# Patient Record
Sex: FEMALE | Race: WHITE | NOT HISPANIC OR LATINO | Employment: FULL TIME | URBAN - METROPOLITAN AREA
[De-identification: names, ages, dates, MRNs, and addresses within clinical notes are randomized per-mention and may not be internally consistent; named-entity substitution may affect disease eponyms.]

---

## 2017-03-15 ENCOUNTER — OFFICE VISIT - HEALTHEAST (OUTPATIENT)
Dept: FAMILY MEDICINE | Facility: CLINIC | Age: 32
End: 2017-03-15

## 2017-03-15 DIAGNOSIS — N39.0 UTI (URINARY TRACT INFECTION): ICD-10-CM

## 2017-10-30 ENCOUNTER — AMBULATORY - HEALTHEAST (OUTPATIENT)
Dept: NURSING | Facility: CLINIC | Age: 32
End: 2017-10-30

## 2017-10-30 DIAGNOSIS — Z23 NEEDS FLU SHOT: ICD-10-CM

## 2018-02-22 ENCOUNTER — OFFICE VISIT - HEALTHEAST (OUTPATIENT)
Dept: FAMILY MEDICINE | Facility: CLINIC | Age: 33
End: 2018-02-22

## 2018-02-22 DIAGNOSIS — H66.91 RIGHT OTITIS MEDIA: ICD-10-CM

## 2018-04-06 ENCOUNTER — RECORDS - HEALTHEAST (OUTPATIENT)
Dept: ADMINISTRATIVE | Facility: OTHER | Age: 33
End: 2018-04-06

## 2018-04-10 ENCOUNTER — RECORDS - HEALTHEAST (OUTPATIENT)
Dept: ADMINISTRATIVE | Facility: OTHER | Age: 33
End: 2018-04-10

## 2018-04-11 ENCOUNTER — COMMUNICATION - HEALTHEAST (OUTPATIENT)
Dept: ADMINISTRATIVE | Facility: CLINIC | Age: 33
End: 2018-04-11

## 2018-04-18 ENCOUNTER — OFFICE VISIT - HEALTHEAST (OUTPATIENT)
Dept: EDUCATION SERVICES | Facility: CLINIC | Age: 33
End: 2018-04-18

## 2018-04-18 DIAGNOSIS — O24.410 DIET CONTROLLED GESTATIONAL DIABETES MELLITUS (GDM) IN SECOND TRIMESTER: ICD-10-CM

## 2018-04-24 ENCOUNTER — COMMUNICATION - HEALTHEAST (OUTPATIENT)
Dept: ADMINISTRATIVE | Facility: CLINIC | Age: 33
End: 2018-04-24

## 2018-04-25 ENCOUNTER — AMBULATORY - HEALTHEAST (OUTPATIENT)
Dept: EDUCATION SERVICES | Facility: CLINIC | Age: 33
End: 2018-04-25

## 2018-04-25 DIAGNOSIS — O24.414 INSULIN CONTROLLED GESTATIONAL DIABETES MELLITUS (GDM) IN SECOND TRIMESTER: ICD-10-CM

## 2018-04-26 ENCOUNTER — COMMUNICATION - HEALTHEAST (OUTPATIENT)
Dept: ENDOCRINOLOGY | Facility: CLINIC | Age: 33
End: 2018-04-26

## 2018-04-26 DIAGNOSIS — O24.414 INSULIN CONTROLLED GESTATIONAL DIABETES MELLITUS (GDM) IN THIRD TRIMESTER: ICD-10-CM

## 2018-05-03 ENCOUNTER — OFFICE VISIT - HEALTHEAST (OUTPATIENT)
Dept: EDUCATION SERVICES | Facility: CLINIC | Age: 33
End: 2018-05-03

## 2018-05-03 DIAGNOSIS — O24.414 INSULIN CONTROLLED GESTATIONAL DIABETES MELLITUS (GDM) IN SECOND TRIMESTER: ICD-10-CM

## 2018-05-03 ASSESSMENT — MIFFLIN-ST. JEOR: SCORE: 1905.6

## 2018-05-31 ENCOUNTER — OFFICE VISIT - HEALTHEAST (OUTPATIENT)
Dept: EDUCATION SERVICES | Facility: CLINIC | Age: 33
End: 2018-05-31

## 2018-05-31 ENCOUNTER — OFFICE VISIT - HEALTHEAST (OUTPATIENT)
Dept: ENDOCRINOLOGY | Facility: CLINIC | Age: 33
End: 2018-05-31

## 2018-05-31 DIAGNOSIS — O24.414 INSULIN CONTROLLED GESTATIONAL DIABETES MELLITUS (GDM) IN THIRD TRIMESTER: ICD-10-CM

## 2018-05-31 DIAGNOSIS — O24.414 INSULIN CONTROLLED GESTATIONAL DIABETES MELLITUS (GDM) IN SECOND TRIMESTER: ICD-10-CM

## 2018-05-31 ASSESSMENT — MIFFLIN-ST. JEOR: SCORE: 1909.23

## 2018-06-06 ENCOUNTER — COMMUNICATION - HEALTHEAST (OUTPATIENT)
Dept: ADMINISTRATIVE | Facility: CLINIC | Age: 33
End: 2018-06-06

## 2018-06-06 ENCOUNTER — COMMUNICATION - HEALTHEAST (OUTPATIENT)
Dept: EDUCATION SERVICES | Facility: CLINIC | Age: 33
End: 2018-06-06

## 2018-06-27 ENCOUNTER — OFFICE VISIT - HEALTHEAST (OUTPATIENT)
Dept: EDUCATION SERVICES | Facility: CLINIC | Age: 33
End: 2018-06-27

## 2018-06-27 ENCOUNTER — OFFICE VISIT - HEALTHEAST (OUTPATIENT)
Dept: ENDOCRINOLOGY | Facility: CLINIC | Age: 33
End: 2018-06-27

## 2018-06-27 DIAGNOSIS — O24.414 INSULIN CONTROLLED GESTATIONAL DIABETES MELLITUS (GDM) IN SECOND TRIMESTER: ICD-10-CM

## 2018-06-27 ASSESSMENT — MIFFLIN-ST. JEOR: SCORE: 1906.51

## 2018-07-18 ENCOUNTER — OFFICE VISIT - HEALTHEAST (OUTPATIENT)
Dept: EDUCATION SERVICES | Facility: CLINIC | Age: 33
End: 2018-07-18

## 2018-07-18 ENCOUNTER — OFFICE VISIT - HEALTHEAST (OUTPATIENT)
Dept: ENDOCRINOLOGY | Facility: CLINIC | Age: 33
End: 2018-07-18

## 2018-07-18 DIAGNOSIS — O24.414 INSULIN CONTROLLED GESTATIONAL DIABETES MELLITUS (GDM) IN THIRD TRIMESTER: ICD-10-CM

## 2018-07-18 DIAGNOSIS — O24.414 INSULIN CONTROLLED GESTATIONAL DIABETES MELLITUS (GDM) IN SECOND TRIMESTER: ICD-10-CM

## 2018-07-18 ASSESSMENT — MIFFLIN-ST. JEOR: SCORE: 1908.33

## 2018-08-01 ENCOUNTER — OFFICE VISIT - HEALTHEAST (OUTPATIENT)
Dept: ENDOCRINOLOGY | Facility: CLINIC | Age: 33
End: 2018-08-01

## 2018-08-01 ENCOUNTER — OFFICE VISIT - HEALTHEAST (OUTPATIENT)
Dept: EDUCATION SERVICES | Facility: CLINIC | Age: 33
End: 2018-08-01

## 2018-08-01 DIAGNOSIS — O24.414 INSULIN CONTROLLED GESTATIONAL DIABETES MELLITUS (GDM) IN THIRD TRIMESTER: ICD-10-CM

## 2018-08-01 DIAGNOSIS — O24.414 INSULIN CONTROLLED GESTATIONAL DIABETES MELLITUS (GDM) IN SECOND TRIMESTER: ICD-10-CM

## 2018-08-01 ASSESSMENT — MIFFLIN-ST. JEOR: SCORE: 1927.38

## 2018-08-07 ENCOUNTER — RECORDS - HEALTHEAST (OUTPATIENT)
Dept: ADMINISTRATIVE | Facility: OTHER | Age: 33
End: 2018-08-07

## 2018-08-10 ENCOUNTER — HOSPITAL ENCOUNTER (OUTPATIENT)
Dept: ULTRASOUND IMAGING | Facility: HOSPITAL | Age: 33
Discharge: HOME | End: 2018-08-10
Attending: STUDENT IN AN ORGANIZED HEALTH CARE EDUCATION/TRAINING PROGRAM

## 2018-08-10 DIAGNOSIS — O24.419 GESTATIONAL DIABETES: ICD-10-CM

## 2018-08-15 ENCOUNTER — OFFICE VISIT - HEALTHEAST (OUTPATIENT)
Dept: EDUCATION SERVICES | Facility: CLINIC | Age: 33
End: 2018-08-15

## 2018-08-15 DIAGNOSIS — O24.414 INSULIN CONTROLLED GESTATIONAL DIABETES MELLITUS (GDM) IN THIRD TRIMESTER: ICD-10-CM

## 2018-09-03 ENCOUNTER — RECORDS - HEALTHEAST (OUTPATIENT)
Dept: ADMINISTRATIVE | Facility: OTHER | Age: 33
End: 2018-09-03

## 2018-09-07 ENCOUNTER — COMMUNICATION - HEALTHEAST (OUTPATIENT)
Dept: ADMINISTRATIVE | Facility: CLINIC | Age: 33
End: 2018-09-07

## 2018-09-10 ENCOUNTER — SURGERY - HEALTHEAST (OUTPATIENT)
Dept: OBGYN | Facility: HOSPITAL | Age: 33
End: 2018-09-10

## 2018-09-10 ENCOUNTER — ANESTHESIA - HEALTHEAST (OUTPATIENT)
Dept: OBGYN | Facility: HOSPITAL | Age: 33
End: 2018-09-10

## 2018-09-10 ASSESSMENT — MIFFLIN-ST. JEOR: SCORE: 1937.81

## 2018-09-13 ENCOUNTER — HOME CARE/HOSPICE - HEALTHEAST (OUTPATIENT)
Dept: HOME HEALTH SERVICES | Facility: HOME HEALTH | Age: 33
End: 2018-09-13

## 2018-09-15 ENCOUNTER — HOME CARE/HOSPICE - HEALTHEAST (OUTPATIENT)
Dept: HOME HEALTH SERVICES | Facility: HOME HEALTH | Age: 33
End: 2018-09-15

## 2018-11-01 ENCOUNTER — AMBULATORY - HEALTHEAST (OUTPATIENT)
Dept: NURSING | Facility: CLINIC | Age: 33
End: 2018-11-01

## 2019-02-19 ENCOUNTER — OFFICE VISIT - HEALTHEAST (OUTPATIENT)
Dept: FAMILY MEDICINE | Facility: CLINIC | Age: 34
End: 2019-02-19

## 2019-02-19 DIAGNOSIS — H66.91 ACUTE RIGHT OTITIS MEDIA: ICD-10-CM

## 2019-10-25 ENCOUNTER — AMBULATORY - HEALTHEAST (OUTPATIENT)
Dept: NURSING | Facility: CLINIC | Age: 34
End: 2019-10-25

## 2019-10-25 DIAGNOSIS — Z23 NEEDS FLU SHOT: ICD-10-CM

## 2020-04-28 ENCOUNTER — COMMUNICATION - HEALTHEAST (OUTPATIENT)
Dept: HEALTH INFORMATION MANAGEMENT | Facility: CLINIC | Age: 35
End: 2020-04-28

## 2021-05-30 VITALS — WEIGHT: 268.1 LBS

## 2021-06-01 VITALS — WEIGHT: 275.2 LBS

## 2021-06-01 VITALS — HEIGHT: 63 IN | BODY MASS INDEX: 48.99 KG/M2 | WEIGHT: 276.5 LBS

## 2021-06-01 VITALS — WEIGHT: 276.7 LBS | HEIGHT: 63 IN | BODY MASS INDEX: 49.03 KG/M2

## 2021-06-01 VITALS — WEIGHT: 280.7 LBS | HEIGHT: 63 IN | BODY MASS INDEX: 49.73 KG/M2

## 2021-06-01 VITALS — WEIGHT: 277.8 LBS

## 2021-06-01 VITALS — HEIGHT: 63 IN | BODY MASS INDEX: 48.89 KG/M2 | WEIGHT: 275.9 LBS

## 2021-06-01 VITALS — BODY MASS INDEX: 48.92 KG/M2 | HEIGHT: 63 IN | WEIGHT: 276.1 LBS

## 2021-06-01 VITALS — WEIGHT: 281 LBS

## 2021-06-02 VITALS — BODY MASS INDEX: 50.14 KG/M2 | WEIGHT: 283 LBS | HEIGHT: 63 IN

## 2021-06-02 VITALS — BODY MASS INDEX: 49.33 KG/M2 | WEIGHT: 278.5 LBS

## 2021-06-05 ENCOUNTER — RECORDS - HEALTHEAST (OUTPATIENT)
Dept: SCHEDULING | Facility: CLINIC | Age: 36
End: 2021-06-05

## 2021-06-05 DIAGNOSIS — O99.810 ABNORMAL GLUCOSE TOLERANCE OF MOTHER AFFECTING PREGNANCY, CHILDBIRTH, OR PUERPERIUM: ICD-10-CM

## 2021-06-09 NOTE — PROGRESS NOTES
SUBJECTIVE:  Yokasta Gary is a 31 y.o. female  who complains of urinary frequency, urgency and dysuria x 2 days, without flank pain, fever, chills, or abnormal vaginal discharge or bleeding.     OBJECTIVE: Appears in mild distress.  Fever is absent. The abdomen is soft without suprapubic tenderness. No CVA tenderness noted.     Results for orders placed or performed in visit on 03/15/17   Urinalysis-UC if Indicated   Result Value Ref Range    Color, UA Yellow Colorless, Yellow, Straw, Light Yellow    Clarity, UA Cloudy (!) Clear    Glucose, UA Negative Negative    Bilirubin, UA Negative Negative    Ketones, UA Negative Negative    Specific Gravity, UA 1.010 1.005 - 1.030    Blood, UA Large (!) Negative    pH, UA 7.0 5.0 - 8.0    Protein, UA 30 mg/dL (!) Negative mg/dL    Urobilinogen, UA 0.2 E.U./dL 0.2 E.U./dL, 1.0 E.U./dL    Nitrite, UA Negative Negative    Leukocytes, UA Large (!) Negative    Bacteria, UA Moderate (!) None Seen hpf    RBC, UA 10-25 (!) None Seen, 0-2 hpf    WBC, UA 25-50 (!) None Seen, 0-5 hpf    Squam Epithel, UA 5-10 (!) None Seen, 0-5 lpf    WBC Clumps Present (!) None Seen         ASSESSMENT: UTI uncomplicated without evidence of pyelonephritis    PLAN:     Cephalexin    Encourage fluids,     Urine culture was added.      Call or return to clinic prn if these symptoms worsen or fail to improve as anticipated.    Medications Ordered   Medications     cephalexin (KEFLEX) 500 MG capsule     Sig: Take 1 capsule (500 mg total) by mouth 3 (three) times a day for 3 days.     Dispense:  9 capsule     Refill:  0

## 2021-06-16 PROBLEM — O24.414 INSULIN CONTROLLED GESTATIONAL DIABETES MELLITUS (GDM) IN SECOND TRIMESTER: Status: ACTIVE | Noted: 2018-06-27

## 2021-06-16 NOTE — PROGRESS NOTES
Assessment:     1. Right otitis media  amoxicillin-clavulanate (AUGMENTIN) 875-125 mg per tablet          Plan:     Discussed with the patient about the plan of care.  We will treat patient with twice a day ×7 days.  Advised patient to monitor for worsening symptoms.  May take Tylenol as needed for pain.  May follow-up with PCP if symptoms does not resolve after treatment.  Patient verbalized understanding the plan of care.    Subjective:       32 y.o. female presents for evaluation of right ear pain.  She reports that she noticed that her ear felt plugged for about a week and then 2 days ago she started experiencing severe aching stabbing pain.  She has been taking Tylenol and it has not been effective.  She reports that when she was young she had ear infections where she ended up with ear tubes.  She reports that the pain wakes her up at night.  She has a cough on and off for about 2 weeks.  She traveled in the air about 6 weeks ago.  She denies a fever.  Patient is 11 weeks pregnant.     The following portions of the patient's history were reviewed and updated as appropriate: allergies, current medications, past family history, past medical history, past social history, past surgical history and problem list.    Review of Systems  A 12 point comprehensive review of systems was negative except as noted.     Objective:      /68  Pulse 75  Temp 98.5  F (36.9  C) (Oral)   Resp 12  Wt (!) 281 lb (127.5 kg)  SpO2 98%  Breastfeeding? No  BMI 49.78 kg/m2  General appearance: alert, appears stated age, cooperative and moderate distress  Head: Normocephalic, without obvious abnormality, atraumatic, sinuses nontender to percussion  Eyes: conjunctivae/corneas clear. PERRL, EOM's intact. Fundi benign.  Ears: abnormal TM right ear - erythematous, bulging and serous middle ear fluid  Nose: Nares normal. Septum midline. Mucosa normal. No drainage or sinus tenderness.  Throat: lips, mucosa, and tongue normal; teeth  and gums normal  Neck: no adenopathy  Lungs: clear to auscultation bilaterally  Heart: regular rate and rhythm, S1, S2 normal, no murmur, click, rub or gallop  Extremities: extremities normal, atraumatic, no cyanosis or edema  Skin: Skin color, texture, turgor normal. No rashes or lesions  Lymph nodes: Cervical, supraclavicular, and axillary nodes normal.  Neurologic: Grossly normal     This note has been dictated using voice recognition software. Any grammatical or context distortions are unintentional and inherent to the software

## 2021-06-17 NOTE — PROGRESS NOTES
[facility] [place] GDM Care Plan    Assessment: pt seen today for f/u. She brings in BG and food log. All FBG are elevated at 104-116. She has been sick the past 2 days but is feeling ok today. Per protocol started on 10 units NPH at HS. Reviewed signs and symptoms of hypoglycemia and treatment.   Pt was able to demo pen w/o difficulty. Rx sent per Romy.   PP readings are well controlled. Ketones are normal. Pt will f/u in pregnancy clinic 5/3/18. She will call sooner with any other concerns.       Visit Type: GDM Individual Follow-up  Time: 30  Provider: Megan   Provider's Diagnosis (per referral form): Gestational (648.83)  Weight: (!) 275 lb 3.2 oz (124.8 kg)  OGTT: early 1 hour 150   Estimated Date of Delivery: 9/17/18  Pregnancy #: 2  Previous GDM: Yes   Medications:   Current Outpatient Prescriptions:      acetone, urine, test (ACETONE, URINE, TEST) Strp, Use 1 each As Directed every morning., Disp: 50 strip, Rfl: 4     blood glucose test (CONTOUR NEXT TEST STRIPS) strips, Use 1 each As Directed 4 (four) times a day., Disp: 150 strip, Rfl: 5     generic lancets, Use 1 each As Directed 4 (four) times a day., Disp: 100 each, Rfl: 4     prenatal vitamin iron-folic acid 27mg-0.8mg (PRENATAL S) 27 mg iron- 800 mcg Tab tablet, Take 1 tablet by mouth daily., Disp: , Rfl:     BG monitoring goals: Fasting <95; 1 hour post start of meal <140. Test 4 x per day.  Check fasting a.m. ketones: Yes  GDM meal pattern/carb counting taught per guidelines: Yes    Past Goals:  Nutrition: GDM meal plan MET  Activity: Walking after meals when able/staying active MET  Monitoring: BG 4x/day as directed, ketones every morning MET      New Goals:  Nutrition: GDM meal plan   Activity: Walking after meals when able/staying active   Monitoring: BG 4x/day as directed, ketones every morning    DIABETES CARE PLAN AND EDUCATION RECORD    Gestational Diabetes Disease Process/Preconception Care/Management During Pregnancy/Postpartum:Discussed    Meter (per  above goals): Discussed    Nutrition Management    Weight: Assessed and Discussed  Portions/Balance: Assessed and Discussed  Carb ID/Count: Assessed and Discussed  Label Reading: Assessed and Discussed  Menu Planning: Assessed and Discussed  Dining Out: Assessed and Discussed  Physical Activity: Assessed and Discussed    Acute Complications: Prevent, Detect, Treat:    Goal Setting and Problem Solving: Assessed and Discussed  Barriers: Assessed and Discussed  Psychosocial Adjustments: Assessed and Discussed    I agree with the aforementioned diabetes plan.  Cher VASQUEZ [facility]  4/26/2018  10:26 AM

## 2021-06-17 NOTE — PROGRESS NOTES
"[facility] GDM Care Plan      Assessment/Plan: Pt seen today for f/u. She is up to 12 units at HS. Pt forgot her BG readings and meter today. She reports she had 2 FBG >95 so she increased as instructed. She has not had any low BG. Reports pp readings are all WNL and ketones are negative. Pt is feeling well. She will call prior to f/u with any concerns.       Time: 30  Visit Type: pregnancy clinic   Provider: Megan   Provider's Diagnosis (per referral form): Gestational (648.83)   OGTT: early 1 hour 150  Estimated Date of Delivery: 9/17/18  Pregnancy #: 2  Previous GDM: Yes   Medications:   Current Outpatient Prescriptions:      acetone, urine, test (ACETONE, URINE, TEST) Strp, Use 1 each As Directed every morning., Disp: 50 strip, Rfl: 4     blood glucose test (CONTOUR NEXT TEST STRIPS) strips, Use 1 each As Directed 4 (four) times a day., Disp: 150 strip, Rfl: 5     generic lancets, Use 1 each As Directed 4 (four) times a day., Disp: 100 each, Rfl: 4     HUMULIN N NPH INSULIN KWIKPEN 100 unit/mL (3 mL) pen, Inject 10 Units under the skin at bedtime., Disp: 15 mL, Rfl: 1     insulin detemir U-100 (LEVEMIR FLEXTOUCH U-100 INSULN) 100 unit/mL (3 mL) pen, Inject 10 Units under the skin once for 1 dose., Disp: 15 mL, Rfl: 0     pen needle, diabetic (BD ULTRA-FINE RAJ PEN NEEDLE) 32 gauge x 5/32\" Ndle, Use 1 each As Directed once daily., Disp: 100 each, Rfl: 4     prenatal vitamin iron-folic acid 27mg-0.8mg (PRENATAL S) 27 mg iron- 800 mcg Tab tablet, Take 1 tablet by mouth daily., Disp: , Rfl:       BG monitoring goals: Fasting <95; 1 hour post start of meal <140. Test 4 x per day.  Check fasting a.m. ketones: Yes  GDM meal pattern/carb counting taught per guidelines: Yes    Past Goals:  Nutrition: GDM meal plan MET  Activity: Walking after meals when able/staying active MET  Monitoring: BG 4x/day as directed, ketones every morning MET      New Goals:  Nutrition: GDM meal plan   Activity: Walking after meals when " able/staying active   Monitoring: BG 4x/day as directed, ketones every morning    DIABETES CARE PLAN AND EDUCATION RECORD    Gestational Diabetes Disease Process/Preconception Care/Management During Pregnancy/Postpartum:Discussed    Meter (per above goals): Discussed    Nutrition Management    Weight: Assessed and Discussed  Portions/Balance: Assessed and Discussed  Carb ID/Count: Assessed and Discussed  Label Reading: Assessed and Discussed  Menu Planning: Assessed and Discussed  Dining Out: Assessed and Discussed  Physical Activity: Assessed and Discussed    Acute Complications: Prevent, Detect, Treat:    Goal Setting and Problem Solving: Assessed and Discussed  Barriers: Assessed and Discussed  Psychosocial Adjustments: Assessed and Discussed      I agree with the aforementioned diabetes plan.  Cher VASQUEZ [facility]  5/3/2018  9:32 AM

## 2021-06-17 NOTE — PROGRESS NOTES
[facility] GDM Care Plan    Assessment: Pt seen today for initial visit. She had GDM in 2104 and used insulin. BG today was 138 mg/dl about 1 hour after lunch.   Pt will f/u next week as scheduled for review of BG readings. Testing supplies sent to pharmacy, ok per Romy Tyler NP.     Plan: f/u next week.     Provider: Megan   Provider's Diagnosis (per referral form): Gestational (648.83)  Weight: (!) 277 lb 12.8 oz (126 kg)  OGTT: early 1 hour at 150  EDC: 9/17/18  Pregnancy #: 2  Previous GDM: Yes  Medications:   Current Outpatient Prescriptions:      acetone, urine, test (ACETONE, URINE, TEST) Strp, Use 1 each As Directed every morning., Disp: 50 strip, Rfl: 4     blood glucose test (CONTOUR NEXT TEST STRIPS) strips, Use 1 each As Directed 4 (four) times a day., Disp: 150 strip, Rfl: 5     generic lancets, Use 1 each As Directed 4 (four) times a day., Disp: 100 each, Rfl: 4     prenatal vitamin iron-folic acid 27mg-0.8mg (PRENATAL S) 27 mg iron- 800 mcg Tab tablet, Take 1 tablet by mouth daily., Disp: , Rfl:     BG monitoring goals: Fasting <95; 1 hour post start of meal <140. Test 4 x per day.  Check fasting a.m. ketones: Yes  GDM meal pattern/carb counting taught per guidelines: Yes  Goals: Nutrition: GDM meal plan  Activity:  Walking after meals when able/staying active  Monitoring:  BG 4x/day as directed, ketones every morning    F/u in 1 week to assess BG and food log     DIABETES CARE PLAN AND EDUCATION RECORD    Gestational Diabetes Disease Process/Preconception Care/Management During Pregnancy/Postpartum:    Meter (per above goals): Discussed, Literature provided and Patient returned demonstration    Nutrition Management    Weight: Assessed, Discussed and Literature provided  Portions/Balance: Assessed, Discussed and Literature provided  Carb ID/Count: Assessed, Discussed and Literature provided  Label Reading: Assessed, Discussed and Literature provided  Menu Planning: Assessed, Discussed and Literature  provided  Dining Out: Assessed, Discussed and Literature provided  Physical Activity: Discussed and Literature provided    Acute Complications: Prevent, Detect, Treat:    Goal Setting and Problem Solving: Assessed and Discussed  Barriers: Assessed and Discussed  Psychosocial Adjustments: Assessed and Discussed      Time: 30  Visit Type: GDM Individual   Visit #: 1    I agree with the aforementioned diabetes plan.  Cher VASQUEZ [facility]  4/19/2018  10:07 AM

## 2021-06-18 NOTE — PROGRESS NOTES
[facility]  ENDOCRINOLOGY    Gestational Diabetes 2018    Yokasta Gary, 1985, [id number]          Reason for visit      1. Insulin controlled gestational diabetes mellitus (GDM) in third trimester        HPI     Yokasta Gary is a very pleasant 33 y.o. old female who presents for GESTATIONAL Diabetes Mellitus.  She is currently 24w3d  pregnant . Due date is 18  Diagnosed with GDM based on an OGTT. She hashad  GDM in prior pregnancies.   Current carbohydrate intake:consistent with recommendations of 30g-60g-60g.  I have reviewed her blood glucose logs and note that the:  Fasting readings  are:above range on current regimen  Postprandial readings are:in range on current regimen  Current Levemir dose: 20  Current Prandial insulin: 0  Blood glucose logs/meter brought in and data reviewed and incorporated into decision-making.  Planned delivery at: [facility]  OBGYN: Kulwinder    Therapy/Interventions in the past:  She has been seen by the Diabetes Educator- and has received instruction on carbohydrate counting and  consistency.  Records from referring provider and other sources have also been reviewed and incorporated into decision-making.      TODAY:  Yokasta is here today for the first time after starting insulin. She has brought her log book with her.  Her PP readings are good, but her FBS are too high. We will increase her insulin this evening.  She mentions that they have been watching her BP at her OBs office, although her BP is always good when she is here.  Baby is moving appropriately and she is having some swelling.     Past Medical History       Patient Active Problem List   Diagnosis     Pregnancy        Past Surgical History     Past Surgical History:   Procedure Laterality Date      SECTION N/A 10/17/2014    Procedure:  SECTION;  Surgeon: Maureen Chanel MD;  Location: [facility] L[facility] OR;  Service:        Family History     History reviewed. No pertinent  "family history.    Social History     Social History   Substance Use Topics     Smoking status: Former Smoker     Smokeless tobacco: Never Used     Alcohol use No       Review of Systems     Patient has no polyuria or polydipsia, no chest pain, dyspnea or TIA's, no numbness, tingling or pain in extremities  Remainder negative except as noted in HPI.      Vital Signs     /72 (Patient Site: Right Arm, Patient Position: Sitting, Cuff Size: Adult Regular)  Pulse 84  Ht 5' 3\" (1.6 m)  Wt (!) 276 lb 11.2 oz (125.5 kg)  BMI 49.02 kg/m2  Wt Readings from Last 3 Encounters:   05/31/18 (!) 276 lb 11.2 oz (125.5 kg)   05/03/18 (!) 275 lb 14.4 oz (125.1 kg)   04/25/18 (!) 275 lb 3.2 oz (124.8 kg)       Physical Exam     GENERAL: Pleasant, alert, appropriate appearance for age. No acute distress,   HEENT: Normocephalic, atraumatic  NECK: Supple, no masses or  lymph nodes.  THYROID: No nodules or enlargement. Non-tender, no bruit  CHEST/RESPIRATORY: Normal chest wall and respirations. Clear to auscultation.  CARDIOVASCULAR: Regular rate and rhythm. S1, S2, no murmur, click, gallop, or rubs.  ABDOMEN: Gravid   LYMPHATIC: No palpable nodes in neck, supraclavicular,  SKIN: No melanosis,  ecchymosis,  vitiligo. No acanthosis nigricans  NEURO:  Non-focal, normal DTRs; no tremor.  PSYCH: Alert and oriented -appropriate affect. Orientation, judgement and memory appear intact.  MSK: No joint abnormalities, FROM in all four extremities. No kyphosis    Assessment     1. Insulin controlled gestational diabetes mellitus (GDM) in third trimester        Plan     1. GESTATIONAL DIABETES-  Adjust dose as follows:    -Levemir insulin 24   units. Increase by 2 units every 2 days to keep fasting blood glucose below 95mg/dL  -Novolog 0  units with breakfast  -Novolog 0 units with lunch   -Novolog 0 units with dinner  -Increase by 2 units every 2 days to keep 1 hour after meal blood glucose less than 140mg/dL    We reviewed glucose goals of " "fasting blood glucose <95 mg/dL and 1 hour post prandial blood glucose of <140 mg/dL.    Monitor blood sugar 4 times daily: Fasting  and 1 hour after each meal.  Contact  this clinic [phone] if blood glucose is not within the above-mentioned goals.     We discussed the importance of excellent glycemic control during pregnancy to limit complications such as fetal macrosomia, shoulder dystocia,  hypoglycemia and hyperbilirubinemia.  I have discussed the patient's increased risk of recurrent GDM and/or development of type 2 diabetes later in life.        Yokasta will return in 2 weeks.  Time spent with pt today: 25 min with >50% spent in counseling and coordination of care.        Cher Tyler  2018      Lab Results     No results found for: HGBA1C, CREATININE, MICROALBUR    No results found for: CHOL, HDL, LDLCALC, TRIG    No results found for: ALT, AST, GGT, ALKPHOS, BILITOT      Current Medications     Outpatient Medications Prior to Visit   Medication Sig Dispense Refill     acetone, urine, test (ACETONE, URINE, TEST) Strp Use 1 each As Directed every morning. 50 strip 4     blood glucose test (CONTOUR NEXT TEST STRIPS) strips Use 1 each As Directed 4 (four) times a day. 150 strip 5     generic lancets Use 1 each As Directed 4 (four) times a day. 100 each 4     pen needle, diabetic (BD ULTRA-FINE RAJ PEN NEEDLE) 32 gauge x 5/32\" Ndle Use 1 each As Directed once daily. 100 each 4     prenatal vitamin iron-folic acid 27mg-0.8mg (PRENATAL S) 27 mg iron- 800 mcg Tab tablet Take 1 tablet by mouth daily.       insulin detemir U-100 (LEVEMIR FLEXTOUCH U-100 INSULN) 100 unit/mL (3 mL) pen Inject 10 Units under the skin once for 1 dose. (Patient taking differently: Inject 22 Units under the skin once. ) 15 mL 0     No facility-administered medications prior to visit.        "

## 2021-06-18 NOTE — PROGRESS NOTES
"[facility] GDM Care Plan      Assessment/Plan: Pt seen today for f/u. She brings in BG log book. Pp readings are all well controlled, just 1 small elevation/week after dinner. She has been increasing her HS insulin appropriately. Up to 20 units at HS. FBG are still in the  range. Will increase to 24 units tonight per Romy.   No other concerns, pt is doing well. Will call with any concerns prior to f/u.         Time: 30  Visit Type: pregnancy clinic   Provider: Megan   Provider's Diagnosis (per referral form): Gestational (648.83)  OGTT:  Early 1 hour 150  Estimated Date of Delivery: 9/17/18  Pregnancy #: 2  Previous GDM: Yes   Medications:   Current Outpatient Prescriptions:      acetone, urine, test (ACETONE, URINE, TEST) Strp, Use 1 each As Directed every morning., Disp: 50 strip, Rfl: 4     blood glucose test (CONTOUR NEXT TEST STRIPS) strips, Use 1 each As Directed 4 (four) times a day., Disp: 150 strip, Rfl: 5     generic lancets, Use 1 each As Directed 4 (four) times a day., Disp: 100 each, Rfl: 4     insulin detemir U-100 (LEVEMIR FLEXTOUCH U-100 INSULN) 100 unit/mL (3 mL) pen, Inject 10 Units under the skin once for 1 dose. (Patient taking differently: Inject 22 Units under the skin once. ), Disp: 15 mL, Rfl: 0     pen needle, diabetic (BD ULTRA-FINE RAJ PEN NEEDLE) 32 gauge x 5/32\" Ndle, Use 1 each As Directed once daily., Disp: 100 each, Rfl: 4     prenatal vitamin iron-folic acid 27mg-0.8mg (PRENATAL S) 27 mg iron- 800 mcg Tab tablet, Take 1 tablet by mouth daily., Disp: , Rfl:       BG monitoring goals: Fasting <95; 1 hour post start of meal <140. Test 4 x per day.  Check fasting a.m. ketones: Yes  GDM meal pattern/carb counting taught per guidelines: Yes    Past Goals:  Nutrition: GDM meal plan MET  Activity: Walking after meals when able/staying active MET  Monitoring: BG 4x/day as directed, ketones every morning MET      New Goals:  Nutrition: GDM meal plan   Activity: Walking after meals when " able/staying active   Monitoring: BG 4x/day as directed, ketones every morning    DIABETES CARE PLAN AND EDUCATION RECORD    Gestational Diabetes Disease Process/Preconception Care/Management During Pregnancy/Postpartum:Discussed    Meter (per above goals): Discussed    Nutrition Management    Weight: Assessed and Discussed  Portions/Balance: Assessed and Discussed  Carb ID/Count: Assessed and Discussed  Label Reading: Assessed and Discussed  Menu Planning: Assessed and Discussed  Dining Out: Assessed and Discussed  Physical Activity: Assessed and Discussed    Acute Complications: Prevent, Detect, Treat:    Goal Setting and Problem Solving: Assessed and Discussed  Barriers: Assessed and Discussed  Psychosocial Adjustments: Assessed and Discussed    I agree with the aforementioned diabetes plan.  Cher VASQUEZ [facility]  5/31/2018  9:01 AM

## 2021-06-18 NOTE — PROGRESS NOTES
"[facility] GDM Care Plan      Assessment/Plan: pt seen today for f/u. She has been increasing her insulin appropriately and is up to 42 units at HS. The last couple of days have been slightly elevated, will increase to 46 units tonight and continue to titrate for every 2 readings >95 (FBG). She has not had any low BG. After breakfast and lunch are all WNL. After dinner is starting to elevate just at 140 or slightly over, about 3-4x/week. Will start 2 units of novolog with dinner per Romy.   She is following GDM meal plan and ketones are normal.   Pt will call with any questions/concerns prior to f/u.         Time: 30  Visit Type: pregnancy clinic   Provider: Megan   Provider's Diagnosis (per referral form): Gestational (648.83)  OGTT:   Results for orders placed or performed in visit on 07/29/14   GTT Gestational 2 Hour   Result Value Ref Range    Glucose, 2 Hour 236 (H) 70 - 154 mg/dL   GTT Gestational 1 Hour   Result Value Ref Range    Glucose, 1 Hour 257 (H) 70 - 179 mg/dL   GTT Gestational Fasting   Result Value Ref Range    Glucose, Fasting 122 (H) 70 - 94 mg/dL   GTT Gestational 3 Hour   Result Value Ref Range    Glucose, 3 hour 158 (H) 70 - 139 mg/dL      Estimated Date of Delivery: 9/17/18  Pregnancy #: 2  Previous GDM: Yes   Medications:   Current Outpatient Prescriptions:      acetone, urine, test (ACETONE, URINE, TEST) Strp, Use 1 each As Directed every morning., Disp: 50 strip, Rfl: 4     blood glucose test (CONTOUR NEXT TEST STRIPS) strips, Use 1 each As Directed 4 (four) times a day., Disp: 150 strip, Rfl: 5     generic lancets, Use 1 each As Directed 4 (four) times a day., Disp: 100 each, Rfl: 4     insulin detemir U-100 (LEVEMIR FLEXTOUCH U-100 INSULN) 100 unit/mL (3 mL) pen, Take daily subcutaneously, up to 60 units daily, Disp: 15 mL, Rfl: 5     pen needle, diabetic (BD ULTRA-FINE RAJ PEN NEEDLE) 32 gauge x 5/32\" Ndle, Use 1 each As Directed once daily., Disp: 100 each, Rfl: 4     prenatal vitamin " iron-folic acid 27mg-0.8mg (PRENATAL S) 27 mg iron- 800 mcg Tab tablet, Take 1 tablet by mouth daily., Disp: , Rfl:       BG monitoring goals: Fasting <95; 1 hour post start of meal <140. Test 4 x per day.  Check fasting a.m. ketones: Yes  GDM meal pattern/carb counting taught per guidelines: Yes    Past Goals:  Nutrition: GDM meal plan MET  Activity: Walking after meals when able/staying active MET  Monitoring: BG 4x/day as directed, ketones every morning MET      New Goals:  Nutrition: GDM meal plan   Activity: Walking after meals when able/staying active   Monitoring: BG 4x/day as directed, ketones every morning    DIABETES CARE PLAN AND EDUCATION RECORD    Gestational Diabetes Disease Process/Preconception Care/Management During Pregnancy/Postpartum:Discussed    Meter (per above goals): Discussed    Nutrition Management    Weight: Assessed and Discussed  Portions/Balance: Assessed and Discussed  Carb ID/Count: Assessed and Discussed  Label Reading: Assessed and Discussed  Menu Planning: Assessed and Discussed  Dining Out: Assessed and Discussed  Physical Activity: Assessed and Discussed    Acute Complications: Prevent, Detect, Treat:    Goal Setting and Problem Solving: Assessed and Discussed  Barriers: Assessed and Discussed  Psychosocial Adjustments: Assessed and Discussed      I agree with the aforementioned diabetes plan.  Cher VASQUEZ [facility] Endocrinology  6/27/2018  8:53 AM

## 2021-06-19 NOTE — PROGRESS NOTES
[facility]  ENDOCRINOLOGY    Gestational Diabetes 2018    Yokasta Gary, 1985, [id number]          Reason for visit      1. Insulin controlled gestational diabetes mellitus (GDM) in second trimester        HPI     Yokasta Gary is a very pleasant 33 y.o. old female who presents for GESTATIONAL Diabetes Mellitus.  She is currently 31w2d  pregnant . Due date is 18  Diagnosed with GDM based on an OGTT. She hashad  GDM in prior pregnancies.   Current carbohydrate intake:consistent with recommendations of 30g-60g-60g.  I have reviewed her blood glucose logs and note that the:  Fasting readings  are:above range on current regimen  Postprandial readings are:in range on current regimen  Current Levemir dose: 58  Current Prandial insulin: 0/0/2  Blood glucose logs/meter brought in and data reviewed and incorporated into decision-making.  Planned delivery at: [facility]  OBGYN: Kulwinder  Therapy/Interventions in the past:  She has been seen by the Diabetes Educator- and has received instruction on carbohydrate counting and  consistency.  Records from referring provider and other sources have also been reviewed and incorporated into decision-making.      TODAY:  Yokasta returns today in f/u for GDM.  She is feeling well. She has been titrating appropriately, but continues to have elevations for both FBS and her post dinner readings.  We will increase both today.  She will start the NSTs and BPPs next week.   has no current concerns.  Baby is moving appropriately and she is having no swelling.      Past Medical History       Patient Active Problem List   Diagnosis     Pregnancy     Insulin controlled gestational diabetes mellitus (GDM) in second trimester        Past Surgical History     Past Surgical History:   Procedure Laterality Date      SECTION N/A 10/17/2014    Procedure:  SECTION;  Surgeon: Maureen Chanel MD;  Location: [facility] L+D OR;  Service:        Family  "History     History reviewed. No pertinent family history.    Social History     Social History   Substance Use Topics     Smoking status: Former Smoker     Smokeless tobacco: Never Used     Alcohol use No       Review of Systems     Patient has no polyuria or polydipsia, no chest pain, dyspnea or TIA's, no numbness, tingling or pain in extremities  Remainder negative except as noted in HPI.      Vital Signs     /68 (Patient Site: Right Arm, Patient Position: Sitting, Cuff Size: Adult Regular)  Pulse 84  Ht 5' 3\" (1.6 m)  Wt (!) 276 lb 8 oz (125.4 kg)  BMI 48.98 kg/m2  Wt Readings from Last 3 Encounters:   07/18/18 (!) 276 lb 8 oz (125.4 kg)   06/27/18 (!) 276 lb 1.6 oz (125.2 kg)   05/31/18 (!) 276 lb 11.2 oz (125.5 kg)       Physical Exam     GENERAL: Pleasant, alert, appropriate appearance for age. No acute distress,   HEENT: Normocephalic, atraumatic  NECK: Supple, no masses or  lymph nodes.  THYROID: No nodules or enlargement. Non-tender, no bruit  CHEST/RESPIRATORY: Normal chest wall and respirations. Clear to auscultation.  CARDIOVASCULAR: Regular rate and rhythm. S1, S2, no murmur, click, gallop, or rubs.  ABDOMEN: Gravid   LYMPHATIC: No palpable nodes in neck, supraclavicular,  SKIN: No melanosis,  ecchymosis,  vitiligo. No acanthosis nigricans  NEURO:  Non-focal, normal DTRs; no tremor.  PSYCH: Alert and oriented -appropriate affect. Orientation, judgement and memory appear intact.  MSK: No joint abnormalities, FROM in all four extremities. No kyphosis    Assessment     1. Insulin controlled gestational diabetes mellitus (GDM) in second trimester        Plan     1. GESTATIONAL DIABETES-  Adjust dose as follows:    -Levemir [id number]   units. Increase by 2 units every 2 days to keep fasting blood glucose below 95mg/dL  -Novolog 0  units with breakfast  -Novolog 0 units with lunch   -Novolog 4 units with dinner  -Increase by 2 units every 2 days to keep 1 hour after meal blood glucose less than " "140mg/dL    We reviewed glucose goals of fasting blood glucose <95 mg/dL and 1 hour post prandial blood glucose of <140 mg/dL.    Monitor blood sugar 4 times daily: Fasting  and 1 hour after each meal.  Contact  this clinic [phone] if blood glucose is not within the above-mentioned goals.     We discussed the importance of excellent glycemic control during pregnancy to limit complications such as fetal macrosomia, shoulder dystocia,  hypoglycemia and hyperbilirubinemia.  I have discussed the patient's increased risk of recurrent GDM and/or development of type 2 diabetes later in life.        Pt will f/u in 2 weeks. Time spent with pt today: 25 min with >50% spent in counseling and coordination of care.        Cher Tyler  2018          Lab Results     No results found for: HGBA1C, CREATININE, MICROALBUR    No results found for: CHOL, HDL, LDLCALC, TRIG    No results found for: ALT, AST, GGT, ALKPHOS, BILITOT      Current Medications     Outpatient Medications Prior to Visit   Medication Sig Dispense Refill     acetone, urine, test (ACETONE, URINE, TEST) Strp Use 1 each As Directed every morning. 50 strip 4     blood glucose test (CONTOUR NEXT TEST STRIPS) strips Use 1 each As Directed 4 (four) times a day. 150 strip 5     generic lancets Use 1 each As Directed 4 (four) times a day. 100 each 4     insulin aspart U-100 (NOVOLOG FLEXPEN U-100 INSULIN) 100 unit/mL injection pen Take 2 units with meal to keep 2 hour post prandial reading at or below 140. 15 mL 0     insulin detemir U-100 (LEVEMIR FLEXTOUCH U-100 INSULN) 100 unit/mL (3 mL) pen Take daily subcutaneously, up to 60 units daily 15 mL 5     pen needle, diabetic (BD ULTRA-FINE RAJ PEN NEEDLE) 32 gauge x 5/32\" Ndle Use 1 each As Directed once daily. 100 each 4     prenatal vitamin iron-folic acid 27mg-0.8mg (PRENATAL S) 27 mg iron- 800 mcg Tab tablet Take 1 tablet by mouth daily.       No facility-administered medications prior to visit.  "

## 2021-06-19 NOTE — PROGRESS NOTES
"[facility] GDM Care Plan      Assessment/Plan: pt seen today for f/u. She is doing very well. titrating her insulin appropriately. She is up to 58 units at HS. Will increase to 64 units at HS as the past 2 mornings have been >95.   She is taking 0/0/2 with meals. Breakfast and lunch are all WNL. Dinner is mostly 130's to 140's, 2 per week >140. Will increase to 4 units with dinner.   Ketones are normal, pt feels well. She will call with any concerns prior to f/u.       Time: 30  Visit Type: pregnancy clinic   Provider: Megan   Provider's Diagnosis (per referral form): Gestational (648.83)  /Estimated Date of Delivery: 9/  Pregnancy #: 2  Previous GDM: Yes   Medications:   Current Outpatient Prescriptions:      acetone, urine, test (ACETONE, URINE, TEST) Strp, Use 1 each As Directed every morning., Disp: 50 strip, Rfl: 4     blood glucose test (CONTOUR NEXT TEST STRIPS) strips, Use 1 each As Directed 4 (four) times a day., Disp: 150 strip, Rfl: 5     generic lancets, Use 1 each As Directed 4 (four) times a day., Disp: 100 each, Rfl: 4     insulin aspart U-100 (NOVOLOG FLEXPEN U-100 INSULIN) 100 unit/mL injection pen, Take 2 units with meal to keep 2 hour post prandial reading at or below 140., Disp: 15 mL, Rfl: 0     insulin detemir U-100 (LEVEMIR FLEXTOUCH U-100 INSULN) 100 unit/mL (3 mL) pen, Take daily subcutaneously, up to 60 units daily, Disp: 15 mL, Rfl: 5     pen needle, diabetic (BD ULTRA-FINE RAJ PEN NEEDLE) 32 gauge x 5/32\" Ndle, Use 1 each As Directed once daily., Disp: 100 each, Rfl: 4     prenatal vitamin iron-folic acid 27mg-0.8mg (PRENATAL S) 27 mg iron- 800 mcg Tab tablet, Take 1 tablet by mouth daily., Disp: , Rfl:       BG monitoring goals: Fasting <95; 1 hour post start of meal <140. Test 4 x per day.  Check fasting a.m. ketones: Yes  GDM meal pattern/carb counting taught per guidelines: Yes    Past Goals:  Nutrition: GDM meal plan MET  Activity: Walking after meals when able/staying active " MET  Monitoring: BG 4x/day as directed, ketones every morning MET      New Goals:  Nutrition: GDM meal plan   Activity: Walking after meals when able/staying active   Monitoring: BG 4x/day as directed, ketones every morning    DIABETES CARE PLAN AND EDUCATION RECORD    Gestational Diabetes Disease Process/Preconception Care/Management During Pregnancy/Postpartum:Discussed    Meter (per above goals): Discussed    Nutrition Management    Weight: Assessed and Discussed  Portions/Balance: Assessed and Discussed  Carb ID/Count: Assessed and Discussed  Label Reading: Assessed and Discussed  Menu Planning: Assessed and Discussed  Dining Out: Assessed and Discussed  Physical Activity: Assessed and Discussed    Acute Complications: Prevent, Detect, Treat:    Goal Setting and Problem Solving: Assessed and Discussed  Barriers: Assessed and Discussed  Psychosocial Adjustments: Assessed and Discussed    I agree with the aforementioned diabetes plan.  Cher Yinlara  [facility] Endocrinology  7/18/2018  8:27 AM

## 2021-06-19 NOTE — PROGRESS NOTES
[facility]  ENDOCRINOLOGY    Gestational Diabetes 2018    Yokasta Gary, 1985, [id number]          Reason for visit      1. Insulin controlled gestational diabetes mellitus (GDM) in second trimester        HPI     Yokasta Gary is a very pleasant 33 y.o. old female who presents for GESTATIONAL Diabetes Mellitus.  She is currently 33w  pregnant . Due date is 18  Diagnosed with GDM based on an OGTT. She hashad  GDM in prior pregnancies.   Current carbohydrate intake:consistent with recommendations of 30g-60g-60g.  I have reviewed her blood glucose logs and note that the:  Fasting readings  are:above range on current regimen  Postprandial readings are:in range on current regimen  Current Levemir dose: 70  Current Prandial insulin: 0/0/6  Blood glucose logs/meter brought in and data reviewed and incorporated into decision-making.  Planned delivery at: [facility]  OBGYN: Kulwinder  Therapy/Interventions in the past:  She has been seen by the Diabetes Educator- and has received instruction on carbohydrate counting and  consistency.  Records from referring provider and other sources have also been reviewed and incorporated into decision-making.      TODAY:  Yokasta returns today in f/u for GDM.  She has brought her log book with her and her numbers look pretty good.  Baby is passing the NSTs and BPPs.  Baby is measuring about 2 weeks ahead and weighs about 5 lbs.  Baby is moving appropriately and she is having no swelling.   has no current concerns.  She will have a  at about week 39.    Past Medical History       Patient Active Problem List   Diagnosis     Pregnancy     Insulin controlled gestational diabetes mellitus (GDM) in second trimester        Past Surgical History     Past Surgical History:   Procedure Laterality Date      SECTION N/A 10/17/2014    Procedure:  SECTION;  Surgeon: Maureen Chanel MD;  Location: [facility] L+D OR;  Service:        Family  "History     History reviewed. No pertinent family history.    Social History     Social History   Substance Use Topics     Smoking status: Former Smoker     Smokeless tobacco: Never Used     Alcohol use No       Review of Systems     Patient has no polyuria or polydipsia, no chest pain, dyspnea or TIA's, no numbness, tingling or pain in extremities  Remainder negative except as noted in HPI.      Vital Signs     /68  Ht 5' 3\" (1.6 m)  Wt (!) 280 lb 11.2 oz (127.3 kg)  BMI 49.72 kg/m2  Wt Readings from Last 3 Encounters:   08/01/18 (!) 280 lb 11.2 oz (127.3 kg)   07/18/18 (!) 276 lb 8 oz (125.4 kg)   06/27/18 (!) 276 lb 1.6 oz (125.2 kg)       Physical Exam     GENERAL: Pleasant, alert, appropriate appearance for age. No acute distress,   HEENT: Normocephalic, atraumatic  NECK: Supple, no masses or  lymph nodes.  THYROID: No nodules or enlargement. Non-tender, no bruit  CHEST/RESPIRATORY: Normal chest wall and respirations. Clear to auscultation.  CARDIOVASCULAR: Regular rate and rhythm. S1, S2, no murmur, click, gallop, or rubs.  ABDOMEN: Gravid   LYMPHATIC: No palpable nodes in neck, supraclavicular,  SKIN: No melanosis,  ecchymosis,  vitiligo. No acanthosis nigricans  NEURO:  Non-focal, normal DTRs; no tremor.  PSYCH: Alert and oriented -appropriate affect. Orientation, judgement and memory appear intact.  MSK: No joint abnormalities, FROM in all four extremities. No kyphosis    Assessment     1. Insulin controlled gestational diabetes mellitus (GDM) in second trimester        Plan     1. GESTATIONAL DIABETES-  Adjust dose as follows:    -Levemir insulin 70   units. Increase by 2 units every 2 days to keep fasting blood glucose below 95mg/dL  -Novolog 0  units with breakfast  -Novolog 0 units with lunch   -Novolog 6 units with dinner  -Increase by 2 units every 2 days to keep 1 hour after meal blood glucose less than 140mg/dL    We reviewed glucose goals of fasting blood glucose <95 mg/dL and 1 hour post " "prandial blood glucose of <140 mg/dL.    Monitor blood sugar 4 times daily: Fasting  and 1 hour after each meal.  Contact  this clinic [phone] if blood glucose is not within the above-mentioned goals.     We discussed the importance of excellent glycemic control during pregnancy to limit complications such as fetal macrosomia, shoulder dystocia,  hypoglycemia and hyperbilirubinemia.  I have discussed the patient's increased risk of recurrent GDM and/or development of type 2 diabetes later in life.        Bt will f/u in 2 weeks. Time spent with pt today: 25 min with >50% spent in counseling and coordination of care.        Cher Tyler  2018      Lab Results     No results found for: HGBA1C, CREATININE, MICROALBUR    No results found for: CHOL, HDL, LDLCALC, TRIG    No results found for: ALT, AST, GGT, ALKPHOS, BILITOT      Current Medications     Outpatient Medications Prior to Visit   Medication Sig Dispense Refill     acetone, urine, test (ACETONE, URINE, TEST) Strp Use 1 each As Directed every morning. 50 strip 4     blood glucose test (CONTOUR NEXT TEST STRIPS) strips Use 1 each As Directed 4 (four) times a day. 150 strip 5     generic lancets Use 1 each As Directed 4 (four) times a day. 100 each 4     insulin aspart U-100 (NOVOLOG FLEXPEN U-100 INSULIN) 100 unit/mL injection pen Take 2 units with meal to keep 2 hour post prandial reading at or below 140. (Patient taking differently: Take 6 units with meal to keep 2 hour post prandial reading at or below 140.) 15 mL 0     pen needle, diabetic (BD ULTRA-FINE RAJ PEN NEEDLE) 32 gauge x 5/32\" Ndle Use 1 each As Directed once daily. 100 each 4     prenatal vitamin iron-folic acid 27mg-0.8mg (PRENATAL S) 27 mg iron- 800 mcg Tab tablet Take 1 tablet by mouth daily.       insulin detemir U-100 (LEVEMIR FLEXTOUCH U-100 INSULN) 100 unit/mL (3 mL) pen Take daily subcutaneously, up to 60 units daily (Patient taking differently: Take daily " subcutaneously, up to 70 units daily) 15 mL 5     No facility-administered medications prior to visit.

## 2021-06-19 NOTE — PROGRESS NOTES
[facility] GDM Care Plan      Assessment/Plan: pt seen today for f/u. She brings in BG log book. She is increasing her insulin appropriately. Up to 70 units at HS and 0/0/6 with meals. BG are well controlled. Ketones are normal. Pt is doing very well. She will continue to titrate as needed. Pt will call with any concerns prior to f/u.         Time: 30  Visit Type: pregnancy clinic   Provider: Megan   Provider's Diagnosis (per referral form): Gestational (648.83)  OGTT:   Results for orders placed or performed in visit on 07/29/14   GTT Gestational 2 Hour   Result Value Ref Range    Glucose, 2 Hour 236 (H) 70 - 154 mg/dL   GTT Gestational 1 Hour   Result Value Ref Range    Glucose, 1 Hour 257 (H) 70 - 179 mg/dL   GTT Gestational Fasting   Result Value Ref Range    Glucose, Fasting 122 (H) 70 - 94 mg/dL   GTT Gestational 3 Hour   Result Value Ref Range    Glucose, 3 hour 158 (H) 70 - 139 mg/dL      Estimated Date of Delivery: 9/17/18  Pregnancy #: 2  Previous GDM: Yes   Medications:   Current Outpatient Prescriptions:      acetone, urine, test (ACETONE, URINE, TEST) Strp, Use 1 each As Directed every morning., Disp: 50 strip, Rfl: 4     blood glucose test (CONTOUR NEXT TEST STRIPS) strips, Use 1 each As Directed 4 (four) times a day., Disp: 150 strip, Rfl: 5     generic lancets, Use 1 each As Directed 4 (four) times a day., Disp: 100 each, Rfl: 4     insulin aspart U-100 (NOVOLOG FLEXPEN U-100 INSULIN) 100 unit/mL injection pen, Take 2 units with meal to keep 2 hour post prandial reading at or below 140. (Patient taking differently: Take 6 units with meal to keep 2 hour post prandial reading at or below 140.), Disp: 15 mL, Rfl: 0     insulin detemir U-100 (LEVEMIR FLEXTOUCH U-100 INSULN) 100 unit/mL (3 mL) pen, Take daily subcutaneously, up to 60 units daily (Patient taking differently: Take daily subcutaneously, up to 70 units daily), Disp: 15 mL, Rfl: 5     pen needle, diabetic (BD ULTRA-FINE RAJ PEN NEEDLE) 32 gauge x  "5/32\" Ndle, Use 1 each As Directed once daily., Disp: 100 each, Rfl: 4     prenatal vitamin iron-folic acid 27mg-0.8mg (PRENATAL S) 27 mg iron- 800 mcg Tab tablet, Take 1 tablet by mouth daily., Disp: , Rfl:       BG monitoring goals: Fasting <95; 1 hour post start of meal <140. Test 4 x per day.  Check fasting a.m. ketones: Yes  GDM meal pattern/carb counting taught per guidelines: Yes    Past Goals:  Nutrition: GDM meal plan MET  Activity: Walking after meals when able/staying active MET  Monitoring: BG 4x/day as directed, ketones every morning MET      New Goals:  Nutrition: GDM meal plan   Activity: Walking after meals when able/staying active   Monitoring: BG 4x/day as directed, ketones every morning    DIABETES CARE PLAN AND EDUCATION RECORD    Gestational Diabetes Disease Process/Preconception Care/Management During Pregnancy/Postpartum:Discussed    Meter (per above goals): Discussed    Nutrition Management    Weight: Assessed and Discussed  Portions/Balance: Assessed and Discussed  Carb ID/Count: Assessed and Discussed  Label Reading: Assessed and Discussed  Menu Planning: Assessed and Discussed  Dining Out: Assessed and Discussed  Physical Activity: Assessed and Discussed    Acute Complications: Prevent, Detect, Treat:    Goal Setting and Problem Solving: Assessed and Discussed  Barriers: Assessed and Discussed  Psychosocial Adjustments: Assessed and Discussed    I agree with the aforementioned diabetes plan.  C[facility] VASQUEZ [facility]  [facility] Endocrinology  8/1/2018  8:40 AM    "

## 2021-06-19 NOTE — PROGRESS NOTES
[facility]  ENDOCRINOLOGY    Gestational Diabetes 2018    Yokasta Gary, 1985, [id number]          Reason for visit      1. Insulin controlled gestational diabetes mellitus (GDM) in second trimester        HPI     Yokasta Gary is a very pleasant 33 y.o. old female who presents for GESTATIONAL Diabetes Mellitus.  She is currently 28w3d  pregnant . Due date is 18  Diagnosed with GDM based on an OGTT. She hashad  GDM in prior pregnancies.   Current carbohydrate intake:consistent with recommendations of 30g-60g-60g.  I have reviewed her blood glucose logs and note that the:  Fasting readings  are:above range on current regimen  Postprandial readings are:in range on current regimen  Current Levemir dose: 42  Current Prandial insulin: 0  Blood glucose logs/meter brought in and data reviewed and incorporated into decision-making.  [facility]  Therapy/Interventions in the past:  She has been seen by the Diabetes Educator- and has received instruction on carbohydrate counting and  consistency.  Records from referring provider and other sources have also been reviewed and incorporated into decision-making.      TODAY:  Yokasta returns today in f/u for GDM.  She has been titrating appropriately, and is now up to 42 units at HS. She has had a couple of elevations in the last several days, so we will increase her insulin.  Her dinners are also with some elevations and I feel it appropriate to get her started on some Novolog at dinner time.  We will start with 2 units.   has no current concerns, in spite of the fact that baby is measuring 2 weeks ahead.  NSTs and BPPs start in two weeks.  Baby is moving appropriately and she is having no swelling. BP is good.    Past Medical History       Patient Active Problem List   Diagnosis     Pregnancy     Insulin controlled gestational diabetes mellitus (GDM) in second trimester        Past Surgical History     Past Surgical  "History:   Procedure Laterality Date      SECTION N/A 10/17/2014    Procedure:  SECTION;  Surgeon: Maureen Chanel MD;  Location: [facility];  Service:        Family History     History reviewed. No pertinent family history.    Social History     Social History   Substance Use Topics     Smoking status: Former Smoker     Smokeless tobacco: Never Used     Alcohol use No       Review of Systems     Patient has no polyuria or polydipsia, no chest pain, dyspnea or TIA's, no numbness, tingling or pain in extremities  Remainder negative except as noted in HPI.      Vital Signs     /68 (Patient Site: Right Arm, Patient Position: Sitting, Cuff Size: Adult Regular)  Pulse 80  Ht 5' 3\" (1.6 m)  Wt (!) 276 lb 1.6 oz (125.2 kg)  BMI 48.91 kg/m2  Wt Readings from Last 3 Encounters:   18 (!) 276 lb 1.6 oz (125.2 kg)   18 (!) 276 lb 11.2 oz (125.5 kg)   18 (!) 275 lb 14.4 oz (125.1 kg)       Physical Exam     GENERAL: Pleasant, alert, appropriate appearance for age. No acute distress,   HEENT: Normocephalic, atraumatic  NECK: Supple, no masses or  lymph nodes.  THYROID: No nodules or enlargement. Non-tender, no bruit  CHEST/RESPIRATORY: Normal chest wall and respirations. Clear to auscultation.  CARDIOVASCULAR: Regular rate and rhythm. S1, S2, no murmur, click, gallop, or rubs.  ABDOMEN: Gravid   LYMPHATIC: No palpable nodes in neck, supraclavicular,  SKIN: No melanosis,  ecchymosis,  vitiligo. No acanthosis nigricans  NEURO:  Non-focal, normal DTRs; no tremor.  PSYCH: Alert and oriented -appropriate affect. Orientation, judgement and memory appear intact.  MSK: No joint abnormalities, FROM in all four extremities. No kyphosis    Assessment     1. Insulin controlled gestational diabetes mellitus (GDM) in second trimester        Plan     1. GESTATIONAL DIABETES-  Adjust dose as follows:    -Levemir [id number]   units. Increase by 2 units every 2 days to keep fasting blood " "glucose below 95mg/dL  -Novolog 0  units with breakfast  -Novolog 0 units with lunch   -Novolog 2 units with dinner  -Increase by 2 units every 2 days to keep 1 hour after meal blood glucose less than 140mg/dL    We reviewed glucose goals of fasting blood glucose <95 mg/dL and 1 hour post prandial blood glucose of <140 mg/dL.    Monitor blood sugar 4 times daily: Fasting  and 1 hour after each meal.  Contact  this clinic [phone] if blood glucose is not within the above-mentioned goals.     We discussed the importance of excellent glycemic control during pregnancy to limit complications such as fetal macrosomia, shoulder dystocia,  hypoglycemia and hyperbilirubinemia.  I have discussed the patient's increased risk of recurrent GDM and/or development of type 2 diabetes later in life.      She will f/u in 2 weeks.  Time spent with pt today: 25 min with >50% spent in counseling and coordination of care.          Cher Tyler  2018        Lab Results     No results found for: HGBA1C, CREATININE, MICROALBUR    No results found for: CHOL, HDL, LDLCALC, TRIG    No results found for: ALT, AST, GGT, ALKPHOS, BILITOT      Current Medications     Outpatient Medications Prior to Visit   Medication Sig Dispense Refill     acetone, urine, test (ACETONE, URINE, TEST) Strp Use 1 each As Directed every morning. 50 strip 4     blood glucose test (CONTOUR NEXT TEST STRIPS) strips Use 1 each As Directed 4 (four) times a day. 150 strip 5     generic lancets Use 1 each As Directed 4 (four) times a day. 100 each 4     insulin detemir U-100 (LEVEMIR FLEXTOUCH U-100 INSULN) 100 unit/mL (3 mL) pen Take daily subcutaneously, up to 60 units daily 15 mL 5     pen needle, diabetic (BD ULTRA-FINE RAJ PEN NEEDLE) 32 gauge x 5/32\" Ndle Use 1 each As Directed once daily. 100 each 4     prenatal vitamin iron-folic acid 27mg-0.8mg (PRENATAL S) 27 mg iron- 800 mcg Tab tablet Take 1 tablet by mouth daily.       No " facility-administered medications prior to visit.

## 2021-06-19 NOTE — PROGRESS NOTES
[facility] GDM Care Plan      Assessment/Plan: pt seen today for f/u. She was late to appt so she missed her appt with Romy. Pt is up to 80 units at HS and 0/0/8 with meals. She forgot her BG log today as she was running late. Pt denies having any low BG.   Reports after breakfast and lunch are all <140. Reports after dinner is elevated 1-2x/week in the 140's. No changes today, pt seems to be doing well.   Pt is planning to have c/s on 9/10. She will continue with everything until delivery and call with any concerns prior. Discussed pp care.       Time: 30  Visit Type: pregnancy clinic   Provider: Megan   Provider's Diagnosis (per referral form): Gestational (648.83)  OGTT:   Results for orders placed or performed in visit on 07/29/14   GTT Gestational 2 Hour   Result Value Ref Range    Glucose, 2 Hour 236 (H) 70 - 154 mg/dL   GTT Gestational 1 Hour   Result Value Ref Range    Glucose, 1 Hour 257 (H) 70 - 179 mg/dL   GTT Gestational Fasting   Result Value Ref Range    Glucose, Fasting 122 (H) 70 - 94 mg/dL   GTT Gestational 3 Hour   Result Value Ref Range    Glucose, 3 hour 158 (H) 70 - 139 mg/dL      Estimated Date of Delivery: None noted. 9/17/18  Pregnancy #: 2  Previous GDM: Yes   Medications:   Current Outpatient Prescriptions:      acetone, urine, test (ACETONE, URINE, TEST) Strp, Use 1 each As Directed every morning., Disp: 50 strip, Rfl: 4     blood glucose test (CONTOUR NEXT TEST STRIPS) strips, Use 1 each As Directed 4 (four) times a day., Disp: 150 strip, Rfl: 5     generic lancets, Use 1 each As Directed 4 (four) times a day., Disp: 100 each, Rfl: 4     insulin aspart U-100 (NOVOLOG FLEXPEN U-100 INSULIN) 100 unit/mL injection pen, Take 2 units with meal to keep 2 hour post prandial reading at or below 140. (Patient taking differently: Take 6 units with meal to keep 2 hour post prandial reading at or below 140.), Disp: 15 mL, Rfl: 0     insulin detemir U-100 (LEVEMIR FLEXTOUCH U-100 INSULN) 100 unit/mL (3 mL)  "pen, 70 units daily at bedtime, increase as instructed, up to 90 units daily., Disp: 30 mL, Rfl: 1     pen needle, diabetic (BD ULTRA-FINE RAJ PEN NEEDLE) 32 gauge x 5/32\" Ndle, Use 1 each As Directed once daily., Disp: 100 each, Rfl: 4     prenatal vitamin iron-folic acid 27mg-0.8mg (PRENATAL S) 27 mg iron- 800 mcg Tab tablet, Take 1 tablet by mouth daily., Disp: , Rfl:       BG monitoring goals: Fasting <95; 1 hour post start of meal <140. Test 4 x per day.  Check fasting a.m. ketones: Yes  GDM meal pattern/carb counting taught per guidelines: Yes    Past Goals:  Nutrition: GDM meal plan MET  Activity: Walking after meals when able/staying active MET  Monitoring: BG 4x/day as directed, ketones every morning MET      New Goals:  Nutrition: GDM meal plan   Activity: Walking after meals when able/staying active   Monitoring: BG 4x/day as directed, ketones every morning    DIABETES CARE PLAN AND EDUCATION RECORD    Gestational Diabetes Disease Process/Preconception Care/Management During Pregnancy/Postpartum:Discussed    Meter (per above goals): Discussed    Nutrition Management    Weight: Assessed and Discussed  Portions/Balance: Assessed and Discussed  Carb ID/Count: Assessed and Discussed  Label Reading: Assessed and Discussed  Menu Planning: Assessed and Discussed  Dining Out: Assessed and Discussed  Physical Activity: Assessed and Discussed    Acute Complications: Prevent, Detect, Treat:    Goal Setting and Problem Solving: Assessed and Discussed  Barriers: Assessed and Discussed  Psychosocial Adjustments: Assessed and Discussed    I agree with the aforementioned diabetes plan.  Cher VASQUEZ [facility] Endocrinology  8/15/2018  9:45 AM    "

## 2021-06-20 NOTE — LETTER
Letter by Leslie Lee at      Author: Leslie Lee Service: -- Author Type: --    Filed:  Encounter Date: 4/28/2020 Status: (Other)         April 28, 2020       Yokasta Gary  [address]  [place] [address]    Dear Yokasta Gary:    We are pleased to provide you with secure, online access to medical information for you and your family within [facility]uvvu. Per your request, we have expanded your account to allow access to the records of the following family members:              Beverley Gary (privilege ends on 10/16/2026.)     How Do I Log In?  1. In your Internet browser, go to [email or web address]  2. Log into Zuvvu using your Zuvvu Username and Password.  3. Click Sign In.        How Do I Access a Family Member's Account?  4. Select the account you want to access by clicking the [place] with the appropriate patient's name at the top of your screen.   5. You will see a disclaimer page letting you know that you will be viewing a family member's record. Review the disclaimer and then click Accept Proxy Access Disclaimer to proceed.  6. Once you switch to viewing a family member's record, you can navigate to Zuvvu pages the same way you would for yourself. You can return to your own account by clicking the [place] at the top of the screen with your name on it.    7. To customize the colors and names of the linked accounts, you can select Personalize from the Profile dropdown menu at the top of the screen, then click the Edit button to make changes.     Additional Information  If you have questions, visit [email or web address], e-mail [email or web address] or call [phone] to talk to our Zuvvu staff. Remember, Zuvvu is NOT to be used for urgent needs. For medical emergencies, dial 911.

## 2021-06-20 NOTE — ANESTHESIA POSTPROCEDURE EVALUATION
Patient: Yokasta Gary   SECTION, REPEAT  Anesthesia type: spinal    Patient location: OB 12  Last vitals:   Vitals:    18 0447   BP: 133/68   Pulse: 66   Resp: 16   Temp: 36.8  C (98.3  F)   SpO2: 98%   Pt satisfied with epidural for labor, also used for C-S plus had TAP blocks.  Post vital signs: stable  Level of consciousness: alert and conversant  Post-anesthesia pain: pain controlled  Post-anesthesia nausea and vomiting: no  Pulmonary: unassisted, return to baseline  Cardiovascular: stable and blood pressure at baseline  Hydration: adequate  Anesthetic events: no    QCDR Measures:  ASA# 11 - Andreea-op Cardiac Arrest: ASA11B - Patient did NOT experience unanticipated cardiac arrest  ASA# 12 - Andreea-op Mortality Rate: ASA12B - Patient did NOT die  ASA# 13 - PACU Re-Intubation Rate: ASA13B - Patient did NOT require a new airway mgmt  ASA# 10 - Composite Anes Safety: ASA10A - No serious adverse event    Additional Notes:

## 2021-06-20 NOTE — ANESTHESIA PROCEDURE NOTES
CSE Block      Start time: 9/10/2018 1:47 PM  End time: 9/10/2018 1:53 PM  Reason for block: primary anesthetic    Preanesthetic Checklist  Completed: patient identified, risks, benefits, and alternatives discussed, timeout performed, consent obtained, airway assessed, oxygen available, suction available, emergency drugs available and hand hygiene performed    CSE  Patient position: sitting  Prep: ChloraPrep  Patient monitoring: heart rate, continuous pulse ox and blood pressure  Approach: midline  Location: L3-L4  Injection technique: JONATAN saline  Number of Attempts:1  Spinal Needle  Needle type: Waldo.   Needle gauge: 25 G  Epidural Needle and Catheter:   Needle type: Other (Vazquez)     : JONATAN at 7 cm. Catheter  at 14 cm at skin.

## 2021-06-20 NOTE — ANESTHESIA CARE TRANSFER NOTE
Last vitals:   Vitals:    09/10/18 1534   BP: 118/52   Pulse: 92   Resp: 14   Temp: 36.6  C (97.9  F)   SpO2: 99%     Patient's level of consciousness is drowsy  Spontaneous respirations: yes  Maintains airway independently: yes  Dentition unchanged: yes  Oropharynx: oropharynx clear of all foreign objects    QCDR Measures:  ASA# 20 - Surgical Safety Checklist: WHO surgical safety checklist completed prior to induction  PQRS# 430 - Adult PONV Prevention: 4558F - Pt received => 2 anti-emetic agents (different classes) preop & intraop  ASA# 8 - Peds PONV Prevention: NA - Not pediatric patient, not GA or 2 or more risk factors NOT present  PQRS# 424 - Andreea-op Temp Management: 4559F - At least one body temp DOCUMENTED => 35.5C or 95.9F within required timeframe  PQRS# 426 - PACU Transfer Protocol: - Transfer of care checklist used  ASA# 14 - Acute Post-op Pain: ASA14B - Patient did NOT experience pain >= 7 out of 10

## 2021-06-20 NOTE — ANESTHESIA PREPROCEDURE EVALUATION
Anesthesia Evaluation      Patient summary reviewed   History of anesthetic complications (Motion sickness, PONV)     Airway   Mallampati: III  Neck ROM: full   Pulmonary - normal exam   (+) a smoker (former)                         Cardiovascular - negative ROS  Exercise tolerance: > or = 4 METS  Rhythm: regular        Neuro/Psych - negative ROS     Endo/Other    (+) diabetes mellitus (GDM) using insulin, obesity (BMI 50), pregnant     GI/Hepatic/Renal    (+) GERD well controlled,        Other findings:      at 38w presents for repeat c/section. One previous c/s. Presented 9/3/18 with vag bleeding but was not admitted. No further bleeding.    NPO 11 PM       Results for CUAUHTEMOC RENDON (MRN [id number]) as of 9/10/2018    9/10/2018 12:19  Hemoglobin: 12.5          Dental - normal exam                        Anesthesia Plan  Planned anesthetic: peripheral nerve block, spinal and epidural      CSE: SAB with marcaine and fentanyl 25 mcg  TAP blocks for post-op pain as requested by surgeon  [place] gtt in line          ASA 3     Anesthetic plan and risks discussed with: patient and spouse  Anesthesia plan special considerations: antiemetics,   Post-op plan: routine recovery        Fadumo Sin MD  Staff Anesthesiologist  [facility]  9/10/18

## 2021-06-20 NOTE — LETTER
Letter by Leslie Lee at      Author: Leslie Lee Service: -- Author Type: --    Filed:  Encounter Date: 4/28/2020 Status: (Other)         April 28, 2020       Yokasta Gary  [address] [place] [address]    Dear Yokasta Gary:    We are pleased to provide you with secure, online access to medical information for you and your family within [facility] [employer]. Per your request, we have expanded your account to allow access to the records of the following family members:              Beverley Gary (privilege ends on 10/16/2026.)            Tristian Gary (privilege ends on 9/9/2030.)     How Do I Log In?  1. In your Internet browser, go to [email or web address]  2. Log into Stukent using your Stukent Username and Password.  3. Click Sign In.        How Do I Access a Family Member's Account?  4. Select the account you want to access by clicking the [place] with the appropriate patient's name at the top of your screen.   5. You will see a disclaimer page letting you know that you will be viewing a family member's record. Review the disclaimer and then click Accept Proxy Access Disclaimer to proceed.  6. Once you switch to viewing a family member's record, you can navigate to Stukent pages the same way you would for yourself. You can return to your own account by clicking the [place] at the top of the screen with your name on it.    7. To customize the colors and names of the linked accounts, you can select Personalize from the Profile dropdown menu at the top of the screen, then click the Edit button to make changes.     Additional Information  If you have questions, visit [email or web address], e-mail [email or web address] or call [phone] to talk to our Stukent staff. Remember, Stukent is NOT to be used for urgent needs. For medical emergencies, dial 911.

## 2021-06-20 NOTE — LETTER
Letter by Leslie Lee at      Author: Leslie Lee Service: -- Author Type: --    Filed:  Encounter Date: 4/28/2020 Status: (Other)         April 28, 2020       Yokasta Gary  [address]  [place] [address]    Dear Yokasta Gary:    We are pleased to provide you with secure, online access to medical information for you and your family within [facility]. Per your request, we have expanded your account to allow access to the records of the following family members:              Beverley Gary (privilege ends on 10/16/2026.)     How Do I Log In?  1. In your Internet browser, go to [email or web address]  2. Log into Framebench using your Framebench Username and Password.  3. Click Sign In.        How Do I Access a Family Member's Account?  4. Select the account you want to access by clicking the [place] with the appropriate patient's name at the top of your screen.   5. You will see a disclaimer page letting you know that you will be viewing a family member's record. Review the disclaimer and then click Accept Proxy Access Disclaimer to proceed.  6. Once you switch to viewing a family member's record, you can navigate to Framebench pages the same way you would for yourself. You can return to your own account by clicking the [place] at the top of the screen with your name on it.    7. To customize the colors and names of the linked accounts, you can select Personalize from the Profile dropdown menu at the top of the screen, then click the Edit button to make changes.     Additional Information  If you have questions, visit [email or web address], e-mail [email or web address] or call [phone] to talk to our Framebench staff. Remember, Framebench is NOT to be used for urgent needs. For medical emergencies, dial 911.

## 2021-06-20 NOTE — ANESTHESIA PROCEDURE NOTES
Peripheral Block    Patient location during procedure: post-op  Start time: 9/10/2018 3:27 PM  End time: 9/10/2018 3:32 PM  post-op analgesia per surgeon order as noted in medical record  Staffing:  Performing  Anesthesiologist: FADUMO SIN  Preanesthetic Checklist  Completed: patient identified, site marked, risks, benefits, and alternatives discussed, timeout performed, consent obtained, airway assessed, oxygen available, suction available, emergency drugs available and hand hygiene performed  Peripheral Block  Block type: other, TAP  Prep: ChloraPrep  Patient position: supine  Patient monitoring: cardiac monitor, continuous pulse oximetry, heart rate and blood pressure  Anesthesia laterality: B/L.  Injection technique: ultrasound guided    Ultrasound used to visualize needle placement in proximity to nerve being blocked: yes   Permanent ultrasound image captured for medical record  Sterile gel and probe cover used for ultrasound.    Needle  Needle type: echogenic   Needle gauge: 20G  Needle length: 6 in  no peripheral nerve catheter placed  Assessment  Injection assessment: no difficulty with injection, negative aspiration for heme, no paresthesia on injection and incremental injection  Additional Notes      Confirmed no local anesthetic given by surgery team. Confirmed request for TAP block by surgery team. Bilateral transversus abdominis plane (TAP) blocks with bupivacaine 0.25% 20 cc per side. Patient tolerated procedure well.    Fadumo Sin MD  Staff Anesthesiologist  [facility]

## 2021-06-24 NOTE — PROGRESS NOTES
Chief Complaint   Patient presents with     Ear Pain     Right ear pain x 2 days not sure about fevers         HPI      Patient is here for 2 days of moderate right ear pain, preceded by over a week of a cough. Cough is much better now. No fever, nasal congestion, sore throat.    ROS: Pertinent ROS noted in HPI.     No Known Allergies    Patient Active Problem List   Diagnosis     Pregnancy     Insulin controlled gestational diabetes mellitus (GDM) in second trimester       No family history on file.    Social History     Socioeconomic History     Marital status:      Spouse name: Not on file     Number of children: Not on file     Years of education: Not on file     Highest education level: Not on file   Social Needs     Financial resource strain: Not on file     Food insecurity - worry: Not on file     Food insecurity - inability: Not on file     Transportation needs - medical: Not on file     Transportation needs - non-medical: Not on file   Occupational History     Not on file   Tobacco Use     Smoking status: Former Smoker     Last attempt to quit: 2009     Years since quittin.4     Smokeless tobacco: Never Used   Substance and Sexual Activity     Alcohol use: No     Drug use: No     Sexual activity: Yes     Partners: Male   Other Topics Concern     Not on file   Social History Narrative     Not on file         Objective:    Vitals:    19 1647   BP: 126/78   Pulse: 82   Resp: 20   Temp: 98.4  F (36.9  C)   SpO2: 97%       Gen:NAD  Throat: oropharynx clear, tonsils normal  Ears: R TM erythematous and bulging, L TM clear without effusion, ear canals normal with small cerumen  Nose: no discharge  Neck: No adenopathy  CV: RRR, normal S1S2, no M, R, G  Pulm: CTAB, normal effort      Acute right otitis media  -     amoxicillin (AMOXIL) 875 MG tablet; Take 1 tablet (875 mg total) by mouth 2 (two) times a day for 10 days.    OTC analgesics prn.

## 2021-06-26 ENCOUNTER — HEALTH MAINTENANCE LETTER (OUTPATIENT)
Age: 36
End: 2021-06-26

## 2021-10-16 ENCOUNTER — HEALTH MAINTENANCE LETTER (OUTPATIENT)
Age: 36
End: 2021-10-16

## 2021-11-09 ENCOUNTER — E-VISIT (OUTPATIENT)
Dept: URGENT CARE | Facility: URGENT CARE | Age: 36
End: 2021-11-09
Payer: COMMERCIAL

## 2021-11-09 DIAGNOSIS — R05.3 PERSISTENT COUGH: Primary | ICD-10-CM

## 2021-11-09 DIAGNOSIS — J34.89 SINUS PRESSURE: ICD-10-CM

## 2021-11-09 PROCEDURE — 99421 OL DIG E/M SVC 5-10 MIN: CPT | Performed by: PHYSICIAN ASSISTANT

## 2021-11-09 NOTE — PATIENT INSTRUCTIONS
Dear Yokasta Gary,    We are sorry you are not feeling well. Based on the responses you provided, it is recommended that you be seen in-person in urgent care so we can better evaluate your symptoms. Please click here to find the nearest urgent care location to you.   Thank you for trusting us with your care.    Shawn Santos PA-C

## 2021-11-12 ENCOUNTER — OFFICE VISIT (OUTPATIENT)
Dept: FAMILY MEDICINE | Facility: CLINIC | Age: 36
End: 2021-11-12
Payer: COMMERCIAL

## 2021-11-12 VITALS
HEART RATE: 99 BPM | DIASTOLIC BLOOD PRESSURE: 99 MMHG | TEMPERATURE: 97.8 F | OXYGEN SATURATION: 99 % | SYSTOLIC BLOOD PRESSURE: 157 MMHG | RESPIRATION RATE: 16 BRPM

## 2021-11-12 DIAGNOSIS — R05.9 COUGH: Primary | ICD-10-CM

## 2021-11-12 DIAGNOSIS — J01.00 ACUTE NON-RECURRENT MAXILLARY SINUSITIS: ICD-10-CM

## 2021-11-12 PROCEDURE — 99213 OFFICE O/P EST LOW 20 MIN: CPT | Performed by: EMERGENCY MEDICINE

## 2021-11-12 RX ORDER — BENZONATATE 100 MG/1
100 CAPSULE ORAL 3 TIMES DAILY PRN
Qty: 21 CAPSULE | Refills: 0 | Status: SHIPPED | OUTPATIENT
Start: 2021-11-12 | End: 2022-01-26

## 2021-11-12 RX ORDER — AMOXICILLIN 500 MG/1
500 CAPSULE ORAL 2 TIMES DAILY
Qty: 14 CAPSULE | Refills: 0 | Status: SHIPPED | OUTPATIENT
Start: 2021-11-12 | End: 2021-11-19

## 2021-11-12 NOTE — PROGRESS NOTES
Impression:  1.  Upper respiratory infection 2.  Sinusitis with symptoms going on greater than 10 days    Plan:  Amoxicillin for acute sinusitis, Tessalon as needed for cough, fluids, rest, Tylenol, follow-up with primary care if not better in 1 week      Chief Complaint:  Patient presents with:  Nose Problem: x1wk, cough, sinus pressure/drainage, can't sleep a night coughing so bad         HPI:   Yokasta Gary is a 36 year old female who presents to this clinic for the evaluation of cough and sinus pressure.  Patient complains of 12-day history of an illness characterized by barky cough, nasal congestion, sinus pressure.  No fever or chills.  She has been vaccinated for Covid.  She is checked several home Covid test which are negative.  No nausea or vomiting.  No shortness of breath.  The coughing keeps her awake at night      PMH:   No past medical history on file.  Past Surgical History:   Procedure Laterality Date     ANTERIOR CRUCIATE LIGAMENT REPAIR Left       SECTION N/A 10/17/2014    Procedure:  SECTION;  Surgeon: Maureen Chanel MD;  Location: [facility];  Service:       SECTION N/A 9/10/2018    Procedure:  SECTION, REPEAT;  Surgeon: Dorota Guzman MD;  Location: [facility];  Service:          ROS:  All other systems negative    Meds:    Current Outpatient Medications:      amoxicillin (AMOXIL) 500 MG capsule, Take 1 capsule (500 mg) by mouth 2 times daily for 7 days, Disp: 14 capsule, Rfl: 0     benzonatate (TESSALON) 100 MG capsule, Take 1 capsule (100 mg) by mouth 3 times daily as needed for cough, Disp: 21 capsule, Rfl: 0     levonorgestrel (MIRENA, 52 MG,) 20 MCG/24HR IUD, 1 each by Intrauterine route once, Disp: , Rfl:      Phenylephrine-DM-GG-APAP (VICKS DAYQUIL SEVERE COLD/FLU PO), , Disp: , Rfl:      generic lancets, [GENERIC LANCETS] Use 1 each As Directed 4 (four) times a day., Disp: 100 each, Rfl: 4     oxyCODONE (ROXICODONE) 5 MG  "immediate release tablet, [OXYCODONE (ROXICODONE) 5 MG IMMEDIATE RELEASE TABLET] Take 1-2 tablets (5-10 mg total) by mouth every 6 (six) hours as needed., Disp: 13 tablet, Rfl: 0     pen needle, diabetic (BD ULTRA-FINE RAJ PEN NEEDLE) 32 gauge x 5\" Ndle, [PEN NEEDLE, DIABETIC (BD ULTRA-FINE RAJ PEN NEEDLE) 32 GAUGE X 532\" NDLE] Use 1 each As Directed once daily., Disp: 100 each, Rfl: 4     prenatal vitamin iron-folic acid 27mg-0.8mg (PRENATAL S) 27 mg iron- 800 mcg Tab tablet, [PRENATAL VITAMIN IRON-FOLIC ACID 27MG-0.8MG (PRENATAL S) 27 MG IRON- 800 MCG TAB TABLET] Take 1 tablet by mouth daily., Disp: , Rfl:         Social:  Social History     Socioeconomic History     Marital status:      Spouse name: Not on file     Number of children: Not on file     Years of education: Not on file     Highest education level: Not on file   Occupational History     Not on file   Tobacco Use     Smoking status: Former Smoker     Quit date: 2009     Years since quittin.2     Smokeless tobacco: Never Used   Substance and Sexual Activity     Alcohol use: No     Drug use: No     Sexual activity: Yes     Partners: Male   Other Topics Concern     Not on file   Social History Narrative     Not on file     Social Determinants of Health     Financial Resource Strain: Not on file   Food Insecurity: Not on file   Transportation Needs: Not on file   Physical Activity: Not on file   Stress: Not on file   Social Connections: Not on file   Intimate Partner Violence: Not on file   Housing Stability: Not on file         Physical Exam:  Vitals:    21 1500   BP: (!) 157/99   Pulse: 99   Resp: 16   Temp: 97.8  F (36.6  C)   TempSrc: Oral   SpO2: 99%      Patient is awake, alert, no distress  Eyes: PERRL, EOMI  Head: Atraumatic and normocephalic, bilateral maxillary sinus tenderness  Lungs: Clear without distress  CV: Regular without murmur  Skin: No lesions or rash  Neuro: Normal motor and sensory function in all " extremities  Psych: Awake, alert, normally responsive      Results:    No results found for this or any previous visit (from the past 24 hour(s)).           Mario Kaur MD

## 2021-11-12 NOTE — PATIENT INSTRUCTIONS
Patient Education     Sinusitis (Antibiotic Treatment)    The sinuses are air-filled spaces within the bones of the face. They connect to the inside of the nose. Sinusitis is an inflammation of the tissue that lines the sinuses. Sinusitis can occur during a cold. It can also happen due to allergies to pollens and other particles in the air. Sinusitis can cause symptoms of sinus congestion and a feeling of fullness. A sinus infection causes fever, headache, and facial pain. There is often green or yellow fluid draining from the nose or into the back of the throat (post-nasal drip). You have been given antibiotics to treat this condition.   Home care    Take the full course of antibiotics as instructed. Don't stop taking them, even when you feel better.    Drink plenty of water, hot tea, and other liquids as directed by the healthcare provider. This may help thin nasal mucus. It also may help your sinuses drain fluids.    Heat may help soothe painful areas of your face. Use a towel soaked in hot water. Or,  the shower and direct the warm spray onto your face. Using a vaporizer along with a menthol rub at night may also help soothe symptoms.     An expectorant with guaifenesin may help thin nasal mucus and help your sinuses drain fluids. Talk with your provider or pharmacists before taking an over-the-counter (OTC) medicine if you have any questions about it or its side effects..    You can use an OTC decongestant, unless a similar medicine was prescribed to you. Nasal sprays work the fastest. Use one that contains phenylephrine or oxymetazoline. First blow your nose gently. Then use the spray. Don't use these medicines more often than directed on the label. If you do, your symptoms may get worse. You may also take pills that contain pseudoephedrine. Don t use products that combine multiple medicines. This is because side effects may be increased. Read labels. You can also ask the pharmacist for help. (People  Melody Dykes from [facility] lab was calling with a rejected test for pt. with high blood pressure should not use decongestants. They can raise blood pressure.) Talk with your provider or pharmacist if you have any questions about the medicine..    OTC antihistamines may help if allergies contributed to your sinusitis. Talk with your provider or pharmacist if you have any questions about the medicine..    Don't use nasal rinses or irrigation during an acute sinus infection, unless your healthcare provider tells you to. Rinsing may spread the infection to other areas in your sinuses.    Use acetaminophen or ibuprofen to control pain, unless another pain medicine was prescribed to you. If you have chronic liver or kidney disease or ever had a stomach ulcer, talk with your healthcare provider before using these medicines. Never give aspirin to anyone under age 18 who is ill with a fever. It may cause severe liver damage.    Don't smoke. This can make symptoms worse.    Follow-up care  Follow up with your healthcare provider, or as advised.   When to seek medical advice  Call your healthcare provider if any of these occur:     Facial pain or headache that gets worse    Stiff neck    Unusual drowsiness or confusion    Swelling of your forehead or eyelids    Symptoms don't go away in 10 days    Vision problems, such as blurred or double vision    Fever of 100.4 F (38 C) or higher, or as directed by your healthcare provider  Call 911  Call 911 if any of these occur:     Seizure    Trouble breathing    Feeling dizzy or faint    Fingernails, skin or lips look blue, purple , or gray  Prevention  Here are steps you can take to help prevent an infection:     Keep good hand washing habits.    Don t have close contact with people who have sore throats, colds, or other upper respiratory infections.    Don t smoke, and stay away from secondhand smoke.    Stay up to date with of your vaccines.  [employer] last reviewed this educational content on 12/1/2019 2000-2021 [employer]. All rights  reserved. This information is not intended as a substitute for professional medical care. Always follow your healthcare professional's instructions.

## 2022-01-26 ENCOUNTER — MYC MEDICAL ADVICE (OUTPATIENT)
Dept: SURGERY | Facility: CLINIC | Age: 37
End: 2022-01-26

## 2022-01-26 ENCOUNTER — VIRTUAL VISIT (OUTPATIENT)
Dept: SURGERY | Facility: CLINIC | Age: 37
End: 2022-01-26
Payer: COMMERCIAL

## 2022-01-26 VITALS — BODY MASS INDEX: 49.61 KG/M2 | WEIGHT: 280 LBS | HEIGHT: 63 IN

## 2022-01-26 DIAGNOSIS — R53.83 FATIGUE, UNSPECIFIED TYPE: ICD-10-CM

## 2022-01-26 DIAGNOSIS — E66.01 MORBID OBESITY (H): ICD-10-CM

## 2022-01-26 DIAGNOSIS — E66.01 OBESITY, CLASS III, BMI 40-49.9 (MORBID OBESITY) (H): Primary | ICD-10-CM

## 2022-01-26 PROCEDURE — 99215 OFFICE O/P EST HI 40 MIN: CPT | Mod: 95 | Performed by: EMERGENCY MEDICINE

## 2022-01-26 ASSESSMENT — MIFFLIN-ST. JEOR: SCORE: 1924.2

## 2022-01-26 NOTE — LETTER
"    2022         RE: Yokasta Gary  [address]  [place] [address]        Dear Colleague,    Thank you for referring your patient, Yokasta Gary, to the [facility]RICS [facility] [place]. Please see a copy of my visit note below.    60 minutes spent on the date of the encounter doing chart review, history and exam, documentation and further activities per the note    New Bariatric Surgery Consultation Note    2022    RE: Yokasta Gary  MR#: [id number]  : 1985      Referring provider: No flowsheet data found.    Chief Complaint/Reason for visit: evaluation for possible weight loss surgery    Dear Dorota Guzman MD (General),    I had the pleasure of seeing your patient, Yokasta Gary, to evaluate her obesity and consider her for possible weight loss surgery. As you know, Yokasta Gary is 36 year old.  She has a height of 5' 3\", a weight of 280 lbs 0 oz, and calculated Body mass index is 49.6 kg/m ..  Today we discussed our Bariatric Surgery program to address their weight related health. She has viewed our online seminar prior to this visit and on discussion today, have decided to pursue the surgical program. Given her gestational diabetes hx and strong family hx of type II diabetes in multiple family members she's favoring gastric bypass. Hx of mild dyspepsia sx would also likely benefit.    On exam during video she has a plethoric to slightly moonlike facies and we'll check cortisol/prolactin levels given her difficulties w/ weight loss in the past. No ovulation issues in the past and fertility was never a problem for her so likely less risk for pituitary dysfunction.    Factors that could affect the success/risk of their bariatric surgery include:  2 young kids.   had gastric bypass .   Has struggled for any meaningful weight reduction previously. .    Assessment & Plan   Problem List Items Addressed This " Visit        Digestive    Morbid obesity (H)      Other Visit Diagnoses     Obesity, Class III, BMI 40-49.9 (morbid obesity) (H)    -  Primary    Relevant Orders    CBC with platelets    Comprehensive metabolic panel    Cortisol    Ferritin    Hemoglobin A1c    Folate    Lipid Profile    Prolactin    Parathyroid Hormone Intact    TSH    T3 Free    Vitamin A    Vitamin B1 whole blood    Vitamin B12    25- OH-Vitamin D    Zinc    T4 free    Fatigue, unspecified type        Relevant Orders    CBC with platelets    Ferritin    TSH    T3 Free    T4 free         Plan:  1. Welcome to the bariatric program. Read through your owner's manual regularly.  2. Intake labs should be fasting first thing in the AM, ideally about 7-8am if possible.   3. Follow up with me in 1-2 months for examination visit  3. Dietician and psychology visits/education and clearances will start this winter.  4. Anticipate 4-6 months course prior to surgery if all gets back on track with backlog of patients.  5. Starting walking regimen for at least 10minutes daily either alone, with  or family. Aiming to build up to 150minutes/week by Spring.  6. Attend support group at least once.  7. Wean off caffeine the month prior to surgery.  8. Cut back on alcohol to no more than once weekly or less.           HISTORY OF PRESENT ILLNESS:  Weight Loss History Reviewed with Patient 1/26/2022   How long have you been overweight? Since early childhood   What is the most that you have ever weighed? 280   What is the most weight you have lost? 45   I have tried the following methods to lose weight Watching portions or calories, Exercise, Weight Watchers, Atkins type diet (low carb/high protein)   I have tried the following weight loss medications? (Check all that apply) None   Have you ever had weight loss surgery? No       CO-MORBIDITIES OF OBESITY INCLUDE:     1/26/2022   I have the following health issues associated with obesity: Pre-Diabetes   gestational  diabetes w/ her last pregnancy, using insulin, in 2018.    PAST MEDICAL HISTORY:  Past Medical History:   Diagnosis Date     COVID-19     [place] 2021.     Dyspepsia     rare gaviscon use       PAST SURGICAL HISTORY:  Past Surgical History:   Procedure Laterality Date     ANTERIOR CRUCIATE LIGAMENT REPAIR Left       SECTION N/A 10/17/2014    Procedure:  SECTION;  Surgeon: Maureen Chanel MD;  Location: [facility];  Service:       SECTION N/A 9/10/2018    Procedure:  SECTION, REPEAT;  Surgeon: Dorota Guzman MD;  Location: [facility];  Service:        FAMILY HISTORY:   Family History   Problem Relation Age of Onset     Impaired Fasting Glucose Mother      Diabetes Father        SOCIAL HISTORY:   Social History Questions Reviewed With Patient 2022   Which best describes your employment status (select all that apply) I work full-time   If you work, 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 Yes   Who do you have in your support network that can be available to help you for the first 2 weeks after surgery? My  and parents   Who can you count on for support throughout your weight loss surgery journey? My , family and friends   Can you afford 3 meals a day?  Yes   Can you afford 50-60 dollars a month for vitamins? Yes       HABITS:     2022   How often do you drink alcohol? Monthly or less   If you do drink alcohol, how many drinks might you have in a day? (one drink = 5 oz. wine, 1 can/bottle of beer, 1 shot liquor) 3 or 4   Have you ever used any of the following nicotine products? Cigarettes   If you previously used any of these products, what year did you quit? 2009   Have you or are you currently using street drugs or prescription strength medication for which you do not have a prescription for? No   Do you have a history of chemical dependency (alcohol or drug abuse)? No        PSYCHOLOGICAL HISTORY:   Psychological History Reviewed With Patient 1/26/2022   Have you ever attempted suicide? Never.   Have you had thoughts of suicide in the past year? No   Have you ever been hospitalized for mental illness or a suicide attempt? Never.   Do you have a history of chronic pain? No   Have you ever been diagnosed with fibromyalgia? No   Are you currently seeing a therapist or counselor?  No   Are you currently seeing a psychiatrist? No       ROS:     1/26/2022   Skin:  None of the above   HEENT: None of these   Musculoskeletal: None of the above   Cardiovascular: None of the above   Pulmonary: None of the above   Gastrointestinal: Heartburn: sitauationally, twice monthly if eating too spicy or tomato based meals.  No regular meds. Will have occ Gaviscon.   Genitourinary: None of the above   Hematological: None of the above   Neurological: None of the above   Female only: None of the above   heartburn treated w/ gaviscon.   had RNY gastric bypass last Fall. Doing well and she's ready to   Grandmother, Dad had type II diabetes, Mom prediabetic.   Has [employer] in house.   Has 7 and 3 yo children, overall active lifestyle. Feels tired all the time.   EATING BEHAVIORS:     1/26/2022   Have you or anyone else thought that you had an eating disorder? No   Do you currently binge eat (eat a large amount of food in a short time)? No   Are you an emotional eater? No   Do you get up to eat after falling asleep? No       EXERCISE:     1/26/2022   How often do you exercise? 1 to 2 times per week   What is the duration of your exercise (in minutes)? 30 Minutes   What types of exercise do you do? walking, other   What keeps you from being more active?  Lack of Time, Too tired       MEDICATIONS:  Current Outpatient Medications   Medication Sig Dispense Refill     levonorgestrel (MIRENA, 52 MG,) 20 MCG/24HR IUD 1 each by Intrauterine route once         ALLERGIES:  No Known Allergies    No recent lab  "work.    PHYSICAL EXAM:  Objective    Ht 1.6 m (5' 3\")   Wt 127 kg (280 lb)   BMI 49.60 kg/m    Vitals - Patient Reported  Weight (Patient Reported): 127 kg (280 lb)  Height (Patient Reported): 160 cm (5' 3\")  BMI (Based on Pt Reported Ht/Wt): 49.6      Vitals:  No vitals were obtained today due to virtual visit.    Physical Exam   GENERAL: Healthy, alert and no distress. Mild moonlike facies.  EYES: Eyes grossly normal to inspection.  No discharge or erythema, or obvious scleral/conjunctival abnormalities.  RESP: No audible wheeze, cough, or visible cyanosis.  No visible retractions or increased work of breathing.    SKIN: Visible skin clear. No significant rash, abnormal pigmentation or lesions.  NEURO: Cranial nerves grossly intact.  Mentation and speech appropriate for age.  PSYCH: Mentation appears normal, affect normal/bright, judgement and insight intact, normal speech and appearance well-groomed.  mallampati II airway on video.      In summary, Yokasta Gary has Class III obesity with a body mass index of Body mass index is 49.6 kg/m . kg/m2 and the comorbidities stated above. She completed an informational seminar and is a possible candidate for the laparoscopic gastric bypass.  She will have to complete the following pre-requisites:    Received weight loss goal of 0 lb prior to surgery.  Achieve clearance from dietitian to see surgeon.  Have preoperative laboratory tests drawn.  Psychological Evaluation with MMPI and clearance for weight loss surgery.      Today in the office we discussed gastric bypass surgery.  Preoperative, perioperative, and postoperative processes, management, and follow up were addressed.  Risks and benefits were outlined including the risk of death, staple line leak (1-2%), PE, DVT, ulcer, N/V, stricture, hernia, wound infection, weight regain, and vitamin deficiencies. I emphasized exercise and activity along with appropriate food choice as the main foundation for weight " loss with surgery providing surgical reinforcement of this. All questions were answered.  A goal sheet and support group handout were given to the patient.          Once the patient has completed the requirements in their task list and there are no further recommendations, the pt will be allowed to see the surgeon of her choice for consultation on the laparoscopic gastric bypass surgery. Patient verbalizes understanding of the process to surgery and expectations for the postoperative period including the need for lifelong lifestyle changes, vitamin supplementation, and laboratory monitoring.    Sincerely,     North Bray MD          Yokasta Gary is 36 year old  female who presents for a billable video visit today.    How would you like to obtain your AVS? MyChart  If dropped from the video visit, the video invitation should be resent by: Send to e-mail at: [email or web address]  Will anyone else be joining your video visit? No          Video-Visit Details    Type of service:  Video Visit    Video End Time (time video stopped): 1:00 PM  Originating Location (pt. Location): Home    Distant Location (provider location):  [facility] [place]     Platform used for Video Visit: Well      Again, thank you for allowing me to participate in the care of your patient.        Sincerely,        North Bray MD

## 2022-01-26 NOTE — PROGRESS NOTES
"60 minutes spent on the date of the encounter doing chart review, history and exam, documentation and further activities per the note    New Bariatric Surgery Consultation Note    2022    RE: Yokasta Gary  MR#: [id number]  : 1985      Referring provider: No flowsheet data found.    Chief Complaint/Reason for visit: evaluation for possible weight loss surgery    Dear Megan, Dorota Rob MD (General),    I had the pleasure of seeing your patient, Yokasta Gary, to evaluate her obesity and consider her for possible weight loss surgery. As you know, Yokasta Gary is 36 year old.  She has a height of 5' 3\", a weight of 280 lbs 0 oz, and calculated Body mass index is 49.6 kg/m ..  Today we discussed our Bariatric Surgery program to address their weight related health. She has viewed our online seminar prior to this visit and on discussion today, have decided to pursue the surgical program. Given her gestational diabetes hx and strong family hx of type II diabetes in multiple family members she's favoring gastric bypass. Hx of mild dyspepsia sx would also likely benefit.    On exam during video she has a plethoric to slightly moonlike facies and we'll check cortisol/prolactin levels given her difficulties w/ weight loss in the past. No ovulation issues in the past and fertility was never a problem for her so likely less risk for pituitary dysfunction.    Factors that could affect the success/risk of their bariatric surgery include:  2 young kids.   had gastric bypass .   Has struggled for any meaningful weight reduction previously. .    Assessment & Plan   Problem List Items Addressed This Visit        Digestive    Morbid obesity (H)      Other Visit Diagnoses     Obesity, Class III, BMI 40-49.9 (morbid obesity) (H)    -  Primary    Relevant Orders    CBC with platelets    Comprehensive metabolic panel    Cortisol    Ferritin    Hemoglobin A1c    Folate    Lipid " Profile    Prolactin    Parathyroid Hormone Intact    TSH    T3 Free    Vitamin A    Vitamin B1 whole blood    Vitamin B12    25- OH-Vitamin D    Zinc    T4 free    Fatigue, unspecified type        Relevant Orders    CBC with platelets    Ferritin    TSH    T3 Free    T4 free         Plan:  1. Welcome to the bariatric program. Read through your owner's manual regularly.  2. Intake labs should be fasting first thing in the AM, ideally about 7-8am if possible.   3. Follow up with me in 1-2 months for examination visit  3. Dietician and psychology visits/education and clearances will start this winter.  4. Anticipate 4-6 months course prior to surgery if all gets back on track with backlog of patients.  5. Starting walking regimen for at least 10minutes daily either alone, with  or family. Aiming to build up to 150minutes/week by Spring.  6. Attend support group at least once.  7. Wean off caffeine the month prior to surgery.  8. Cut back on alcohol to no more than once weekly or less.           HISTORY OF PRESENT ILLNESS:  Weight Loss History Reviewed with Patient 1/26/2022   How long have you been overweight? Since early childhood   What is the most that you have ever weighed? 280   What is the most weight you have lost? 45   I have tried the following methods to lose weight Watching portions or calories, Exercise, Weight Watchers, Atkins type diet (low carb/high protein)   I have tried the following weight loss medications? (Check all that apply) None   Have you ever had weight loss surgery? No       CO-MORBIDITIES OF OBESITY INCLUDE:     1/26/2022   I have the following health issues associated with obesity: Pre-Diabetes   gestational diabetes w/ her last pregnancy, using insulin, in 2018.    PAST MEDICAL HISTORY:  Past Medical History:   Diagnosis Date     COVID-19     Marita 2021.     Dyspepsia     rare gaviscon use       PAST SURGICAL HISTORY:  Past Surgical History:   Procedure Laterality Date      ANTERIOR CRUCIATE LIGAMENT REPAIR Left       SECTION N/A 10/17/2014    Procedure:  SECTION;  Surgeon: Maureen Chanel MD;  Location: [facility] OR;  Service:       SECTION N/A 9/10/2018    Procedure:  SECTION, REPEAT;  Surgeon: Dorota Guzman MD;  Location: [facility] OR;  Service:        FAMILY HISTORY:   Family History   Problem Relation Age of Onset     Impaired Fasting Glucose Mother      Diabetes Father        SOCIAL HISTORY:   Social History Questions Reviewed With Patient 2022   Which best describes your employment status (select all that apply) I work full-time   If you work, 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 Yes   Who do you have in your support network that can be available to help you for the first 2 weeks after surgery? My  and parents   Who can you count on for support throughout your weight loss surgery journey? My , family and friends   Can you afford 3 meals a day?  Yes   Can you afford 50-60 dollars a month for vitamins? Yes       HABITS:     2022   How often do you drink alcohol? Monthly or less   If you do drink alcohol, how many drinks might you have in a day? (one drink = 5 oz. wine, 1 can/bottle of beer, 1 shot liquor) 3 or 4   Have you ever used any of the following nicotine products? Cigarettes   If you previously used any of these products, what year did you quit? 2009   Have you or are you currently using street drugs or prescription strength medication for which you do not have a prescription for? No   Do you have a history of chemical dependency (alcohol or drug abuse)? No       PSYCHOLOGICAL HISTORY:   Psychological History Reviewed With Patient 2022   Have you ever attempted suicide? Never.   Have you had thoughts of suicide in the past year? No   Have you ever been hospitalized for mental illness or a suicide attempt? Never.   Do you  "have a history of chronic pain? No   Have you ever been diagnosed with fibromyalgia? No   Are you currently seeing a therapist or counselor?  No   Are you currently seeing a psychiatrist? No       ROS:     1/26/2022   Skin:  None of the above   HEENT: None of these   Musculoskeletal: None of the above   Cardiovascular: None of the above   Pulmonary: None of the above   Gastrointestinal: Heartburn: sitauationally, twice monthly if eating too spicy or tomato based meals.  No regular meds. Will have occ Gaviscon.   Genitourinary: None of the above   Hematological: None of the above   Neurological: None of the above   Female only: None of the above   heartburn treated w/ gaviscon.   had RNY gastric bypass last Fall. Doing well and she's ready to   Grandmother, Dad had type II diabetes, Mom prediabetic.   Has [employer] in house.   Has 7 and 3 yo children, overall active lifestyle. Feels tired all the time.   EATING BEHAVIORS:     1/26/2022   Have you or anyone else thought that you had an eating disorder? No   Do you currently binge eat (eat a large amount of food in a short time)? No   Are you an emotional eater? No   Do you get up to eat after falling asleep? No       EXERCISE:     1/26/2022   How often do you exercise? 1 to 2 times per week   What is the duration of your exercise (in minutes)? 30 Minutes   What types of exercise do you do? walking, other   What keeps you from being more active?  Lack of Time, Too tired       MEDICATIONS:  Current Outpatient Medications   Medication Sig Dispense Refill     levonorgestrel (MIRENA, 52 MG,) 20 MCG/24HR IUD 1 each by Intrauterine route once         ALLERGIES:  No Known Allergies    No recent lab work.    PHYSICAL EXAM:  Objective    Ht 1.6 m (5' 3\")   Wt 127 kg (280 lb)   BMI 49.60 kg/m    Vitals - Patient Reported  Weight (Patient Reported): 127 kg (280 lb)  Height (Patient Reported): 160 cm (5' 3\")  BMI (Based on Pt Reported Ht/Wt): 49.6      Vitals:  No vitals " were obtained today due to virtual visit.    Physical Exam   GENERAL: Healthy, alert and no distress. Mild moonlike facies.  EYES: Eyes grossly normal to inspection.  No discharge or erythema, or obvious scleral/conjunctival abnormalities.  RESP: No audible wheeze, cough, or visible cyanosis.  No visible retractions or increased work of breathing.    SKIN: Visible skin clear. No significant rash, abnormal pigmentation or lesions.  NEURO: Cranial nerves grossly intact.  Mentation and speech appropriate for age.  PSYCH: Mentation appears normal, affect normal/bright, judgement and insight intact, normal speech and appearance well-groomed.  mallampati II airway on video.      In summary, Yokasta Gary has Class III obesity with a body mass index of Body mass index is 49.6 kg/m . kg/m2 and the comorbidities stated above. She completed an informational seminar and is a possible candidate for the laparoscopic gastric bypass.  She will have to complete the following pre-requisites:    Received weight loss goal of 0 lb prior to surgery.  Achieve clearance from dietitian to see surgeon.  Have preoperative laboratory tests drawn.  Psychological Evaluation with MMPI and clearance for weight loss surgery.      Today in the office we discussed gastric bypass surgery.  Preoperative, perioperative, and postoperative processes, management, and follow up were addressed.  Risks and benefits were outlined including the risk of death, staple line leak (1-2%), PE, DVT, ulcer, N/V, stricture, hernia, wound infection, weight regain, and vitamin deficiencies. I emphasized exercise and activity along with appropriate food choice as the main foundation for weight loss with surgery providing surgical reinforcement of this. All questions were answered.  A goal sheet and support group handout were given to the patient.          Once the patient has completed the requirements in their task list and there are no further recommendations, the  pt will be allowed to see the surgeon of her choice for consultation on the laparoscopic gastric bypass surgery. Patient verbalizes understanding of the process to surgery and expectations for the postoperative period including the need for lifelong lifestyle changes, vitamin supplementation, and laboratory monitoring.    Sincerely,     North Bray MD

## 2022-01-26 NOTE — PROGRESS NOTES
Yokasta PRITCHETT Abdirahman is 36 year old  female who presents for a billable video visit today.    How would you like to obtain your AVS? MyChart  If dropped from the video visit, the video invitation should be resent by: Send to e-mail at: [email or web address]  Will anyone else be joining your video visit? No          Video-Visit Details    Type of service:  Video Visit    Video End Time (time video stopped): 1:00 PM  Originating Location (pt. Location): Home    Distant Location (provider location):  [facility] [place]     Platform used for Video Visit: [employer]

## 2022-01-26 NOTE — PATIENT INSTRUCTIONS
Plan:  1. Welcome to the bariatric program. Read through your owner's manual regularly.  2. Intake labs should be fasting first thing in the AM, ideally about 7-8am if possible.   3. Follow up with me in 1-2 months for examination visit  3. Dietician and psychology visits/education and clearances will start this winter.  4. Anticipate 4-6 months course prior to surgery if all gets back on track with backlog of patients.  5. Starting walking regimen for at least 10minutes daily either alone, with  or family. Aiming to build up to 150minutes/week by Spring.  6. Attend support group at least once.  7. Wean off caffeine the month prior to surgery.  8. Cut back on alcohol to no more than once weekly or less.     Welcome to [facility]!      We are grateful that you have chosen us to partner with you on your journey to better health!  We have included some very important information for you to read as you begin your journey towards weight loss surgery. We will discuss parts of this as you move closer to surgery but please reach out if you have any questions or concerns.      Please click on the following links and they will lead you to a printable version of each handout or copy into your browser to view/print at home:    Making Your Decision, Understanding Weight Loss Surgery  [email or web address]    A Roadmap to Your Weight Loss Surgery  [email or web address]    Honoring Choices - Your Rights  [email or web address]    Honoring Choices - [place] Health Care Directive (form that can be filled out)  [email or web address]      Insurance Coverage and Approval for Surgery  Every insurance plan is different.  Many companies approve benefits for surgery, but many have specific requirements that must be completed prior to being approved.    Verification of benefits is the patient's responsibility.  You will be responsible for any charges not  covered by your insurance plan - including, but not limited to copays, deductible, out of pocket, any amount over your cap limit, etc.  Using the guideline below, please contact your insurance plan (we recommend you call the Customer Service number on the back of your card).      Once all of the requirements for surgery are complete, you will see the surgeon.  Following this visit, we will submit your information to your insurance plan for Prior Authorization.  We strongly encourage you to submit any documentation that supports your weight loss attempts to us.    Questions that are important to ask your insurance company when you call them:    1. Are [facility] in my network?  2. Is bariatric surgery a covered benefit under my policy?  If so, what is my estimated out of pocket expense?  3. Are there any exclusions or cap limits on my plan for bariatric surgery?  If so, what are they?  4. What are the criteria necessary to be approved for bariatric surgery?  5. Do you require medically supervised weight loss visits for approval of surgery?  If yes, for how many months?  Within the last # of years?  6. Are the following procedures a covered benefit under my plan?  a. Laparoscopic Gastric Bypass (CPT Code 72941)  b. Laparoscopic Sleeve Gastrectomy (CPT Code 45766)  c. Nutrition/Dietitian Consult (CPT Code 41214  d. Nutrition/Dietitian Reassessment (CPT Code 05578  e. Psychological Assment (CPT Codes 62842 & 03882          Keys to Success for Bariatric Surgery  Eat 3 nutrient-rich meals each day     Don't skip meals--it will cause you to overeat later in the day! Space meals 4-6 hours apart    Eat around the same times each day to develop a routine eating schedule    Avoid snacking unless physically hungry.   Planned snacks: 1-2 times per day and no more than 150 calories      Eating protein and fiber (vegetables/fruits/whole grains) with meals helps you stay full     Choose foods with less  than 10 grams of sugar and 5-10 grams of fat per serving to prevent excess calories and weight gain  Eat protein first    Protein helps with healing, maintaining muscle mass and good nutrition, and reducing hunger     For RNY Gastric Bypass, Sleeve Gastrectomy, and Duodenal Switch: aim for 60-80 grams of protein per day    Eat protein first, then veggies and the fruits or grains  Stop drinking 15-30 minutes before meals and wait 30-45 minutes after eating to drink    Drinking too soon before a meal may cause you to be too full to eat    Drinking too soon after you eat will cause the food to  wash  through your new stomach too quickly--leaving you hungry and likely to eat more    Eat [id number]    Take 20-30 minutes to eat each meal by taking small bites, chewing foods to applesauce consistency or 20-30 times before you swallow    Not chewing food properly can block the opening of your pouch--causing pain, nausea, vomiting    Eating foods too fast can delay those full signals and increase overeating   ? Slow down your eating by smaller plates/bowls, toddler utensils, putting your fork/spoon down between bites and not watching TV/computer during meals!  Make a list of activities to prevent boredom, stress and emotional eating    Go for a walk or lift weights while watching your favorite TV show    Talk to a co-worker or email/call a friend     Keep your hands and mind busy with woodworking, puzzles, knitting or painting your nails    Practice deep breathing or meditation  Drink 64 ounces of 0-Calorie drinks between meals     Water    Zero calorie Propel , Vitamin Water Zero  or So[employer] Lifewater , Crystal Light , Sugar-Free Anshul-Aid , and other sugar-free lemonade or flavored garcia    Decaffeinated, unsweetened coffee/ tea (1 cup or less per day)   Avoid Caffeine and Carbonation- NO STRAWS    Caffeine can irritate your new pouch, increase risk for ulcers, and can increase your appetite      Carbonation can lead to  increased bloating/gas and discomfort   Work up to a total of 30-60 minutes of physical activity most days of the week    Helps with losing weight and preventing weight regain after surgery     Do a combination of cardio (walking/water exercises/biking/swimming) and strength training  Take daily vitamin/mineral supplements after surgery    Daily use of vitamins/minerals is necessary for the rest of your life      Psychological Evaluation for Bariatric Surgery      Why do I need a psychological evaluation before bariatric surgery?       The psychologist's main purpose is to provide the support and education to ensure you have the most successful outcome after surgery.     Preparing for bariatric surgery often involves changing behavior patterns. Working on these changes before surgery allows you to achieve the best results after surgery as some of these behaviors have been entrenched for many years. In addition, your relationship with food may need to change. Psychologists are experts in behavior change and are there to guide and support you as you make these important changes.     A significant life change, like losing a lot of weight, may affect many areas of your life--self-concept, physical activity and relationships with others. Your psychologist will help you prepare to adjust to these changes in a healthy way.     If you have mental health symptoms, relationship struggles, or other challenges, your psychologist will suggest how to best address these concerns so that they do not interfere with your progress toward a healthier lifestyle.       Is the psychologist looking for reasons to say I can't have surgery?     The goal is to not find specific psychological problems. Instead, the psychologist will be working with your strengths to ensure you have the most optimal outcome after surgery.    There is no expectation that you will have everything figured out already or that you must have  perfect  behaviors  before moving forward with surgery. Your psychologist is expecting that you will have some behavior changes that will need to occur concurrent with the surgery.     You can feel comfortable that the psychologist is not there to    you or your habits. The psychologist is there to provide support and encourage your progress.      What should I expect during the evaluation?     During the interview, which could take place over 2 or more sessions, the psychologist will ask about:   -weight history, current eating pattern and fluid intake, and previous attempts at weight loss   -activity level   -psychiatric history  -drug, alcohol and nicotine use   -social and developmental history  -support system   -current stressors and coping mechanisms   -understanding of the risks of surgery   -expectations for outcomes after surgery     You will complete various questionnaires that will focus on coping strategies, eating behaviors, quality of life and adverse childhood experiences in order to provide additional information to inform the assessment.     Your psychologist will likely give you some  homework assignments  to practice as you prepare for surgery. This will be individually tailored to your specific needs.     Your psychologist will talk with you about some of the typical challenges that patients might encounter after surgery and how to prepare for these.     A final report will be generated and any treatment recommendations will be discussed with you and the bariatric team to determine next steps.     If you would like, you may have any support people (e.g., spouse) join a session of the evaluation to provide additional information.       Bariatric surgery offers a physical  tool  for weight management. Similarly, your work with the psychologist will provide you with some additional emotional and behavioral  tools  as you work toward your healthier lifestyle goals.      Support Group    Support and accountability  is an important part of your weight loss journey and maintenance once you reach your health goals.  Because of this, we require that you attend at least one of our support groups before you meet with the surgeon so you get a feel for what they are like and hopefully establish a connection to others who are going through a similar process or have had surgery before so you can ask your questions.    We currently have two options for support group but they are in the virtual format only at this time.  Both groups are using Microsoft Teams for their platform and you can access it through the web or an ling that can be downloaded to a smart phone if you have one.      The Pre- and Post-op Connections Group is on the second Tuesday of the month from 6:30-8pm and is hosted by Brody Tobias, PhD.  If you are interested in this group, you will need to email him at [email or web address] each month and then he will in turn send you the invitation to join.      We also have a Post-op focused Connections Group the 4th Wednesday of the month from 11am-12pm that is mostly geared toward post-operative patients who are past three months post-op.  This group is hosted by Margarita Blackwell, JMN, CBN, CIC and you can email her to join the group at [email or web address] each month and she will send you an invite similar to Brody's group.  If you decide you would like to be a regular attender at this group, we can add you to an automatic invitation list of people.    You can see more information about available support groups that the [employer] offers to our patients as well by following the link:    [email or web address]      Please let us know if you have any questions about all the information above and we will be talking more about this during future visits.     Thank you,   [facility]      LEAN  PROTEIN SOURCES  Getting 20-30 grams of protein, 3 meals daily, is appropriate for most people, some need more but more than about 40 grams per meal is not useful.  General rule is drinking one ounce of water per gram of protein eaten over the course of the day:  70 grams of protein each day, drink 70 oz of water.  Protein Source Portion Calories Grams of Protein                           Nonfat, plain Greek yogurt    (10 grams sugar or less) 3/4 cup (6 oz)  12-17   Light Yogurt (10 grams sugar or less) 3/4 cup (6 oz)  6-8   Protein Shake 1 shake 110-180 15-30   Skim/1% Milk or lactose-free milk 1 cup ( 8 oz)  8   Plain or light, flavored soymilk 1 cup  7-8   Plain or light, hemp milk 1 cup 110 6   Fat Free or 1% Cottage Cheese 1/2 cup 90 15   Part skim ricotta cheese 1/2 cup 100 14   Part skim or reduced fat cheese slices 1 ounce 65-80 8     Mozzarella String Cheese 1 80 8   Canned tuna, chicken, crab or salmon  (canned in water)  1/2 cup 100 15-20   White fish (broiled, grilled, baked) 3 ounces 100 21   [place]/Tuna (broiled, grilled, baked) 3 ounces 150-180 21   Shrimp, Scallops, Lobster, Crab 3 ounces 100 21   Pork loin, Pork Tenderloin 3 ounces 150 21   Boneless, skinless chicken /turkey breast                          (broiled, grilled, baked) 3 ounces 120 21   [place], [place], [place], and Venison 3 ounces 120 21   Lean cuts of red meat and pork (sirloin,   round, tenderloin, flank, ground 93%-96%) 3 ounces 170 21   Lean or Extra Lean Ground Turkey 1/2 cup 150 20   90-95% Lean Crowell Burger 1 veronica 140-180 21   Low-fat casserole with lean meat 3/4 cup 200 17   Luncheon Meats                                                        (turkey, lean ham, roast beef, chicken) 3 ounces 100 21   Egg (boiled, poached, scrambled) 1 Egg 60 7   Egg Substitute 1/2 cup 70 10   Nuts (limit to 1 serving per day)  3 Tbsp. 150 7   Nut Cubero (peanut, almond)  Limit to 1 serving or less daily 1 Tbsp. 90 4   Soy  Devonte (varies) 1  15   Garbanzo, Black, Huntley Beans 1/2 cup 110 7   Refried Beans 1/2 cup 100 7   Kidney and Lima beans 1/2 cup 110 7   Tempeh 3 oz 175 18   Vegan crumbles 1/2 cup 100 14   Tofu 1/2 cup 110 14   Chili (beans and extra lean beef or turkey) 1 cup 200 23   Lentil Stew/Soup 1 cup 150 12   Black Bean Soup 1 cup 175 12       Yokasta, below is an example that could be helpful this winter for some initial weight reduction. Aim for 60-80g of lean protein daily, about 20-25g per meal right now.    To help lose weight in a safe way and sustainable way, I'd like to start you on a 1300 calorie diet each day.  Understanding that every 3500 calorie deficit adds up to a pound of weight reduction, with the combination of light aerobic exercise, some modest weight training and diet, we should be able to lose around 1 to 2.5lbs weekly depending on your starting height and weight and exercise routine with this goal intake. Dropping abut 1% total body weight weekly is exceptional weight loss and very sustainable once in a good routine.    Hunger and fatigue are the enemies of weight loss and behavior change in general.  We become much more reactive in our eating when we're hungry and tired and decrease our levels of self control.  It's not a personal failing, it's just body chemistry.   We can combat this by trying to avoid being tired and hungry at the same time.  To help this,  try to front load your calories in the first 10 hours of you day so you get into the fatigued evening hours reasonably full and you can control impulses/mindless eating a lot better and avoid those bedtime snacks/evening treats that the tired brain craves.  If you can getting your exercise in the beginning of your day has also been shown to have superior results (but anytime is better than none).  We want to build up to 150 minutes or more as you progress through the first 2-3 months of weight loss season (300 minutes weekly is ideal for  maintaining weight loss for most after the end of weight loss season).     Weight loss goes through ups and downs and plateaus but if you stay on the program, you will enjoy success.   Commit to the process, try to be on track at least 19 days out of 20 and continue to think about why you're doing this and what you're working towards. If you haven't thought of your reward for hitting your weight loss goals, think about it now. Using these little victory bribes along the way helps a lot.    Finding a diet that is satisfying and repeatable-- day in and day out, improves success.  The following uses the concept of Daily Caloric Restriction, eating less every day.  An outline of how to break up the day's food is given below.  It is a starting point and example of what a 1300 calorie diet looks like.  You can modify the listed foods to suite your particular tastes, but pay attention to portions and protein content.   Do not skip breakfast on this plan as it will leave you hungry and lead to overeating at some point during the rest of the day.  If you can make supper the last food for the day more days of the week then not, it will help a lot.  That means that the intake during those first 10-12 hours of the day and hitting your protein/intake targets for all three meals is vital to your success and evening hunger control.     Start reading labels so you know that you're getting what you think you are and start measuring foods so you can eventually look at a portion and understand how it will provide the fuel you want.    Prepping raw veggies after you buy them (washing and putting into bags/tupperware for easy access), cooking several chicken breasts/proteins for the next 2-3 lunches and generally being able to grab and go what will keep you on target when time is short will greatly aid your success.  Prepare and plan ahead and success will follow. It really only requires a couple days weekly to optimize the access to  the right food at the right time of day.  Food delivery and stopping at fast food is a sign of reactive eating and usually will signal a stalling of your weight loss.    Read labels:  for protein portions/yogurt, protein bars etc looking for items with more than 10 grams of protein and less than 10 grams of sugar is very helpful.  Frozen meals should have at least 18 grams of protein, under 10 grams of sugar as well (typically around 300-380 calories).    Please note: if you've had previous bariatric surgery: wait 20-30 minutes before/after eating to drink your beverages to avoid early fullness/dumping syndrome/worsening malabsorption, early loss of fullness and hunger.  Start with eating your protein first, slow down your meals and chew thoroughly.          1300 calorie diet:  Think Big Breakfast, Medium Lunch, smaller dinner.  Note: it's OK to consolidate the calories/protein of the mid morning snack with breakfast and the midafternoon snack with lunch if time doesn't allow or if your don't wish to have those snacks. No snacks recommended after supper. If you're prone to late afternoon nibbling, that is a great time to get your fitness in so you can get to supper without the extra.    Breakfast goal of 300 calories-350 calories (egg, 1/3 to 1/2 cup cooked old fashioned oatmeal or steel cut oats and berries,3-4oz greek yogurt.)Glass of water and if you like coffee (black) or tea.        Mid morning Snack or part of lunch, about 2-2.5 hrs later: 100 calories (cottage cheese/string cheese/ fruit or banana) and a handful of cruchy/green veggies (cucumber/celery/green peppers/broccoli).  Small glass of water    Lunch:  300 calories (3.5-4 oz tuna with little tobin (no bread), apple, salad with drizzle of olive oil/balsamic vinegar for example)  Water    Snack:  100 calories.  4-5 oz Greek Yogurt with at least 17 grams of protein per serving.    Or Cottage cheese (lower sodium version preferable). Get this in about 2 hrs  before you plan to have dinner. Protein drinks with at least 15-20 grams of protein and less than 6 grams of sugar could be used here to hit protein goals and decrease afternoon/evening hunger.  Glass of water    Dinner:  350 calories (4 oz meat or fish with cooked veggies or salad with minimal dressing, one piece of bread).  Glass of water or unsweetened tea with lemon  Dessert: 100 calories Medium Apple or Small handful of nuts, about 2/3 of an ounce (almonds/walnuts/cashews or pistachios are ideal).   Glass of water.    Try to avoid all soda and juice (low sodium V8 ok once a day).   You can do it! Embrace the healthier you and give up the inflammatory sugary treats that accelerate disease.    Choose an activity that is fun/interesting and available to you such as  Going for a swim for 15 minutes, walking 40 minutes, elliptical 20 minutes or cycling 20 minutes as many days a week as you can.  Having a walking or workout buddy can make it even more enjoyable and keep you on track the days your motivation/energy may be lower.  If those times are too long for your fitness level, start at 10 minutes of movement and each week try to increase by 2 minutes each week.  The first 70 minutes a week (10 minutes a day only) of exercise drops the mortality rate of a sedentary person 30%!!!!  Our goal is to work up to 150 minutes or more per week of moderate to vigorous exercise  to optimize metabolism and prevent weight regain during maintenance. If time is short and your fitness allows it, high intensity interval training can be a nice way to cut the workout time in half:  warm up 2-4 minutes then 3-6 intervals of increased intensity effort (70% of max heart rate) followed by an equal amount or more of recovery before repeating, then 1-3 minutes of cooldown.     10 minutes of weight lifting can be helpful as well or using some body weight exercises like wall squats, pushups (with assistance as needed or standing/wall  "pushups), seat presses, yoga moves. At least twice weekly helps maintain a good strength to weight ratio, more days is better.  [employer] is a great resource for free video demonstrations.    If you are into strenuous weight lifting or prolonged exercise, use an online calculator for how many calories you've burned and if your exercise is lasting over 60 minutes, replace 20-30% of those calories burned immediately after exercise .  For the more limited exercise (less than 500 calories burned), there is no need to add extra food unless you notice a lot of hunger on your food journal.  Usually  Even a  calorie load after longer workouts is more than adequate.  For longer efforts, hunger will increase if you don't refuel afterwards and can get meal plan off track due to hunger, so replenish immediately after the workout to keep on the diet plan and feeling good.    Exercise example:  If you burned 1000 calories during the exercise, immediately (within 30 minutes) have a snack/replacement beverage totaling 200-300 calories and ideally have a 3:1 ratio of carbohydrate grams to protein grams to keep muscles ready for exercise the next day.  Have your next, full meal within 2-3 hours of exercise.    Tips for success:  KEEP A FOOD JOURNAL and a log of daily weights.  Pencil and paper works fine for most. Otherwise, [employer], fitHopela, [employer], [employer], [employer] are all good tracker apps/programs or websites for food/fitness.  Remembering to ask yourself, \"How did my nourishment affect me today\" and comparing \"good days\" to \"hungry days\" to solidify what helps your body, in your life, feel it's best while losing weight.    1.  Prepare proteins ahead of time (broil chicken breasts in bulk so you can grab and go), steel cut oats can be stored in casserole dish/bowl in the fridge for quick scoop in the morning and rewarm in microwave, make use of crock pot recipes (watch salt content).    2.  Drink a 8-12 oz glass of water " "every 2-3 hours when awake.  We often mistake hunger for thirst, especially when losing weight.    3. Remember your Reward and Motivation when things get hard.    4.  Weigh yourself every morning and record, you'll stay on track better and learn how your body loses weight. Don't worry about 1 or 2 day patterns, but when on track you'll notice good trend downward of weight over 3-4 day segments.    5. Call or use [employer] messaging if problems/concerns .    6.  Find a handful of meals/foods that keep you on track and get into a boring routine that is sustainable for you.    7.  Take a complete multivitamin just to make sure all micronutrients are adequate during weight loss.    8. If losing hair/brittle nails it often means you are not taking enough protein.  Minimum goal is 60 grams daily of protein, most people with normal kidneys do well with upwards of 100-120 grams/day of protein. Consider taking Biotin as supplement or a \"Hair and Nail\" multivitamin.  If you are hypothyroid and losing hair, see you doctor for a check up of your levels if you haven't had one recently.    9.  Getting adequate sleep is very important for starting your day properly, when we are sleep deprived, our morning appetite is suppressed and without eating an adequate breakfast, we overeat later in the day when we're tired.  Our body heals in our sleep and our mental and immune health depends on this rest.  Aim for 7-8 hours of sleep nightly if possible.  If you sleep is disturbed, perform some introspection on stressors/depression/anxiety/PTSD events or possible sleep disorders and we can trouble shoot solutions/evaulations if issues persist.    Exercise during the day, meditative breathing before bed and after waking and removing the television from the bedroom are easy ways to improve quality of sleep.      10. Relaxation.  Controlled breathing exercises can lower stress levels in the brain.  One technique is 4, 7, 8 breathing:  Place the " tip of your tongue behind your front teeth, breath in through your mouth for 4 seconds, Hold it for 7 seconds and breath out slowly, making a leaky tire noise for 8 seconds.  Repeat 4 times.  Ideally do at the start and end of your day or if feeling stressed.  It works and it's why meditation/yoga/martial arts are often very breath based activities.  There are breathing techniques for alertness as well as relaxation out there and they can be quite helpful.    Bariatric Task List    Fax:  Please fax all paperwork to: [phone] -     Status:  Is patient a candidate for bariatric surgery?:  patient is a candidate for bariatric surgery -     Cleared to schedule surgeon consult?:  not cleared to schedule surgeon consult -     Status:  surgery evaluation in process -     Surgeon: Kristy or Lucien -        Insurance: Insurance:  [facility] -     Other:  Call Jessica at [phone] to discuss insurance requirements. -        Patient Info: Initial Weight:  280 -     Date of Initial Weight/Height:  1/26/2021 - reported   Required Weight Loss:  To be determined at in-person visit. -     Surgery Type:  gastric bypass -        Dietician Visits: Structured weight loss required by insurance?:  structured weight loss required - 3 months consecutive w/ and dietitian   Dietician Visit 1:  Completed - 1/26/2022-   Dietician Visit 2:  Needed -     Dietician Visit 3:  Needed -     Clearance from dietician to see surgeon?:  Needed - Ama      Psychological Evaluation: Psych eval:  Needed - Brody      Lab Work: Complete Blood Count:  Needed -     Comprehensive Metabolic Panel:  Needed -     Vitamin D:  Needed -     PTH:  Needed -     Hgb A1c:  Needed -      Lipids: Needed - fasting    TSH (UCARE, SCA, [place] MA): Needed -       Ferritin: Needed -       Folate: Needed -     Thiamine: Needed -     Vitamin A: Needed -     Vitamin B12: Needed -     Zinc: Needed -     Other:  Needed - cortisol and prolactin      Stopping  "Smoking/ Alcohol Use: Quit tobacco use (3 months smoke free)?:  Completed - quit 2009   Quit alcohol use: Needed - cut back to no more than once weekly or less   Other: Wean off caffeine the month prior to surgery. -        Patient Education:  Information Session:  Completed - 12/7/2021 online   Given \"Making your decision\" handout?:  Yes -     Given \"A Roadmap to you Weight Loss Surgery\" handout?: Yes -     Given support group information?:    - send email to [email or web address] to request invite to next meeting.     Attended support group?:  Needed - Must attend at least once!!   Support plan in place?:  Completed - , family and friends.      Additional Surgery Requirements: Review Coag plan:  Completed - low risk   Birth control plan:  Completed - tubal and IUD (for bleeding control)   Gallstone prevention plan (Actigall for 6 months postop): Needed -        Final Tasks:  Before surgery online class:  Needed - Prior to surgery w/dietitian and nurse   After surgery online class:  Needed - 1 week after surgery w/dietitian   Nurse visit per clinic:  Needed - In-person with .   History and Physical per clinicNeeded - within 30 days of surgery:   -     Final labs per clinic: Needed - within 30 days of surgery   Chest xray per clinic:  Needed - within 90 days of surgery -     Electrocardiogram (ECG) per clinic:Needed - within 90 days of surgery   -        Notes: Exam visit with me as intro was done through video. -            "

## 2022-02-04 ENCOUNTER — LAB (OUTPATIENT)
Dept: LAB | Facility: CLINIC | Age: 37
End: 2022-02-04
Payer: COMMERCIAL

## 2022-02-04 DIAGNOSIS — E66.01 OBESITY, CLASS III, BMI 40-49.9 (MORBID OBESITY) (H): ICD-10-CM

## 2022-02-04 DIAGNOSIS — R53.83 FATIGUE, UNSPECIFIED TYPE: ICD-10-CM

## 2022-02-04 LAB
ALBUMIN SERPL-MCNC: 3.9 G/DL (ref 3.5–5)
ALP SERPL-CCNC: 69 U/L (ref 45–120)
ALT SERPL W P-5'-P-CCNC: 38 U/L (ref 0–45)
ANION GAP SERPL CALCULATED.3IONS-SCNC: 10 MMOL/L (ref 5–18)
AST SERPL W P-5'-P-CCNC: 26 U/L (ref 0–40)
BILIRUB SERPL-MCNC: 1.2 MG/DL (ref 0–1)
BUN SERPL-MCNC: 8 MG/DL (ref 8–22)
CALCIUM SERPL-MCNC: 9.6 MG/DL (ref 8.5–10.5)
CHLORIDE BLD-SCNC: 103 MMOL/L (ref 98–107)
CHOLEST SERPL-MCNC: 176 MG/DL
CO2 SERPL-SCNC: 25 MMOL/L (ref 22–31)
CORTIS SERPL-MCNC: 8.8 UG/DL
CREAT SERPL-MCNC: 0.75 MG/DL (ref 0.6–1.1)
ERYTHROCYTE [DISTWIDTH] IN BLOOD BY AUTOMATED COUNT: 11.7 % (ref 10–15)
FASTING STATUS PATIENT QL REPORTED: ABNORMAL
FERRITIN SERPL-MCNC: 97 NG/ML (ref 10–130)
FOLATE SERPL-MCNC: 11.2 NG/ML
GFR SERPL CREATININE-BSD FRML MDRD: >90 ML/MIN/1.73M2
GLUCOSE BLD-MCNC: 130 MG/DL (ref 70–125)
HBA1C MFR BLD: 6.8 % (ref 0–5.6)
HCT VFR BLD AUTO: 43.2 % (ref 35–47)
HDLC SERPL-MCNC: 36 MG/DL
HGB BLD-MCNC: 15 G/DL (ref 11.7–15.7)
LDLC SERPL CALC-MCNC: 118 MG/DL
MCH RBC QN AUTO: 28.6 PG (ref 26.5–33)
MCHC RBC AUTO-ENTMCNC: 34.7 G/DL (ref 31.5–36.5)
MCV RBC AUTO: 82 FL (ref 78–100)
PLATELET # BLD AUTO: 218 10E3/UL (ref 150–450)
POTASSIUM BLD-SCNC: 3.9 MMOL/L (ref 3.5–5)
PROLACTIN SERPL-MCNC: 13.8 NG/ML (ref 0–20)
PROT SERPL-MCNC: 6.8 G/DL (ref 6–8)
PTH-INTACT SERPL-MCNC: 32 PG/ML (ref 10–86)
RBC # BLD AUTO: 5.24 10E6/UL (ref 3.8–5.2)
SODIUM SERPL-SCNC: 138 MMOL/L (ref 136–145)
T3FREE SERPL-MCNC: 3.2 PG/ML (ref 1.6–3.9)
T4 FREE SERPL-MCNC: 0.96 NG/DL (ref 0.7–1.8)
TRIGL SERPL-MCNC: 112 MG/DL
TSH SERPL DL<=0.005 MIU/L-ACNC: 1.23 UIU/ML (ref 0.3–5)
WBC # BLD AUTO: 7.2 10E3/UL (ref 4–11)

## 2022-02-04 PROCEDURE — 99000 SPECIMEN HANDLING OFFICE-LAB: CPT

## 2022-02-04 PROCEDURE — 82306 VITAMIN D 25 HYDROXY: CPT

## 2022-02-04 PROCEDURE — 82607 VITAMIN B-12: CPT

## 2022-02-04 PROCEDURE — 84439 ASSAY OF FREE THYROXINE: CPT

## 2022-02-04 PROCEDURE — 83036 HEMOGLOBIN GLYCOSYLATED A1C: CPT

## 2022-02-04 PROCEDURE — 84425 ASSAY OF VITAMIN B-1: CPT | Mod: 90

## 2022-02-04 PROCEDURE — 82746 ASSAY OF FOLIC ACID SERUM: CPT

## 2022-02-04 PROCEDURE — 82728 ASSAY OF FERRITIN: CPT

## 2022-02-04 PROCEDURE — 83970 ASSAY OF PARATHORMONE: CPT

## 2022-02-04 PROCEDURE — 84443 ASSAY THYROID STIM HORMONE: CPT

## 2022-02-04 PROCEDURE — 84481 FREE ASSAY (FT-3): CPT

## 2022-02-04 PROCEDURE — 84630 ASSAY OF ZINC: CPT | Mod: 90

## 2022-02-04 PROCEDURE — 80053 COMPREHEN METABOLIC PANEL: CPT

## 2022-02-04 PROCEDURE — 84146 ASSAY OF PROLACTIN: CPT

## 2022-02-04 PROCEDURE — 85027 COMPLETE CBC AUTOMATED: CPT

## 2022-02-04 PROCEDURE — 80061 LIPID PANEL: CPT

## 2022-02-04 PROCEDURE — 82533 TOTAL CORTISOL: CPT

## 2022-02-04 PROCEDURE — 84590 ASSAY OF VITAMIN A: CPT | Mod: 90

## 2022-02-04 PROCEDURE — 36415 COLL VENOUS BLD VENIPUNCTURE: CPT

## 2022-02-05 LAB — VIT B12 SERPL-MCNC: 330 PG/ML (ref 213–816)

## 2022-02-07 LAB — DEPRECATED CALCIDIOL+CALCIFEROL SERPL-MC: 39 UG/L (ref 30–80)

## 2022-02-08 LAB
ANNOTATION COMMENT IMP: NORMAL
RETINYL PALMITATE SERPL-MCNC: <0.02 MG/L
VIT A SERPL-MCNC: 0.48 MG/L
ZINC SERPL-MCNC: 86 UG/DL

## 2022-02-09 DIAGNOSIS — E11.9 TYPE 2 DIABETES MELLITUS WITHOUT COMPLICATION, WITHOUT LONG-TERM CURRENT USE OF INSULIN (H): Primary | ICD-10-CM

## 2022-02-09 LAB — VIT B1 PYROPHOSHATE BLD-SCNC: 163 NMOL/L

## 2022-02-09 RX ORDER — MAGNESIUM 200 MG
TABLET ORAL
Qty: 150 TABLET | Refills: 1 | Status: SHIPPED | OUTPATIENT
Start: 2022-02-09

## 2022-02-09 NOTE — TELEPHONE ENCOUNTER
Reviewed intake labs with Ms. Gary today which  demonstarted type II diabetes w/ A1c of 6.8%, glucose 130mg/dl. Will start metformin therapy and B12 supplementation given low end of range B12 at 330 and start of metformin which could deplete her some as she progresses through bariatric surgery preparation phase the next 6 months.     Lipids are reasonable and given plan for bariatric surgery which should put her diabetes into remission, will hold off on statin therapy. Will continue working with dietician preop on complementary dietary changes for glycemic control and preop weight loss.   North Bray MD

## 2022-02-10 DIAGNOSIS — E11.9 TYPE 2 DIABETES MELLITUS WITHOUT COMPLICATION, WITHOUT LONG-TERM CURRENT USE OF INSULIN (H): ICD-10-CM

## 2022-02-11 DIAGNOSIS — E11.9 TYPE 2 DIABETES MELLITUS WITHOUT COMPLICATION, WITHOUT LONG-TERM CURRENT USE OF INSULIN (H): ICD-10-CM

## 2022-02-17 ENCOUNTER — VIRTUAL VISIT (OUTPATIENT)
Dept: SURGERY | Facility: CLINIC | Age: 37
End: 2022-02-17
Payer: COMMERCIAL

## 2022-02-17 DIAGNOSIS — E66.01 MORBID OBESITY (H): Primary | ICD-10-CM

## 2022-02-17 NOTE — PROGRESS NOTES
Video-Visit Details    Type of service:  Video Visit    Video Start Time (time video started): 1:20 PM    Video End Time (time video stopped): 2:05 PM    Originating Location (pt. Location): Home    Distant Location (provider location):  Home office    Mode of Communication:  Video Conference via [email or web address]    Yokasta would like to follow through with bariatric surgery for health reasons. She has struggled with her weight for much of her life and now is concerned about various comorbidities. She has a history of boredom eating. Her  also recently went through surgery. She has good knowledge of surgery and good support. She will follow up and complete psychological testing. F50.9; E66.01    Physician has received verbal consent for a Video Visit from the patient? Yes      Brody Tobias, PhD

## 2022-02-17 NOTE — LETTER
2/17/2022         RE: Yokasta Gary  [address]   [place] [address]        Dear Colleague,    Thank you for referring your patient, Yokasta Gary, to the [facility]ARE [place]. Please see a copy of my visit note below.    Video-Visit Details    Type of service:  Video Visit    Video Start Time (time video started): 1:20 PM    Video End Time (time video stopped): 2:05 PM    Originating Location (pt. Location): Home    Distant Location (provider location):  Home office    Mode of Communication:  Video Conference via [email or web address]    Yokasta would like to follow through with bariatric surgery for health reasons. She has struggled with her weight for much of her life and now is concerned about various comorbidities. She has a history of boredom eating. Her  also recently went through surgery. She has good knowledge of surgery and good support. She will follow up and complete psychological testing. F50.9; E66.01    Physician has received verbal consent for a Video Visit from the patient? Yes      Brody Tobias, PhD            Again, thank you for allowing me to participate in the care of your patient.        Sincerely,        Brody Tobias, PhD

## 2022-02-24 ENCOUNTER — DOCUMENTATION ONLY (OUTPATIENT)
Dept: SURGERY | Facility: CLINIC | Age: 37
End: 2022-02-24
Payer: COMMERCIAL

## 2022-02-24 NOTE — PROGRESS NOTES
Tasklist updated.    Dennise Sweet RN, CBN  [facility]  P [phone]  F [phone]      Bariatric Task List    Fax:  Please fax all paperwork to: [phone] -     Status:  Is patient a candidate for bariatric surgery?:  patient is a candidate for bariatric surgery -     Cleared to schedule surgeon consult?:  not cleared to schedule surgeon consult -     Status:  surgery evaluation in process -     Surgeon: Kristy or Lucien -        Insurance: Insurance:  [employer] [place] -     Other:  Call Jessica at [phone] to discuss insurance requirements. -        Patient Info: Initial Weight:  280 -     Date of Initial Weight/Height:  1/26/2021 - reported   Required Weight Loss:  To be determined at in-person visit in March -     Surgery Type:  gastric bypass -        Dietician Visits: Structured weight loss required by insurance?:  structured weight loss required - 6 months consecutive w/ and dietitian   Dietician Visit 1:  Completed - 1/26/2022-   Dietician Visit 2:  Needed - Feb-Ama   Dietician Visit 3:  Needed - March-   Dietician Visit 4:  Needed - April   Dietician Visit 5:  Needed - May   Dietician Visit 6:  Needed - June   Clearance from dietician to see surgeon?:  Needed - Ama      Psychological Evaluation: Psych eval:  Needed - Brody      Lab Work: Complete Blood Count:  Completed -     Comprehensive Metabolic Panel:  Completed -     Vitamin D:  Completed -     PTH:  Completed -     Hgb A1c:  Completed - 2/4/2022  6.8    Lipids: Completed - fasting    TSH ([facility], SCA, [place] MA): Completed -       Ferritin: Completed -       Folate: Completed -     Thiamine: Completed -     Vitamin A: Completed -     Vitamin B12: Completed -     Zinc: Completed -     Other:  Completed - cortisol and prolactin      Stopping Smoking/ Alcohol Use: Quit tobacco use (3 months smoke free)?:  Completed - quit 2009   Quit alcohol use: Needed - cut back to no more than  "once weekly or less   Quit date:   -     Other: Wean off caffeine the month prior to surgery. -     Quit date:   -        Patient Education:  Information Session:  Completed - 12/7/2021 online   Given \"Making your decision\" handout?:  Yes -     Given \"A Roadmap to you Weight Loss Surgery\" handout?: Yes -     Attended support group?:  Needed - Must attend at least once!!   Support plan in place?:  Completed - , family and friends.      Additional Surgery Requirements: Review Coag plan:  Completed - low risk   HgA1c <8:  Needed -     Birth control plan:  Completed - tubal and IUD (for bleeding control)   Gallstone prevention plan (Actigall for 6 months postop): Needed -        Final Tasks:  Before surgery online class:  Needed - Prior to surgery w/dietitian and nurse   After surgery online class:  Needed - 1 week after surgery w/dietitian   Nurse visit per clinic:  Needed - In-person with .   History and Physical per clinic: Needed - within 30 days of surgery  -     Final labs per clinic: Needed - within 30 days of surgery   Chest xray per clinic: Needed - within 90 days of surgery  -     Electrocardiogram (ECG) per clinic: Needed - within 90 days of surgery  -        Notes: Exam visit with me as intro was done through video. -            "

## 2022-02-24 NOTE — PROGRESS NOTES
I spoke with Yokasta today about moving forward with surgery and her insurance requirements.  She is pretty familiar since her  had surgery last August.  She has changed to his insurance with [employer] and will need 6 months of SWL, be cleared by Brody and complete her task list.  She is emailing Brody today to attend the support group on March 8.      NOTE: Brody I don't know if you remember but her  was the gentleman that his insurance said you needed to change the wording of your clearance to I RECOMMEND HE HAVE SURGERY-  This was the case that almost broke me!  LOL  So if you can add that statement into Yokasta's once she's cleared and I don't have to spend weeks fighting with them it would be so awesome!

## 2022-02-28 ENCOUNTER — VIRTUAL VISIT (OUTPATIENT)
Dept: SURGERY | Facility: CLINIC | Age: 37
End: 2022-02-28
Payer: COMMERCIAL

## 2022-02-28 VITALS — WEIGHT: 280 LBS | HEIGHT: 63 IN | BODY MASS INDEX: 49.61 KG/M2

## 2022-02-28 DIAGNOSIS — E66.01 OBESITY, CLASS III, BMI 40-49.9 (MORBID OBESITY) (H): ICD-10-CM

## 2022-02-28 DIAGNOSIS — E11.9 TYPE 2 DIABETES MELLITUS WITHOUT COMPLICATION, WITHOUT LONG-TERM CURRENT USE OF INSULIN (H): Primary | ICD-10-CM

## 2022-02-28 PROCEDURE — 97802 MEDICAL NUTRITION INDIV IN: CPT | Performed by: DIETITIAN, REGISTERED

## 2022-02-28 NOTE — PROGRESS NOTES
Yokasta Gary is a 36 year old who is being evaluated via a billablevideo visit.      How would you like to obtain your AVS? MyChart  If the video visit is dropped, the invitation should be resent by: Send to e-mail at: [email or web address]  Will anyone else be joining your video visit? No      Video Start Time: 12:29 PM      Initial Structured Weight Loss Supervised Diet Evaluation     Assessment:  Yokasta is being seen today for initial RD nutritional evaluation. Patient has been unsuccessful with non-surgical weight loss methods and is interested in bariatric surgery. Today we reviewed current eating habits and level of physical activity, and instructed on the changes that are required for successful bariatric outcomes.    Surgery of interest per pt: RNY.    Workflow review:  Support Group: Not completed.  Psychology:In progress.  Lab work:Completed.  SWL:Yes 2/6  States she has struggled with weight her whole life- noticing weight is catching up with her- trouble keeping up with her kids  + just had surgery in August 2021- eating has changed quite     Weight goal: At or below initial.    Anthropometrics:  Pt's weight is 280 lbs 0 oz    BMI: Body mass index is 49.6 kg/m .   Ideal body weight: 52.4 kg (115 lb 8.3 oz)  Adjusted ideal body weight: 82.2 kg (181 lb 5 oz)  Estimated RMR ([place]-St Jeor equation):  1927 kcals x 1.2 (sedentary) = 2312 kcals (for weight maintenance)    Medical History:  Patient Active Problem List   Diagnosis     Pregnancy     Insulin controlled gestational diabetes mellitus (GDM) in second trimester     Morbid obesity (H)      Diabetes: new diagnosis of diabetes- taking metformin- not testing blood glucose  HbA1c:  No results found for: HGBA1C  Biggest struggle with weight loss: Convenience eating, sweet tooth, grew up with big portions and second helpings    Nutrition History  Food allergies/intolerances/cultural or religous food customs: No   Diet history: weight watchers, low  carb, exercise  Vitamins/Mineral currently taking: MVI, vitamin D, B12    Socioeconomic Status  Who does the grocery shopping for your household? self  Who prepares your meals at home? self    Diet Recall/Time  Breakfast: greek yogurt with peanut butter  Lunch: pasta salad (protein pasta) on top of salad  Dinner: chicken and rice bake with broccoli    Typical Snacks: none today- might have something at home- apple, triscuits with cheese    Overnight eating: No    Eating out: average 3 times per week- mostly on weekends    Beverages  Coffee, water- 64+oz and one diet Dr. Pepper per day    Exercise  Walking the dog, has a peloton 1-2 times per week    Nutrition Diagnosis (PES statement)  (NI-1.3) Excessive energy intake related to Food and nutrition related knowledge deficit concerning excessive energy/oral intake as evidenced by Intake of high caloric density foods/beverages (juice, soda, alcohol) at meals and/or snacks; large portions; frequent grazing; Estimated intake that exceeds estimated daily energy intake; Binge eating patterns; Frequent excessive fast food or restaurant intake; and BMI 49.6     Intervention    Nutrition Education:   1. Provided general overview of diet and lifestyle modifications needed to be a deemed a safe candidate for bariatric surgery.   2. Educated patient on how to read a food label: choosing foods with than 10 grams fat and 10 grams sugar per serving to avoid dumping syndrome.     Food/Nutrient Delivery:  3. Educated patient on eating three meals, with cutting out snacking.  4. Educated patient on using a protein powder drink as a meal replacement and/or supplement after bariatric surgery.   5. Discussed importance of adequate hydration after surgery, with goal of at least 64 oz of fluids/day.  6. Addressed avoiding all carbonated, caffeinated and sweetened drinks to prepare for bariatric surgery.     Nutrition Counselin. Mindful eating techniques: Encouraged slow meal pace,  chewing foods to applesauce consistency for 20-30 minutes/meal.   8. Discussed  fluids 30 minutes before, during, and after meal to prevent dumping syndrome and discomfort post bariatric surgery.     Instructions/Goals:     1. Start implementing bariatric surgery lifestyle modifications.  2. Find a protein shake she enjoys  3. Cut back on coffee  4. Focus on protein - 20-30g     Handouts Provided:   [facility] Bariatric Surgery Nutrition Info  Protein supplement list    Monitor/Evaluation:  Pt. s target weight: no gain from initial visit, patient verbalized understanding.     Plan for next visit:   Bariatric plate. Give food journal homework.      Video-Visit Details    Type of service:  Video Visit    Video End Time:12:58 PM    Originating Location (pt. Location): Home    Distant Location (provider location):  [facility] CL[facility]ATRICS CARE [place]     Platform used for Video Visit: Landry PHAN RD

## 2022-02-28 NOTE — LETTER
2/28/2022         RE: Yokasta Gary  [address] [place] [facility] [address]        Dear Colleague,    Thank you for referring your patient, Yokasta Gary, to the [facility]ATR[facility] [place]. Please see a copy of my visit note below.    Yokasta Gary is a 36 year old who is being evaluated via a billablevideo visit.      How would you like to obtain your AVS? MyChart  If the video visit is dropped, the invitation should be resent by: Send to e-mail at: [email or web address]  Will anyone else be joining your video visit? No      Video Start Time: 12:29 PM      Initial Structured Weight Loss Supervised Diet Evaluation     Assessment:  Yokasta is being seen today for initial RD nutritional evaluation. Patient has been unsuccessful with non-surgical weight loss methods and is interested in bariatric surgery. Today we reviewed current eating habits and level of physical activity, and instructed on the changes that are required for successful bariatric outcomes.    Surgery of interest per pt: RNY.    Workflow review:  Support Group: Not completed.  Psychology:In progress.  Lab work:Completed.  SWL:Yes 2/6  States she has struggled with weight her whole life- noticing weight is catching up with her- trouble keeping up with her kids  + just had surgery in August 2021- eating has changed quite     Weight goal: At or below initial.    Anthropometrics:  Pt's weight is 280 lbs 0 oz    BMI: Body mass index is 49.6 kg/m .   Ideal body weight: 52.4 kg (115 lb 8.3 oz)  Adjusted ideal body weight: 82.2 kg (181 lb 5 oz)  Estimated RMR ([place]-St Jeor equation):  1927 kcals x 1.2 (sedentary) = 2312 kcals (for weight maintenance)    Medical History:  Patient Active Problem List   Diagnosis     Pregnancy     Insulin controlled gestational diabetes mellitus (GDM) in second trimester     Morbid obesity (H)      Diabetes: new diagnosis of diabetes- taking metformin- not testing blood  glucose  HbA1c:  No results found for: HGBA1C  Biggest struggle with weight loss: Convenience eating, sweet tooth, grew up with big portions and second helpings    Nutrition History  Food allergies/intolerances/cultural or religous food customs: No   Diet history: weight watchers, low carb, exercise  Vitamins/Mineral currently taking: MVI, vitamin D, B12    Socioeconomic Status  Who does the grocery shopping for your household? self  Who prepares your meals at home? self    Diet Recall/Time  Breakfast: greek yogurt with peanut butter  Lunch: pasta salad (protein pasta) on top of salad  Dinner: chicken and rice bake with broccoli    Typical Snacks: none today- might have something at home- apple, triscuits with cheese    Overnight eating: No    Eating out: average 3 times per week- mostly on weekends    Beverages  Coffee, water- 64+oz and one diet Dr. Pepper per day    Exercise  Walking the dog, has a peloton 1-2 times per week    Nutrition Diagnosis (PES statement)  (NI-1.3) Excessive energy intake related to Food and nutrition related knowledge deficit concerning excessive energy/oral intake as evidenced by Intake of high caloric density foods/beverages (juice, soda, alcohol) at meals and/or snacks; large portions; frequent grazing; Estimated intake that exceeds estimated daily energy intake; Binge eating patterns; Frequent excessive fast food or restaurant intake; and BMI 49.6     Intervention    Nutrition Education:   1. Provided general overview of diet and lifestyle modifications needed to be a deemed a safe candidate for bariatric surgery.   2. Educated patient on how to read a food label: choosing foods with than 10 grams fat and 10 grams sugar per serving to avoid dumping syndrome.     Food/Nutrient Delivery:  3. Educated patient on eating three meals, with cutting out snacking.  4. Educated patient on using a protein powder drink as a meal replacement and/or supplement after bariatric surgery.    5. Discussed importance of adequate hydration after surgery, with goal of at least 64 oz of fluids/day.  6. Addressed avoiding all carbonated, caffeinated and sweetened drinks to prepare for bariatric surgery.     Nutrition Counselin. Mindful eating techniques: Encouraged slow meal pace, chewing foods to applesauce consistency for 20-30 minutes/meal.   8. Discussed  fluids 30 minutes before, during, and after meal to prevent dumping syndrome and discomfort post bariatric surgery.     Instructions/Goals:     1. Start implementing bariatric surgery lifestyle modifications.  2. Find a protein shake she enjoys  3. Cut back on coffee  4. Focus on protein - 20-30g     Handouts Provided:   [facility] Bariatric Surgery Nutrition Info  Protein supplement list    Monitor/Evaluation:  Pt. s target weight: no gain from initial visit, patient verbalized understanding.     Plan for next visit:   Bariatric plate. Give food journal homework.      Video-Visit Details    Type of service:  Video Visit    Video End Time:12:58 PM    Originating Location (pt. Location): Home    Distant Location (provider location):  [facility] SURGERY CLINIC AND BARIATRICS CARE [place]     Platform used for Video Visit: Landry PHAN RD        Again, thank you for allowing me to participate in the care of your patient.        Sincerely,        ONEIDA PHAN RD

## 2022-03-03 ENCOUNTER — TRANSFERRED RECORDS (OUTPATIENT)
Dept: HEALTH INFORMATION MANAGEMENT | Facility: CLINIC | Age: 37
End: 2022-03-03

## 2022-03-03 ENCOUNTER — VIRTUAL VISIT (OUTPATIENT)
Dept: SURGERY | Facility: CLINIC | Age: 37
End: 2022-03-03
Payer: COMMERCIAL

## 2022-03-03 DIAGNOSIS — E66.01 MORBID OBESITY (H): Primary | ICD-10-CM

## 2022-03-03 NOTE — PROGRESS NOTES
Video-Visit Details    Type of service:  Video Visit    Video Start Time (time video started): 11 AM    Video End Time (time video stopped): 11:38 AM    Originating Location (pt. Location): Home    Distant Location (provider location):  Home office    Mode of Communication:  Video Conference via [employer]    Physician has received verbal consent for a Video Visit from the patient? Yes    Yokasta has made quality changes in eating and lifestyle and appears more mindful about her eating. She is now ready to proceed with surgery. A report will be sent to the clinic. F50.9; E66.01    Brody Tobias, PhD

## 2022-03-18 ENCOUNTER — OFFICE VISIT (OUTPATIENT)
Dept: SURGERY | Facility: CLINIC | Age: 37
End: 2022-03-18
Payer: COMMERCIAL

## 2022-03-18 VITALS
BODY MASS INDEX: 49.52 KG/M2 | HEIGHT: 63 IN | WEIGHT: 279.5 LBS | SYSTOLIC BLOOD PRESSURE: 138 MMHG | DIASTOLIC BLOOD PRESSURE: 86 MMHG

## 2022-03-18 DIAGNOSIS — E11.9 TYPE 2 DIABETES MELLITUS WITHOUT COMPLICATION, WITHOUT LONG-TERM CURRENT USE OF INSULIN (H): Primary | ICD-10-CM

## 2022-03-18 DIAGNOSIS — E66.01 OBESITY, CLASS III, BMI 40-49.9 (MORBID OBESITY) (H): ICD-10-CM

## 2022-03-18 PROCEDURE — 99214 OFFICE O/P EST MOD 30 MIN: CPT | Performed by: EMERGENCY MEDICINE

## 2022-03-18 NOTE — PROGRESS NOTES
Outpatient Bariatric Medicine Progress Note-Structured Weight Loss   Indication: Medical bariatric therapy to precede bariatric surgery    Surgery:  Lupe en Y Gastric Bypass    Surgeon: UMBERTO Bonner     36 year old female with Body mass index is 49.51 kg/m . in the setting of morbid obesity which satisfies the NIH criteria for bariatric surgery. She is progressing through the program well. She's started her metformin and is tolerating it reasonably well for new onset type II diabetes (A1c of 6.8%). exercise focus has been good lately and anticipate progression to surgery in late spring if all continues on current trajectory..      Comorbidities:  Patient Active Problem List   Diagnosis     Pregnancy     Insulin controlled gestational diabetes mellitus (GDM) in second trimester     Morbid obesity (H)       Plan   Weight will need to be 280 lb prior to the 2 week pre-operative diet.  Heparin Protocol: standard  CPAP: none  Actigall: will be used for 6months post operatively.  Exercise Plan: walking/Peloton work 3x weekly w/ mix of weight training.   Contraception Plan: tubal and mirena  Agrees to take vitamins for Life: yes, reviewed labs again today from 22.  Agrees to follow up for life: yes  Medication Management: will stop metformin day prior to surgery.  .  Past Surgical History        Past Surgical History:   Procedure Laterality Date     ANTERIOR CRUCIATE LIGAMENT REPAIR Left       SECTION N/A 10/17/2014    Procedure:  SECTION;  Surgeon: Maureen Chanel MD;  Location: [facility];  Service:       SECTION N/A 9/10/2018    Procedure:  SECTION, REPEAT;  Surgeon: Dorota Guzman MD;  Location: [facility];  Service:        Family History, Social History   Reviewed as documented. See time and date confirmation.    Interim History/Pre-op needs list    Initial Labs Complete and Reviewed: yes  Dietitian Visits Initiated/cleared: started  Weight Change since  "initial weight: in line  Psychologist visits initiated/cleared: cleared 3/3/22  HCM UTD:    Pap: will update   Mammogram: n/a   Colonscopy:  n/a   Other: NA  Sugars Well Controlled: yes, A1c under 7%  Cardiac: no issues  Pulmonary: normal  Hormone use: none.  Birth control tubal/mirena to help w/ menorrhagia. .  Other: n/a.  Medications and Allergies      Current Outpatient Medications   Medication Sig Dispense Refill     Cyanocobalamin (B-12) 1000 MCG SUBL Take 3 days weekly 150 tablet 1     levonorgestrel (MIRENA, 52 MG,) 20 MCG/24HR IUD 1 each by Intrauterine route once       metFORMIN (GLUCOPHAGE) 500 MG tablet Start one tablet with supper for 2-3 weeks then increase if tolerating it to one tablet, twice daily with breakfast and supper. 180 tablet 0     Allergies: Patient has no known allergies.    Habits   Tobacco Use: none.  Alcohol Use: no issues  NSAIDS: irregularly.   Caffeine: ongoing. Will taper slowly.  SLEEP: n/a  CPAP: none.  PHYSICAL ACTIVITY PATTERNS:  Cardiovascular: see above  Strength Training: see above  Dietary History   Reviewed proper bariatric method again today.  The patient has been working with our bariatric dieticians and has started on progress..    As far as distracted eating/grazing, getting 3 meals daily, avoiding empty calories and optimizing their protein intake, making changes..    Review of Systems     Physical Exam   Vitals: /86 (BP Location: Right arm, Patient Position: Sitting, Cuff Size: Adult Small)   Ht 1.6 m (5' 3\")   Wt 126.8 kg (279 lb 8 oz)   BMI 49.51 kg/m    Body mass index is 49.51 kg/m .  GENERAL: appears her stated age. Ambulation is independent.  NECK:increase crease to back of neck without pigmentation.  HEART: Rhythm regular, rate regular, no murmur.  LUNGS: Clear without wheezes.  ABDOMEN: soft, non-tender, obese,no rashes, surgical scars not evident but had c/s  MUSCULOSKELETAL: no obvious deformities  VASCULAR: palpable peripheral pulses, no  lower " extremity edema, no color changes of venous stasis, no ulcerations  SKIN: Rashes: none.    Counseling     We discussed the National Weight Control Registry and Healthy Weight Maintenance Strategies.   We discussed ways to optimize her metabolism.  We discussed specific exercise goals and dietary habits conducive to a healthy weight.  We discussed the importance of lifelong vitamin supplementation and the potential medical complications of vitamin non-compliance.  We discussed the importance of restorative sleep and stress management in maintaining a healthy weight.  I reminded her to avoid alcohol for 1 year post-operatively, to avoid NSAIDS for life unless specifically indicated and used with a concomitant PPI, to avoid caffeine in excess, and explained the importance of lifelong tobacco abstinence.  Medical Decision Makin minutes spent on the date of the encounter doing chart review, history and exam, documentation and further activities per the note      North Bray MD  [facility]  3/18/2022  12:15 PM

## 2022-03-18 NOTE — LETTER
3/18/2022         RE: Yokasta Gary  [address]  [place] [address]        Dear Colleague,    Thank you for referring your patient, Yokasta Gary, to the [facility] [place]. Please see a copy of my visit note below.    Outpatient Bariatric Medicine Progress Note-Structured Weight Loss   Indication: Medical bariatric therapy to precede bariatric surgery    Surgery:  Lupe en Y Gastric Bypass    Surgeon: UMBERTO    Impression     36 year old female with Body mass index is 49.51 kg/m . in the setting of morbid obesity which satisfies the NIH criteria for bariatric surgery. She is progressing through the program well. She's started her metformin and is tolerating it reasonably well for new onset type II diabetes (A1c of 6.8%). exercise focus has been good lately and anticipate progression to surgery in late spring if all continues on current trajectory..      Comorbidities:  Patient Active Problem List   Diagnosis     Pregnancy     Insulin controlled gestational diabetes mellitus (GDM) in second trimester     Morbid obesity (H)       Plan   Weight will need to be 280 lb prior to the 2 week pre-operative diet.  Heparin Protocol: standard  CPAP: none  Actigall: will be used for 6months post operatively.  Exercise Plan: walking/Peloton work 3x weekly w/ mix of weight training.   Contraception Plan: tubal and mirena  Agrees to take vitamins for Life: yes, reviewed labs again today from 22.  Agrees to follow up for life: yes  Medication Management: will stop metformin day prior to surgery.  .  Past Surgical History        Past Surgical History:   Procedure Laterality Date     ANTERIOR CRUCIATE LIGAMENT REPAIR Left       SECTION N/A 10/17/2014    Procedure:  SECTION;  Surgeon: Maureen Chanel MD;  Location: [facility] OR;  Service:       SECTION N/A 9/10/2018    Procedure:  SECTION, REPEAT;  Surgeon: Dorota Guzman MD;   "Location: [facility] OR;  Service:        Family History, Social History   Reviewed as documented. See time and date confirmation.    Interim History/Pre-op needs list    Initial Labs Complete and Reviewed: yes  Dietitian Visits Initiated/cleared: started  Weight Change since initial weight: in line  Psychologist visits initiated/cleared: cleared 3/3/22  HCM UTD:    Pap: will update   Mammogram: n/a   Colonscopy:  n/a   Other: NA  Sugars Well Controlled: yes, A1c under 7%  Cardiac: no issues  Pulmonary: normal  Hormone use: none.  Birth control tubal/mirena to help w/ menorrhagia. .  Other: n/a.  Medications and Allergies      Current Outpatient Medications   Medication Sig Dispense Refill     Cyanocobalamin (B-12) 1000 MCG SUBL Take 3 days weekly 150 tablet 1     levonorgestrel (MIRENA, 52 MG,) 20 MCG/24HR IUD 1 each by Intrauterine route once       metFORMIN (GLUCOPHAGE) 500 MG tablet Start one tablet with supper for 2-3 weeks then increase if tolerating it to one tablet, twice daily with breakfast and supper. 180 tablet 0     Allergies: Patient has no known allergies.    Habits   Tobacco Use: none.  Alcohol Use: no issues  NSAIDS: irregularly.   Caffeine: ongoing. Will taper slowly.  SLEEP: n/a  CPAP: none.  PHYSICAL ACTIVITY PATTERNS:  Cardiovascular: see above  Strength Training: see above  Dietary History   Reviewed proper bariatric method again today.  The patient has been working with our bariatric dieticians and has started on progress..    As far as distracted eating/grazing, getting 3 meals daily, avoiding empty calories and optimizing their protein intake, making changes..    Review of Systems     Physical Exam   Vitals: /86 (BP Location: Right arm, Patient Position: Sitting, Cuff Size: Adult Small)   Ht 1.6 m (5' 3\")   Wt 126.8 kg (279 lb 8 oz)   BMI 49.51 kg/m    Body mass index is 49.51 kg/m .  GENERAL: appears her stated age. Ambulation is independent.  NECK:increase crease to back of " neck without pigmentation.  HEART: Rhythm regular, rate regular, no murmur.  LUNGS: Clear without wheezes.  ABDOMEN: soft, non-tender, obese,no rashes, surgical scars not evident but had c/s  MUSCULOSKELETAL: no obvious deformities  VASCULAR: palpable peripheral pulses, no  lower extremity edema, no color changes of venous stasis, no ulcerations  SKIN: Rashes: none.    Counseling     We discussed the National Weight Control Registry and Healthy Weight Maintenance Strategies.   We discussed ways to optimize her metabolism.  We discussed specific exercise goals and dietary habits conducive to a healthy weight.  We discussed the importance of lifelong vitamin supplementation and the potential medical complications of vitamin non-compliance.  We discussed the importance of restorative sleep and stress management in maintaining a healthy weight.  I reminded her to avoid alcohol for 1 year post-operatively, to avoid NSAIDS for life unless specifically indicated and used with a concomitant PPI, to avoid caffeine in excess, and explained the importance of lifelong tobacco abstinence.  Medical Decision Makin minutes spent on the date of the encounter doing chart review, history and exam, documentation and further activities per the note      North Bray MD  [facility]  3/18/2022  12:15 PM      Again, thank you for allowing me to participate in the care of your patient.        Sincerely,        North Bray MD

## 2022-03-18 NOTE — PATIENT INSTRUCTIONS
Plan   Weight will need to be 280 lb prior to the 2 week pre-operative diet.  Heparin Protocol: standard  CPAP: none  Actigall: will be used for 6months post operatively.  Exercise Plan: walking/[employer] work 3x weekly w/ mix of weight training.   Contraception Plan: tubal and mirena  Agrees to take vitamins for Life: yes, reviewed labs again today from 2/4/22.  Agrees to follow up for life: yes  Medication Management: will stop metformin day prior to surgery.  Think about activities/adventures that your more active body can enjoy.          Before being submitted for insurance approval, you will need the following:    -Clearance by the Psychologist  -Clearance by the dietitian  -Attend Support Group ([address] [place] [address] at [facility]) 2nd Tuesday of the month 6:30-8pm. Make sure to sign in.  -Routine Health Care Maintenance must be up to date (mammograms yearly after age 40, paps as recommended by your primary provider,  colonoscopy after age 50, earlier if high risk.  -If you are on estrogen, estrogen will be discontinued one month prior to surgery. It may be resumed one month after surgery unless otherwise advised to limit blood clotting risks.  -Pre Operative Lab work-ordered today.    -Structured weight loss visits IF mandated by your insurance carrier or bariatrician.  -Surgeon consult as we get nearer to potential surgery or sooner if you have special considerations that may make your surgery more complex.  -If you have sleep apnea you will need to be using CPAP for at least one month before surgery to reduce your risk of breathing problems after surgery. Make sure to bring your CPAP or BiPAP to the hospital at the time of surgery.    -You will need to be tobacco free for 2 months before surgery and remain a non-smoker thereafter. If you are currently smoking or have recently quit, your urine will be evaluated for tobacco metabolites pre-operatively.  Smoking is a major risk factor for developing ulceration of  your surgical sites and potential severe complications.    -If you are on insulin, you might be referred to an endocrinologist who will manage your insulin during the liquid diet and around the time of surgery. This endocrinologist does not replace your primary provider or your endocrinologist.   -You will need an exercise plan which includes MOVE, ie., walking and MUSCLE, ie.,calisthenics, bands, weight, machines, etc...Maintenance of long term weight loss and quality of life is much improved with regular exercise as the weight comes off.  ______________________________________________________________________    Remember that after your bariatric surgery, vitamin supplementation is a lifelong need.    You will take:    B-12 300-500mcg or higher sublingual (under the tongue) daily or by 1000mcg injection 1-2X/month  D3:  3000- 5000 IUs daily   Multivitamin containing 18mg of iron twice a day  Calcium citrate 1 or 2 daily    To keep your weight off and your vitamin levels up, follow-up is important.    Your labs will be monitored every 6 months for the first two years (every 3 months if you had a duodenal switch) and yearly thereafter.    To avoid ulcers in your stomach avoid tobacco forever, alcohol in excess, caffeine in excess and anti-inflammatories (NSAIDS)  (Aspirin, Ibuprofen, Naproxen and similar medications). Tylenol is fine at usual doses on the box.    If you are told by your physician take Aspirin to protect your heart or for another reason, make sure to take omeprazole or similar medication (protonix, nexium, prevacid) to protect your stomach.    Remember that alcohol affects you differently after bariatric surgery. If you have even ONE drink DO NOT DRIVE due to rapid absorption and fast spiking of blood alcohol levels within minutes of consumption.     Slowly Increasing your exercise capacity such that 3-6 months after your surgery you're tolerating 150-250 minutes of exercise weekly will help optimize  your metabolism and improve the chance of good weight loss maintenance.              LEAN PROTEIN SOURCES  Getting 20-30 grams of protein, 3 meals daily, is appropriate for most people, some need more but more than about 40 grams per meal is not useful.  General rule is drinking one ounce of water per gram of protein eaten over the course of the day:  70 grams of protein each day, drink 70 oz of water.  Protein Source Portion Calories Grams of Protein                           Nonfat, plain Greek yogurt    (10 grams sugar or less) 3/4 cup (6 oz)  12-17   Light Yogurt (10 grams sugar or less) 3/4 cup (6 oz)  6-8   Protein Shake 1 shake 110-180 15-30   Skim/1% Milk or lactose-free milk 1 cup ( 8 oz)  8   Plain or light, flavored soymilk 1 cup  7-8   Plain or light, hemp milk 1 cup 110 6   Fat Free or 1% Cottage Cheese 1/2 cup 90 15   Part skim ricotta cheese 1/2 cup 100 14   Part skim or reduced fat cheese slices 1 ounce 65-80 8     Mozzarella String Cheese 1 80 8   Canned tuna, chicken, crab or salmon  (canned in water)  1/2 cup 100 15-20   White fish (broiled, grilled, baked) 3 ounces 100 21   [place]/Tuna (broiled, grilled, baked) 3 ounces 150-180 21   Shrimp, Scallops, Lobster, Crab 3 ounces 100 21   Pork loin, Pork Tenderloin 3 ounces 150 21   Boneless, skinless chicken /turkey breast                          (broiled, grilled, baked) 3 ounces 120 21   [place], [place], [place], and Venison 3 ounces 120 21   Lean cuts of red meat and pork (sirloin,   round, tenderloin, flank, ground 93%-96%) 3 ounces 170 21   Lean or Extra Lean Ground Turkey 1/2 cup 150 20   90-95% Lean [place] Burger 1 veronica 140-180 21   Low-fat casserole with lean meat 3/4 cup 200 17   Luncheon Meats                                                        (turkey, lean ham, roast beef, chicken) 3 ounces 100 21   Egg (boiled, poached, scrambled) 1 Egg 60 7   Egg Substitute 1/2 cup 70 10   Nuts (limit to 1 serving per day)  3 Tbsp.  150 7   Nut Badger Lee (peanut, almond)  Limit to 1 serving or less daily 1 Tbsp. 90 4   Soy Burger (varies) 1  15   Garbanzo, Black, Huntley Beans 1/2 cup 110 7   Refried Beans 1/2 cup 100 7   Kidney and Lima beans 1/2 cup 110 7   Tempeh 3 oz 175 18   Vegan crumbles 1/2 cup 100 14   Tofu 1/2 cup 110 14   Chili (beans and extra lean beef or turkey) 1 cup 200 23   Lentil Stew/Soup 1 cup 150 12   Black Bean Soup 1 cup 175 12         Example Meal Plan for a 3323-2134 Calorie Diet:    In order to fuel your weight loss properly and avoid hunger-induced overeating later in the day, for your height and weight, you will enjoy the most success by following the diet below or similar with adjustments based on your particular tastes and preferences.  Exercise may influence speed, amount of weight loss further.     I recommend getting into a meal routine and keeping it similar day to day in the beginning so you don t have to think too hard about what you re going to make/eat.  Keep snacks healthy, ideally containing protein and some vegetables.  Non-processed food is preferable to packaged items.  Eat at least a few crunchy green vegetables if having a snack, which should be 2-3 hours after your mealtimes(prepare these ahead of time for ease of use).  Drink 64 oz -80 oz of water daily for most, some of you will need more and we'll discuss it at your visit if that is the case.      When changing our diet,  we can often mistake thirst for hunger or just have some distracted eating habits that we need to break free from ('bored/mindless eating', screen time,work, driving,etc).  A glass of water and reconsideration of our hunger is often all that is needed.  Having the urge is not the problem, but watching it pass by without acting on it is the goal.    If you re having hunger problems, add a protein drink/snack to your morning hours or afternoon snack with at least 20grams of protein and not too much sugar (under 10g).  A carton  of higher protein/low sugar yogurt can work as well.  If the urge to snack is overwhelming and not satiated, try going for a 10 minute walk/exercise, come home and drink a glass of water and if still hungry, have a  calorie snack (handful of raw/sprouted nuts, veggies and string cheese, protein bar, etc).  Savor it.    It is better to have a large breakfast, a moderate lunch and a smaller dinner to fuel your day.  People lose 10-15% more weight during their weight loss season with this strategy. Optimizing your protein intake at each meal will further keep you more satisfied while eating less food overall.  Getting exercise in early has also been shown to offer the best results (before breakfast ideally but anytime is the right time to exercise if that is not an option for you).    To make sure you re getting adequate vitamins and minerals during weight loss, I recommend one complete multivitamin a day of your choice.  Consider a probiotic and taking some vitamin D 2000 IU daily.    Let supper be your last meal of the day and ideally try to have at least 12 hours between supper and breakfast the next day to tap into some beneficial overnight fasting dynamics.  Midnight snacks need to go away. Water in the evening is fine, unsweetened, non caffeinated herbal tea is helpful as well.  Consolidating your meals within a 8-12 hour period of your day will help tap into these additional metabolic benefits and tends to keep your appetite up for breakfast, further helping to stay on track.  For most of my patients, I don't recommend an intermittent fasting style diet (many find it hard to fit in their lifestyle) but an overnight fast is very doable for most patients and helps regulate our hunger drives a little better.  This makes it very important to nail good intake at all three meals to feel satisfied/energized and still lose weight.      If evening snacking desires are high, consider a glass of fiber supplement for  some additional fullness (metamucil or similar). Most of us don't get the 25-30 grams per day of fiber that promotes good gut health/satiety.  Benefiber, metamucil, citrucel are reasonable/affordable options for most people.  Inulin, chicory, psyllium husk are reasonable options but start slow and low in the dose to avoid gas/bloating until your gut gets acclimated (ramping up to 5-10 grams per day of supplemental fiber after 3-4 weeks if needed).      Example Meal Plan:  Breakfast: 450-475 Calories  1 egg cooked on low in olive oil:   calories.  5oz Greek Yogurt (Fage plain classic: ~150 marian)  Handful of Berries of your choice (about a calorie per berry or 20-40cal per handful)    cup(cooked) of  old fashioned oatmeal or 1/2 cup(cooked) steel cut oats. (150 marian)  Sprinkle amount of brown sugar and a pat of butter. (40 marian)  Glass of  Water  Black coffee or unsweetened Tea (0calories).      2-3 hours Later Snack: (195 calories).  Glass of water  One string Cheese (80 calories) or 4 oz creamed cottage cheese (115 calories) with  Crunchy Celery sticks (less than 10 calories per large stalk) 2 stalks. (20 calories)    of a  Large Banana or   of a Large Apple (60 calories):  eat second half at lunch or afternoon snack.     Lunch:300 -350 calories   Chicken Breast  (baked/broiled/roasted/grilled)  4-6 oz.  (125-180 marian), BBQ sauce/hot sauce/mustard/seasoning is free. Just use a reasonable amount. Or a can of tuna with 1 tablespoon mayonnaise.  Salad: lettuce, any other veggies (cucumbers, green peppers/celery you like and a small drizzle of dressing to just flavor.  Go as big on the veggies as you like,  as they are practically calorie free.   A whole, 8 inch cucumber is 45 calories, a whole green pepper is 23 calories, a stalk of celery is 9 calories.  [place] Dressing is 60 calories per tablespoon..so moderate your desired dressing or do a drizzle of olive oil and splash of balsamic vinegar on top,  Total  calories unlikely to be over 150 even with dressing.  Glass of Water.    Option for lunch is meal replacement protein drink/smoothie.  Need at least 20 grams of protein and eat the rest of your apple/banana from the morning snack.      Afternoon Snack: 150-200 calories   Cheese Stick or cottage cheese again  and a fresh fruit OR  Granola Bar (protein Bar acceptable if under 200 calories OR  Homemade smoothies:  8oz skim milk,  a handful of berries (fresh or frozen and a serving of protein powder such as BiPro or Stacy sWhey for example.  If you don't like dairy, make with 8oz water, one small banana, handful of berries and the protein powder, add any veggies you want as well:  roughly 200 calories.   Glass of Water    Dinner: 325 calories  4oz of fresh, Atlantic salmon.  Broiled (salt/pepper/dill) for about 8-8.5 minutes (200calories) or  4oz filet mignon steak or sirloin steak  Salad or vegetable sautéed lightly in olive oil or   Broccoli 1.5  cups chopped and steamed  or micro-waved in a little water (75 calories)  Glass of Water,    Cup of herbal tea (unsweetened, caffeine free)      Herbs and seasonings are encouraged to flavor your foods/vegetables.  Make your food delicious.      Tips for Success:  1.  Prepare proteins ahead of time (broil chicken breasts in bulk so you can grab and go), steel cut oats/lentils can be stored in casserole dish/bowl in the fridge for quick scoop in the morning and rewarm in microwave, make use of crock pot recipes (watch salt content).  Making meals that cover 3-4 future meals is an easy way to stay on track.  2.  Drink a 8-12 oz glass of water every 2-3 hours when awake.  We often mistake hunger for thirst, especially when losing weight.  3. Remember your Reward and Motivation when things get hard.  4.  Weigh yourself every morning and record, you'll stay on track better and learn how our biorhythms, diet and elimination patterns show up on the scale. Don't worry about 1 or 2 day  "patterns, but when on track you'll notice good trend downward of weight over 3-4 day segments.  Plateaus tend to resolve after 4-8 days in most cases if you stay consistent with your plan.  These are natural and part of weight loss, even if you're perfect with your plan execution.  5. Call if problems/concerns.  FAG[employer] is a great tool to stay in touch and provide weekly outside accountability. Check in with questions or if you want to brag.  6.  Find a handful of meals/foods that keep you on track and feeling good and get into a routine that is sustainable for you.  It's OK to have a routine that works for you.  7.  Consider taking a complete multivitamin just to make sure all micronutrients are adequate during weight loss.  8. If losing hair/brittle nails it usually means you are not taking enough protein.  Minimum goal is 60 grams daily of protein for smaller women, 80 grams a day for men. Consider taking Biotin as supplement or a \"Hair and Nail\" multivitamin.      "

## 2022-04-14 ENCOUNTER — VIRTUAL VISIT (OUTPATIENT)
Dept: SURGERY | Facility: CLINIC | Age: 37
End: 2022-04-14
Payer: COMMERCIAL

## 2022-04-14 VITALS — HEIGHT: 63 IN | BODY MASS INDEX: 48.73 KG/M2 | WEIGHT: 275 LBS

## 2022-04-14 DIAGNOSIS — E66.01 OBESITY, CLASS III, BMI 40-49.9 (MORBID OBESITY) (H): ICD-10-CM

## 2022-04-14 DIAGNOSIS — E11.9 TYPE 2 DIABETES MELLITUS WITHOUT COMPLICATION, WITHOUT LONG-TERM CURRENT USE OF INSULIN (H): Primary | ICD-10-CM

## 2022-04-14 PROCEDURE — 97803 MED NUTRITION INDIV SUBSEQ: CPT | Mod: 95 | Performed by: DIETITIAN, REGISTERED

## 2022-04-14 NOTE — LETTER
4/14/2022         RE: Yokasta Gary  [address] [facility] [address]        Dear Colleague,    Thank you for referring your patient, Yokasta Gary, to the [facility] [place]. Please see a copy of my visit note below.    Yokasta Gary is a 37 year old who is being evaluated via a billable video visit.      How would you like to obtain your AVS? MyChart  If the video visit is dropped, the invitation should be resent by: Send to e-mail at: [email or web address]  Will anyone else be joining your video visit? No      Video Start Time: 12:29 PM      Follow Up Surgical Weight Loss Supervised Diet Evaluation    Assessment:  Pt. is being seen today for a follow up RD nutritional evaluation. Pt. has been unsuccessful with non-surgical weight loss methods and is interested in bariatric surgery. Today we reviewed current eating habits and level of physical activity, and instructed on the changes that are required for successful bariatric outcomes.    Surgery of interest per pt: RNY.    Workflow review:  Support Group: plans on attending in May.  Psychology:Completed.  Lab work:Completed.  SWL:Yes 4/6      Weight goal: At or below initial.    Anthropometrics:  Pt's weight is 275 lbs 0 oz  Initial weight: 280lb  Weight change: down 5lb  BMI: Body mass index is 48.71 kg/m .   Ideal body weight: 52.4 kg (115 lb 8.3 oz)  Adjusted ideal body weight: 82.2 kg (181 lb 1.8 oz)  Estimated RMR (Sutter-St Jeor equation):  1899 kcals x 1.2 (sedentary) = 2279 kcals (for weight maintenance)    Medical History:  Patient Active Problem List   Diagnosis     Pregnancy     Insulin controlled gestational diabetes mellitus (GDM) in second trimester     Morbid obesity (H)     Diabetes mellitus, type 2 (H)      Diabetes: not testing blood glucose- most recent 6.8     Progress over past month: working on cutting back on caffeine- one soda every other day  Sticking to protein first    Diet  Recall/Time  Breakfast: greek yogurt with fruit and a bit of peanut butter  Lunch: bag salad kit with chicken  Dinner: pork tenderloin/salmon/chicken with broccoli or green beans and rice or potatoes    Typical Snacks: really working on trying to get away from nighttime snacking- states she is a night owl- apple or popcorn, string cheese, etc.     Eating out: averaging 2-3 times per week out    Meal duration: states this is hard- reminding self to chew foods well- working on being ok with not finishing plate  +was out of town last week and split meals with a co-worker     fluids by 30 minutes before, during meal, and waiting 30 minutes after meal before drinking fluids: states this will be the focus for this month- states she likes to drink with meals    Beverages  Working on decreasing caffeine  64oz water per day    Exercise  Walking dog daily    PES statement:   (NI-1.3) Excessive energy intake related to Food and nutrition related knowledge deficit concerning excessive energy/oral intake as evidenced by Intake of high caloric density foods/beverages (juice, soda, alcohol) at meals and/or snacks; large portions; frequent grazing; estimated intake that exceeds estimated daily energy intake; binge eating patterns; frequent fast food or restaurant intake; and BMI 48.71     Intervention    Nutrition Education:   1. Provided general overview of diet and lifestyle modifications needed to be a deemed a safe candidate for bariatric surgery.   2. Educated patient on how to read a food label: choosing foods with than 10 grams fat and 10 grams sugar per serving to avoid dumping syndrome.  3. Dumping Syndrome: Described the mechanisms of syndrome, symptoms, and prevention tools from a dietary perspective.     Food/Nutrient Delivery:  4. Educated patient on eating three meals, with cutting out snacking.  5. Bariatric Plate: Patient and I discussed the importance of including a lean protein source (20-30 grams/meal),  vegetables (included at lunch and dinner), one serving (15g) of carbohydrate, and limited added fat (1 tb/day) at each meal.   6. Educated patient on using a protein powder drink as a meal replacement and/or supplement after bariatric surgery.   7. Discussed importance of adequate hydration after surgery, with goal of at least 64 oz of fluids/day.  8. Addressed avoiding all carbonated, caffeinated and sweetened drinks to prepare for bariatric surgery.     Nutrition Counselin. Mindful eating techniques: Encouraged slow meal pace, chewing foods to applesauce consistency for 20-30 minutes/meal.   10. Discussed  fluids 30 minutes before, during, and after meal to prevent dumping syndrome and discomfort post bariatric surgery.     Instructions/Goals:     1. Continue implementing bariatric surgery lifestyle modifications.  2. Separate fluids from meals  3. Cut back on carbonated beverages    Handouts Provided:   Dumping syndrome and label reading    Monitor/Evaluation:  Pt. s target weight: no gain from initial visit, pt. verbalized understanding.       Plan for next visit:   (Final supervised diet visit with RD) pre/post-op  diet progression, give review of surgery process.      Video-Visit Details    Type of service:  Video Visit    Video End Time:12:48 PM    Originating Location (pt. Location): Home    Distant Location (provider location):  [facility]     Platform used for Video Visit: Landry PHAN RD          Again, thank you for allowing me to participate in the care of your patient.        Sincerely,        ONEIDA PHAN RD

## 2022-04-14 NOTE — PROGRESS NOTES
Yokasta Gary is a 37 year old who is being evaluated via a billable video visit.      How would you like to obtain your AVS? MyChart  If the video visit is dropped, the invitation should be resent by: Send to e-mail at: [email or web address]  Will anyone else be joining your video visit? No      Video Start Time: 12:29 PM      Follow Up Surgical Weight Loss Supervised Diet Evaluation    Assessment:  Pt. is being seen today for a follow up RD nutritional evaluation. Pt. has been unsuccessful with non-surgical weight loss methods and is interested in bariatric surgery. Today we reviewed current eating habits and level of physical activity, and instructed on the changes that are required for successful bariatric outcomes.    Surgery of interest per pt: RNY.    Workflow review:  Support Group: plans on attending in May.  Psychology:Completed.  Lab work:Completed.  SWL:Yes 4/6      Weight goal: At or below initial.    Anthropometrics:  Pt's weight is 275 lbs 0 oz  Initial weight: 280lb  Weight change: down 5lb  BMI: Body mass index is 48.71 kg/m .   Ideal body weight: 52.4 kg (115 lb 8.3 oz)  Adjusted ideal body weight: 82.2 kg (181 lb 1.8 oz)  Estimated RMR ([place]-St Jeor equation):  1899 kcals x 1.2 (sedentary) = 2279 kcals (for weight maintenance)    Medical History:  Patient Active Problem List   Diagnosis     Pregnancy     Insulin controlled gestational diabetes mellitus (GDM) in second trimester     Morbid obesity (H)     Diabetes mellitus, type 2 (H)      Diabetes: not testing blood glucose- most recent 6.8     Progress over past month: working on cutting back on caffeine- one soda every other day  Sticking to protein first    Diet Recall/Time  Breakfast: greek yogurt with fruit and a bit of peanut butter  Lunch: bag salad kit with chicken  Dinner: pork tenderloin/salmon/chicken with broccoli or green beans and rice or potatoes    Typical Snacks: really working on trying to get away from nighttime snacking-  states she is a night owl- apple or popcorn, string cheese, etc.     Eating out: averaging 2-3 times per week out    Meal duration: states this is hard- reminding self to chew foods well- working on being ok with not finishing plate  +was out of town last week and split meals with a co-worker     fluids by 30 minutes before, during meal, and waiting 30 minutes after meal before drinking fluids: states this will be the focus for this month- states she likes to drink with meals    Beverages  Working on decreasing caffeine  64oz water per day    Exercise  Walking dog daily    PES statement:   (NI-1.3) Excessive energy intake related to Food and nutrition related knowledge deficit concerning excessive energy/oral intake as evidenced by Intake of high caloric density foods/beverages (juice, soda, alcohol) at meals and/or snacks; large portions; frequent grazing; estimated intake that exceeds estimated daily energy intake; binge eating patterns; frequent fast food or restaurant intake; and BMI 48.71     Intervention    Nutrition Education:   1. Provided general overview of diet and lifestyle modifications needed to be a deemed a safe candidate for bariatric surgery.   2. Educated patient on how to read a food label: choosing foods with than 10 grams fat and 10 grams sugar per serving to avoid dumping syndrome.  3. Dumping Syndrome: Described the mechanisms of syndrome, symptoms, and prevention tools from a dietary perspective.     Food/Nutrient Delivery:  4. Educated patient on eating three meals, with cutting out snacking.  5. Bariatric Plate: Patient and I discussed the importance of including a lean protein source (20-30 grams/meal), vegetables (included at lunch and dinner), one serving (15g) of carbohydrate, and limited added fat (1 tb/day) at each meal.   6. Educated patient on using a protein powder drink as a meal replacement and/or supplement after bariatric surgery.   7. Discussed importance of  adequate hydration after surgery, with goal of at least 64 oz of fluids/day.  8. Addressed avoiding all carbonated, caffeinated and sweetened drinks to prepare for bariatric surgery.     Nutrition Counselin. Mindful eating techniques: Encouraged slow meal pace, chewing foods to applesauce consistency for 20-30 minutes/meal.   10. Discussed  fluids 30 minutes before, during, and after meal to prevent dumping syndrome and discomfort post bariatric surgery.     Instructions/Goals:     1. Continue implementing bariatric surgery lifestyle modifications.  2. Separate fluids from meals  3. Cut back on carbonated beverages    Handouts Provided:   Dumping syndrome and label reading    Monitor/Evaluation:  Pt. s target weight: no gain from initial visit, pt. verbalized understanding.       Plan for next visit:   (Final supervised diet visit with RD) pre/post-op  diet progression, give review of surgery process.      Video-Visit Details    Type of service:  Video Visit    Video End Time:12:48 PM    Originating Location (pt. Location): Home    Distant Location (provider location):  [facility] [place]     Platform used for Video Visit: Landry PHAN RD

## 2022-05-12 ENCOUNTER — VIRTUAL VISIT (OUTPATIENT)
Dept: SURGERY | Facility: CLINIC | Age: 37
End: 2022-05-12
Payer: COMMERCIAL

## 2022-05-12 VITALS — WEIGHT: 275 LBS | HEIGHT: 63 IN | BODY MASS INDEX: 48.73 KG/M2

## 2022-05-12 DIAGNOSIS — E11.9 TYPE 2 DIABETES MELLITUS WITHOUT COMPLICATION, WITHOUT LONG-TERM CURRENT USE OF INSULIN (H): Primary | ICD-10-CM

## 2022-05-12 DIAGNOSIS — E66.01 OBESITY, CLASS III, BMI 40-49.9 (MORBID OBESITY) (H): ICD-10-CM

## 2022-05-12 PROCEDURE — 97803 MED NUTRITION INDIV SUBSEQ: CPT | Mod: 95 | Performed by: DIETITIAN, REGISTERED

## 2022-05-12 NOTE — LETTER
5/12/2022         RE: Yokasta Gary  [address] [place] [address]        Dear Colleague,    Thank you for referring your patient, Yokasta Gary, to the [facility] [place]. Please see a copy of my visit note below.    Yokasta Gary is a 37 year old who is being evaluated via a billable video visit.      How would you like to obtain your AVS? MyChart  If the video visit is dropped, the invitation should be resent by: Send to e-mail at: [email or web address]  Will anyone else be joining your video visit? No      Video Start Time: 12:22p      Follow Up Surgical Weight Loss Supervised Diet Evaluation    Assessment:  Pt. is being seen today for a follow up RD nutritional evaluation. Pt. has been unsuccessful with non-surgical weight loss methods and is interested in bariatric surgery. Today we reviewed current eating habits and level of physical activity, and instructed on the changes that are required for successful bariatric outcomes.    Surgery of interest per pt: RNY.    Workflow review:  Support Group: Completed.  Psychology:Completed.  Lab work:Completed.  SWL:Yes 5/6      Weight goal: At or below initial.    Anthropometrics:  Pt's weight is 275 lbs 0 oz  Initial weight: 280lb  Weight change: down 5lb  BMI: Body mass index is 48.71 kg/m .   Ideal body weight: 52.4 kg (115 lb 8.3 oz)  Adjusted ideal body weight: 81.3 kg (179 lb 5 oz)  Estimated RMR ([place]-St Jeor equation):  1899 kcals x 1.2 (sedentary) = 2279 kcals (for weight maintenance)      Medical History:  Patient Active Problem List   Diagnosis     Pregnancy     Insulin controlled gestational diabetes mellitus (GDM) in second trimester     Morbid obesity (H)     Diabetes mellitus, type 2 (H)      Diabetes: yes    Progress over past month: really busy with kid's activities and being back in the office more- nervous about what she might/might not tolerate after surgery- struggles with protein  shakes      Meal duration: 20-30 minutes.      fluids by 30 minutes before, during meal, and waiting 30 minutes after meal before drinking fluids: Yes    Beverages  Water, 1 cup coffee each day    Exercise  Walking more    PES statement:   (NI-1.3) Excessive energy intake related to Food and nutrition related knowledge deficit concerning excessive energy/oral intake as evidenced by Intake of high caloric density foods/beverages (juice, soda, alcohol) at meals and/or snacks; large portions; frequent grazing; estimated intake that exceeds estimated daily energy intake; binge eating patterns; frequent fast food or restaurant intake; and BMI 48.71     Intervention    Nutrition Education:   1. Provided general overview of diet and lifestyle modifications needed to be a deemed a safe candidate for bariatric surgery.    2. Vitamins: Educated on post-op vitamin regimen including MVI+ 18 mg Fe two times a day, calcium citrate 400-600 mg two times a day, 6637-2206 mcg sublingual B12 daily, 5000 IU vitamin D3 daily.     Food/Nutrient Delivery:  3. Educated patient on eating three meals, with cutting out snacking.  4. Bariatric Plate: Patient and I discussed the importance of including a lean protein source (20-30 grams/meal), vegetables (included at lunch and dinner), one serving (15g) of carbohydrate, and limited added fat (1 tb/day) at each meal.   5. Educated patient on using a protein powder drink as a meal replacement and/or supplement after bariatric surgery.   6. Discussed importance of adequate hydration after surgery, with goal of at least 64 oz of fluids/day.  7. Addressed avoiding all carbonated, caffeinated and sweetened drinks to prepare for bariatric surgery.     Nutrition Counselin. Mindful eating techniques: Encouraged slow meal pace, chewing foods to applesauce consistency for 20-30 minutes/meal.   9. Discussed  fluids 30 minutes before, during, and after meal to prevent dumping  syndrome and discomfort post bariatric surgery.   10. Discussed pre/post operative diet progression, post op vitamin regimen, gave review of surgery process.     Instructions/Goals:     1. Continue implementing bariatric surgery lifestyle modifications.    Handouts Provided:   Bariatric Vitamins    Monitor/Evaluation:  Pt. s target weight: no gain from initial visit, pt. verbalized understanding.     Pt has completed all nutrition requirements and is well-informed of the dietary and physical activity requirements that are necessary for successful bariatric outcomes. This pt is an appropriate candidate for surgery from a nutrition standpoint at this time. The patient understands that surgery is a tool, not a cure, and post operative follow up is essential.     Plan for next visit:   Post op RD visits      Video-Visit Details    Type of service:  Video Visit    Video End Time:12:49 PM    Originating Location (pt. Location): Home    Distant Location (provider location):  [facility]     Platform used for Video Visit: Landry PHAN RD          Again, thank you for allowing me to participate in the care of your patient.        Sincerely,        ONEIDA PHAN RD

## 2022-05-12 NOTE — PROGRESS NOTES
Yokasta Gary is a 37 year old who is being evaluated via a billable video visit.      How would you like to obtain your AVS? MyChart  If the video visit is dropped, the invitation should be resent by: Send to e-mail at: [email or web address]  Will anyone else be joining your video visit? No      Video Start Time: 12:22p      Follow Up Surgical Weight Loss Supervised Diet Evaluation    Assessment:  Pt. is being seen today for a follow up RD nutritional evaluation. Pt. has been unsuccessful with non-surgical weight loss methods and is interested in bariatric surgery. Today we reviewed current eating habits and level of physical activity, and instructed on the changes that are required for successful bariatric outcomes.    Surgery of interest per pt: RNY.    Workflow review:  Support Group: Completed.  Psychology:Completed.  Lab work:Completed.  SWL:Yes 5/6      Weight goal: At or below initial.    Anthropometrics:  Pt's weight is 275 lbs 0 oz  Initial weight: 280lb  Weight change: down 5lb  BMI: Body mass index is 48.71 kg/m .   Ideal body weight: 52.4 kg (115 lb 8.3 oz)  Adjusted ideal body weight: 81.3 kg (179 lb 5 oz)  Estimated RMR ([place]-St Jeor equation):  1899 kcals x 1.2 (sedentary) = 2279 kcals (for weight maintenance)      Medical History:  Patient Active Problem List   Diagnosis     Pregnancy     Insulin controlled gestational diabetes mellitus (GDM) in second trimester     Morbid obesity (H)     Diabetes mellitus, type 2 (H)      Diabetes: yes    Progress over past month: really busy with kid's activities and being back in the office more- nervous about what she might/might not tolerate after surgery- struggles with protein shakes      Meal duration: 20-30 minutes.      fluids by 30 minutes before, during meal, and waiting 30 minutes after meal before drinking fluids: Yes    Beverages  Water, 1 cup coffee each day    Exercise  Walking more    PES statement:   (NI-1.3) Excessive energy intake  related to Food and nutrition related knowledge deficit concerning excessive energy/oral intake as evidenced by Intake of high caloric density foods/beverages (juice, soda, alcohol) at meals and/or snacks; large portions; frequent grazing; estimated intake that exceeds estimated daily energy intake; binge eating patterns; frequent fast food or restaurant intake; and BMI 48.71     Intervention    Nutrition Education:   1. Provided general overview of diet and lifestyle modifications needed to be a deemed a safe candidate for bariatric surgery.    2. Vitamins: Educated on post-op vitamin regimen including MVI+ 18 mg Fe two times a day, calcium citrate 400-600 mg two times a day, 9234-2439 mcg sublingual B12 daily, 5000 IU vitamin D3 daily.     Food/Nutrient Delivery:  3. Educated patient on eating three meals, with cutting out snacking.  4. Bariatric Plate: Patient and I discussed the importance of including a lean protein source (20-30 grams/meal), vegetables (included at lunch and dinner), one serving (15g) of carbohydrate, and limited added fat (1 tb/day) at each meal.   5. Educated patient on using a protein powder drink as a meal replacement and/or supplement after bariatric surgery.   6. Discussed importance of adequate hydration after surgery, with goal of at least 64 oz of fluids/day.  7. Addressed avoiding all carbonated, caffeinated and sweetened drinks to prepare for bariatric surgery.     Nutrition Counselin. Mindful eating techniques: Encouraged slow meal pace, chewing foods to applesauce consistency for 20-30 minutes/meal.   9. Discussed  fluids 30 minutes before, during, and after meal to prevent dumping syndrome and discomfort post bariatric surgery.   10. Discussed pre/post operative diet progression, post op vitamin regimen, gave review of surgery process.     Instructions/Goals:     1. Continue implementing bariatric surgery lifestyle modifications.    Handouts Provided:   Bariatric  Vitamins    Monitor/Evaluation:  Pt. s target weight: no gain from initial visit, pt. verbalized understanding.     Pt has completed all nutrition requirements and is well-informed of the dietary and physical activity requirements that are necessary for successful bariatric outcomes. This pt is an appropriate candidate for surgery from a nutrition standpoint at this time. The patient understands that surgery is a tool, not a cure, and post operative follow up is essential.     Plan for next visit:   Post op RD visits      Video-Visit Details    Type of service:  Video Visit    Video End Time:12:49 PM    Originating Location (pt. Location): Home    Distant Location (provider location):  [facility] [place]     Platform used for Video Visit: Landry PHAN RD

## 2022-05-22 ENCOUNTER — HEALTH MAINTENANCE LETTER (OUTPATIENT)
Age: 37
End: 2022-05-22

## 2022-06-16 ENCOUNTER — VIRTUAL VISIT (OUTPATIENT)
Dept: SURGERY | Facility: CLINIC | Age: 37
End: 2022-06-16
Payer: COMMERCIAL

## 2022-06-16 DIAGNOSIS — E66.01 OBESITY, CLASS III, BMI 40-49.9 (MORBID OBESITY) (H): Primary | ICD-10-CM

## 2022-06-16 DIAGNOSIS — E11.9 TYPE 2 DIABETES MELLITUS WITHOUT COMPLICATION, WITHOUT LONG-TERM CURRENT USE OF INSULIN (H): ICD-10-CM

## 2022-06-16 PROCEDURE — 99214 OFFICE O/P EST MOD 30 MIN: CPT | Mod: 95 | Performed by: EMERGENCY MEDICINE

## 2022-06-16 NOTE — LETTER
6/16/2022         RE: Yokasta Gary  [address] [facility] [address]        Dear Colleague,    Thank you for referring your patient, Yokasta Gary, to the [facility] [place]. Please see a copy of my visit note below.    Yokasta Gary is 37 year old  female who presents for a billable video visit today.    How would you like to obtain your AVS? MyChart  If dropped from the video visit, the video invitation should be resent by: Send to e-mail at: [email or web address]  Will anyone else be joining your video visit? No      Video Start Time: 10:45 AM    Are there any specific questions or needs that you would like addressed at your visit today? Progress toward bariatirc surgery.    Provider Notes:   Outpatient Bariatric Medicine Progress Note-Structured Weight Loss   Indication: Medical bariatric therapy to precede bariatric surgery. Initial intake on 1/26/22 at 280 lbs, BMI 49.6 w/ comorbid, early onset, type II diabetes (6.8% A1c). Metformin was started at that visit.   had RNY in 2021.    Surgery:  Lupe en Y Gastric Bypass    Surgeon: UMBERTO    Impression     37 year old female with There is no height or weight on file to calculate BMI. in the setting of morbid obesity which satisfies the NIH criteria for bariatric surgery. She has cleared the preoperative education process and is doing quite well. I think she'll be an excellent RNY gastric bypass candidate which should benefit some periodic dyspepsia and newly evolving type II diabetes.  Her  is also a gastric bypass patient from last year and provides good support for her. Metformin therapy will discontinue 1-2 days prior to her future surgery.      Comorbidities:  Patient Active Problem List   Diagnosis     Pregnancy     Insulin controlled gestational diabetes mellitus (GDM) in second trimester     Morbid obesity (H)     Diabetes mellitus, type 2 (H)       Plan   Ready to schedule surgeon  visit to discuss type/timing of surgery.  Weight will need to be under 280lbs prior to the 2 week pre-operative diet.  Heparin Protocol: standard  CPAP: none.  Actigall: anticipate use for  months  Exercise Plan: hoping to continue peloton/walking regimen.   Contraception Plan: tubal and mirena  Agrees to take vitamins for Life: yes  Agrees to follow up for life: yes  Medication Management: plan to stop metformin 2 days prior to surgery and unlikely need to restart after gastric bypass.  Preop lab per usual..  Past Surgical History        Past Surgical History:   Procedure Laterality Date     ANTERIOR CRUCIATE LIGAMENT REPAIR Left       SECTION N/A 10/17/2014    Procedure:  SECTION;  Surgeon: Maureen Chanel MD;  Location: [facility];  Service:       SECTION N/A 9/10/2018    Procedure:  SECTION, REPEAT;  Surgeon: Dorota Guzman MD;  Location: [facility];  Service:        Family History, Social History   Reviewed as documented. See time and date confirmation.    Interim History/Pre-op needs list    Initial Labs Complete and Reviewed: yes.   Dietitian Visits Initiated/cleared: yes  Weight Change since initial weight: down 5 lbs  Psychologist visits initiated/cleared: yes  HCM UTD:    Pap: planning next week.   Mammogram: n/a   Colonscopy:     Other: NA  Sugars Well Controlled: a1c 6.8% prior to starting metformin  Cardiac: no issues  Pulmonary: no issues  Hormone use: mirena IUD  Birth control tubal.  Other: n/a.  Medications and Allergies      Current Outpatient Medications   Medication Sig Dispense Refill     Cyanocobalamin (B-12) 1000 MCG SUBL Take 3 days weekly 150 tablet 1     levonorgestrel (MIRENA, 52 MG,) 20 MCG/24HR IUD 1 each by Intrauterine route once       metFORMIN (GLUCOPHAGE) 500 MG tablet Start one tablet with supper for 2-3 weeks then increase if tolerating it to one tablet, twice daily with breakfast and supper. 180 tablet 0     Allergies:  Patient has no known allergies.    Habits   Tobacco Use: no  Alcohol Use: n  NSAIDS: will avoid  Caffeine: will wean down. Will stop a week before surgery.  SLEEP: 6-8 hours  CPAP: no  PHYSICAL ACTIVITY PATTERNS:  Cardiovascular: walking  Strength Training: peloton  Dietary History   Reviewed proper bariatric method again today.  The patient has been working with our bariatric dieticians and has been cleared.  .    As far as distracted eating/grazing, getting 3 meals daily, avoiding empty calories and optimizing their protein intake has continued to work on it..    Review of Systems     GENERAL/CONSTITUTIONAL:  Congestion.   HEENT:  Dental Pain: no issues  Trouble Swallowing: no  CARDIOVASCULAR:  Chest pain with exertion: no  Syncope: no.  PULMONARY:  CPAP Use: no  Asthma Controlled: n/a  GASTROINTESTINAL:  GERD/Heartburn: rarely   Gallbladder: intact  UROLOGIC:  Urinary Symptoms: no hx of stones/hematuria.   NEUROLOGIC:  Headaches: n/a  Paresthesias: no  PSYCHIATRIC:  Moods: good  MUSCULOSKELETAL/RHEUMATOLOGIC  Arthralgias: na/  Myalgias: na  ENDOCRINE:  Monitoring Blood Sugars: no  Sugars Well Controlled: yes, will confirm A1c. Metformin well tolerated.  DERMATOLOGIC:  Rashes: none  Physical Exam   Vitals: There were no vitals taken for this visit.  There is no height or weight on file to calculate BMI.  GENERAL: appears her stated age. Ambulation is independent.  NECK:thick.  HEART: n/a  LUNGS: rare dry cough  ABDOMEN: n/a  MUSCULOSKELETAL: no obvious deformities  VASCULAR:     Counseling     We discussed the National Weight Control Registry and Healthy Weight Maintenance Strategies.   We discussed ways to optimize her metabolism.  We discussed specific exercise goals and dietary habits conducive to a healthy weight.  We discussed the importance of lifelong vitamin supplementation and the potential medical complications of vitamin non-compliance.  We discussed the importance of restorative sleep and stress  management in maintaining a healthy weight.  I reminded her to avoid alcohol for 1 year post-operatively, to avoid NSAIDS for life unless specifically indicated and used with a concomitant PPI, to avoid caffeine in excess, and explained the importance of lifelong tobacco abstinence.  Medical Decision Makin minutes spent on the date of the encounter doing chart review, history and exam, documentation and further activities per the note      North Bray MD  [facility]  2022  10:46 AM      Video-Visit Details    Type of service:  Video Visit    Video End Time (time video stopped): 11:06 AM  Originating Location (pt. Location): Home    Distant Location (provider location):  [facility]     Platform used for Video Visit: Well      Again, thank you for allowing me to participate in the care of your patient.        Sincerely,        North Bray MD

## 2022-06-16 NOTE — PATIENT INSTRUCTIONS
Plan   Ready to schedule surgeon visit to discuss type/timing of surgery.  Weight will need to be under 280lbs prior to the 2 week pre-operative diet.  Heparin Protocol: standard  CPAP: none.  Actigall: anticipate use for  months  Exercise Plan: hoping to continue peloton/walking regimen.   Contraception Plan: tubal and mirena  Agrees to take vitamins for Life: yes  Agrees to follow up for life: yes  Medication Management: plan to stop metformin 2 days prior to surgery and unlikely need to restart after gastric bypass.  Preop lab per usual..              Before being submitted for insurance approval, you will need the following:    -Clearance by the Psychologist  -Clearance by the dietitian  -Attend Support Group ([address] [place] [address] at [facility]) 2nd Tuesday of the month 6:30-8pm. Make sure to sign in.  -Routine Health Care Maintenance must be up to date (mammograms yearly after age 40, paps as recommended by your primary provider,  colonoscopy after age 50, earlier if high risk.  -If you are on estrogen, estrogen will be discontinued one month prior to surgery. It may be resumed one month after surgery unless otherwise advised to limit blood clotting risks.  -Pre Operative Lab work-ordered today.    -Structured weight loss visits IF mandated by your insurance carrier or bariatrician.  -Surgeon consult as we get nearer to potential surgery or sooner if you have special considerations that may make your surgery more complex.  -If you have sleep apnea you will need to be using CPAP for at least one month before surgery to reduce your risk of breathing problems after surgery. Make sure to bring your CPAP or BiPAP to the hospital at the time of surgery.    -You will need to be tobacco free for 2 months before surgery and remain a non-smoker thereafter. If you are currently smoking or have recently quit, your urine will be evaluated for tobacco metabolites pre-operatively.  Smoking is a major risk factor for developing  ulceration of your surgical sites and potential severe complications.    -If you are on insulin, you might be referred to an endocrinologist who will manage your insulin during the liquid diet and around the time of surgery. This endocrinologist does not replace your primary provider or your endocrinologist.   -You will need an exercise plan which includes MOVE, ie., walking and MUSCLE, ie.,calisthenics, bands, weight, machines, etc...Maintenance of long term weight loss and quality of life is much improved with regular exercise as the weight comes off.  ______________________________________________________________________    Remember that after your bariatric surgery, vitamin supplementation is a lifelong need.    You will take:    B-12 300-500mcg or higher sublingual (under the tongue) daily or by 1000mcg injection 1-2X/month  D3:  3000- 5000 IUs daily   Multivitamin containing 18mg of iron twice a day  Calcium citrate 1 or 2 daily    To keep your weight off and your vitamin levels up, follow-up is important.    Your labs will be monitored every 6 months for the first two years (every 3 months if you had a duodenal switch) and yearly thereafter.    To avoid ulcers in your stomach avoid tobacco forever, alcohol in excess, caffeine in excess and anti-inflammatories (NSAIDS)  (Aspirin, Ibuprofen, Naproxen and similar medications). Tylenol is fine at usual doses on the box.    If you are told by your physician take Aspirin to protect your heart or for another reason, make sure to take omeprazole or similar medication (protonix, nexium, prevacid) to protect your stomach.    Remember that alcohol affects you differently after bariatric surgery. If you have even ONE drink DO NOT DRIVE due to rapid absorption and fast spiking of blood alcohol levels within minutes of consumption.     Slowly Increasing your exercise capacity such that 3-6 months after your surgery you're tolerating 150-250 minutes of exercise weekly will  help optimize your metabolism and improve the chance of good weight loss maintenance.

## 2022-06-16 NOTE — PROGRESS NOTES
Yokasta Gary is 37 year old  female who presents for a billable video visit today.    How would you like to obtain your AVS? MyChart  If dropped from the video visit, the video invitation should be resent by: Send to e-mail at: [email or web address]  Will anyone else be joining your video visit? No      Video Start Time: 10:45 AM    Are there any specific questions or needs that you would like addressed at your visit today? Progress toward bariatirc surgery.    Provider Notes:   Outpatient Bariatric Medicine Progress Note-Structured Weight Loss   Indication: Medical bariatric therapy to precede bariatric surgery. Initial intake on 1/26/22 at 280 lbs, BMI 49.6 w/ comorbid, early onset, type II diabetes (6.8% A1c). Metformin was started at that visit.   had RNY in 2021.    Surgery:  Lupe en Y Gastric Bypass    Surgeon: UMBERTO    Impression     37 year old female with There is no height or weight on file to calculate BMI. in the setting of morbid obesity which satisfies the NIH criteria for bariatric surgery. She has cleared the preoperative education process and is doing quite well. I think she'll be an excellent RNY gastric bypass candidate which should benefit some periodic dyspepsia and newly evolving type II diabetes.  Her  is also a gastric bypass patient from last year and provides good support for her. Metformin therapy will discontinue 1-2 days prior to her future surgery.      Comorbidities:  Patient Active Problem List   Diagnosis     Pregnancy     Insulin controlled gestational diabetes mellitus (GDM) in second trimester     Morbid obesity (H)     Diabetes mellitus, type 2 (H)       Plan   Ready to schedule surgeon visit to discuss type/timing of surgery.  Weight will need to be under 280lbs prior to the 2 week pre-operative diet.  Heparin Protocol: standard  CPAP: none.  Actigall: anticipate use for  months  Exercise Plan: hoping to continue peloton/walking regimen.   Contraception Plan: tubal  and mirena  Agrees to take vitamins for Life: yes  Agrees to follow up for life: yes  Medication Management: plan to stop metformin 2 days prior to surgery and unlikely need to restart after gastric bypass.  Preop lab per usual..  Past Surgical History        Past Surgical History:   Procedure Laterality Date     ANTERIOR CRUCIATE LIGAMENT REPAIR Left       SECTION N/A 10/17/2014    Procedure:  SECTION;  Surgeon: Maureen Chanel MD;  Location: [facility];  Service:       SECTION N/A 9/10/2018    Procedure:  SECTION, REPEAT;  Surgeon: Dorota Guzman MD;  Location: [facility];  Service:        Family History, Social History   Reviewed as documented. See time and date confirmation.    Interim History/Pre-op needs list    Initial Labs Complete and Reviewed: yes.   Dietitian Visits Initiated/cleared: yes  Weight Change since initial weight: down 5 lbs  Psychologist visits initiated/cleared: yes  HCM UTD:    Pap: planning next week.   Mammogram: n/a   Colonscopy:     Other: NA  Sugars Well Controlled: a1c 6.8% prior to starting metformin  Cardiac: no issues  Pulmonary: no issues  Hormone use: mirena IUD  Birth control tubal.  Other: n/a.  Medications and Allergies      Current Outpatient Medications   Medication Sig Dispense Refill     Cyanocobalamin (B-12) 1000 MCG SUBL Take 3 days weekly 150 tablet 1     levonorgestrel (MIRENA, 52 MG,) 20 MCG/24HR IUD 1 each by Intrauterine route once       metFORMIN (GLUCOPHAGE) 500 MG tablet Start one tablet with supper for 2-3 weeks then increase if tolerating it to one tablet, twice daily with breakfast and supper. 180 tablet 0     Allergies: Patient has no known allergies.    Habits   Tobacco Use: no  Alcohol Use: n  NSAIDS: will avoid  Caffeine: will wean down. Will stop a week before surgery.  SLEEP: 6-8 hours  CPAP: no  PHYSICAL ACTIVITY PATTERNS:  Cardiovascular: walking  Strength Training: peloton  Dietary History    Reviewed proper bariatric method again today.  The patient has been working with our bariatric dieticians and has been cleared.  .    As far as distracted eating/grazing, getting 3 meals daily, avoiding empty calories and optimizing their protein intake has continued to work on it..    Review of Systems     GENERAL/CONSTITUTIONAL:  Congestion.   HEENT:  Dental Pain: no issues  Trouble Swallowing: no  CARDIOVASCULAR:  Chest pain with exertion: no  Syncope: no.  PULMONARY:  CPAP Use: no  Asthma Controlled: n/a  GASTROINTESTINAL:  GERD/Heartburn: rarely   Gallbladder: intact  UROLOGIC:  Urinary Symptoms: no hx of stones/hematuria.   NEUROLOGIC:  Headaches: n/a  Paresthesias: no  PSYCHIATRIC:  Moods: good  MUSCULOSKELETAL/RHEUMATOLOGIC  Arthralgias: na/  Myalgias: na  ENDOCRINE:  Monitoring Blood Sugars: no  Sugars Well Controlled: yes, will confirm A1c. Metformin well tolerated.  DERMATOLOGIC:  Rashes: none  Physical Exam   Vitals: There were no vitals taken for this visit.  There is no height or weight on file to calculate BMI.  GENERAL: appears her stated age. Ambulation is independent.  NECK:thick.  HEART: n/a  LUNGS: rare dry cough  ABDOMEN: n/a  MUSCULOSKELETAL: no obvious deformities  VASCULAR:     Counseling     We discussed the National Weight Control Registry and Healthy Weight Maintenance Strategies.   We discussed ways to optimize her metabolism.  We discussed specific exercise goals and dietary habits conducive to a healthy weight.  We discussed the importance of lifelong vitamin supplementation and the potential medical complications of vitamin non-compliance.  We discussed the importance of restorative sleep and stress management in maintaining a healthy weight.  I reminded her to avoid alcohol for 1 year post-operatively, to avoid NSAIDS for life unless specifically indicated and used with a concomitant PPI, to avoid caffeine in excess, and explained the importance of lifelong tobacco  abstinence.  Medical Decision Makin minutes spent on the date of the encounter doing chart review, history and exam, documentation and further activities per the note      North Bray MD  [facility]  2022  10:46 AM      Video-Visit Details    Type of service:  Video Visit    Video End Time (time video stopped): 11:06 AM  Originating Location (pt. Location): Home    Distant Location (provider location):  [facility] SURGERY CLINIC AND BARIATRICS [facility]     Platform used for Video Visit: [email or web address]

## 2022-06-28 ENCOUNTER — OFFICE VISIT (OUTPATIENT)
Dept: FAMILY MEDICINE | Facility: CLINIC | Age: 37
End: 2022-06-28
Payer: COMMERCIAL

## 2022-06-28 VITALS
BODY MASS INDEX: 49.42 KG/M2 | TEMPERATURE: 98.7 F | WEIGHT: 279 LBS | SYSTOLIC BLOOD PRESSURE: 147 MMHG | DIASTOLIC BLOOD PRESSURE: 89 MMHG | OXYGEN SATURATION: 99 % | HEART RATE: 118 BPM

## 2022-06-28 DIAGNOSIS — H00.012 HORDEOLUM EXTERNUM OF RIGHT LOWER EYELID: ICD-10-CM

## 2022-06-28 DIAGNOSIS — J02.9 SORE THROAT: ICD-10-CM

## 2022-06-28 DIAGNOSIS — R05.3 PERSISTENT COUGH FOR 3 WEEKS OR LONGER: Primary | ICD-10-CM

## 2022-06-28 LAB
DEPRECATED S PYO AG THROAT QL EIA: NEGATIVE
GROUP A STREP BY PCR: NOT DETECTED

## 2022-06-28 PROCEDURE — 99213 OFFICE O/P EST LOW 20 MIN: CPT | Performed by: PHYSICIAN ASSISTANT

## 2022-06-28 PROCEDURE — 87651 STREP A DNA AMP PROBE: CPT | Performed by: PHYSICIAN ASSISTANT

## 2022-06-28 RX ORDER — BENZONATATE 200 MG/1
200 CAPSULE ORAL 3 TIMES DAILY PRN
Qty: 30 CAPSULE | Refills: 0 | Status: SHIPPED | OUTPATIENT
Start: 2022-06-28 | End: 2022-08-10

## 2022-06-28 RX ORDER — ERYTHROMYCIN 5 MG/G
OINTMENT OPHTHALMIC
Qty: 3.5 G | Refills: 0 | Status: SHIPPED | OUTPATIENT
Start: 2022-06-28 | End: 2022-08-10

## 2022-06-28 RX ORDER — AZITHROMYCIN 250 MG/1
TABLET, FILM COATED ORAL
Qty: 6 TABLET | Refills: 0 | Status: SHIPPED | OUTPATIENT
Start: 2022-06-28 | End: 2022-07-03

## 2022-06-28 RX ORDER — OMEGA-3 FATTY ACIDS/FISH OIL 300-1000MG
200 CAPSULE ORAL EVERY 4 HOURS PRN
COMMUNITY
End: 2022-08-10

## 2022-06-28 NOTE — PATIENT INSTRUCTIONS
[facility] Informed Consent for Bariatric Surgery from the clinic was given to the patient, reviewed, signed and sent for scanning.       Philippe Cisneros CMA  [facility]  [place] [facility] [place]  [facility] [place] [address] [address]  Office: [phone]  Fax: [phone]

## 2022-06-28 NOTE — PROGRESS NOTES
Assessment & Plan     Persistent cough for 3 weeks or longer  Ongoing symptoms for more than 3 weeks.  On exam patient is in no respiratory distress.  Vitals are stable.  We have elected to treat today for presumed bacterial bronchitis given length and worsening of symptoms.  Z-Kirill is prescribed today.  Tessalon Perles prescribed as needed for cough.  Follow up if any worsening symptoms. Patient agrees.    - azithromycin (ZITHROMAX) 250 MG tablet  Dispense: 6 tablet; Refill: 0  - benzonatate (TESSALON) 200 MG capsule  Dispense: 30 capsule; Refill: 0    Hordeolum externum of right lower eyelid  Erythromycin ophthalmic ointment is prescribed today.  Continue warm compresses.  Follow-up if symptoms do not improve as anticipated..  Patient agrees.  - erythromycin (ROMYCIN) 5 MG/GM ophthalmic ointment  Dispense: 3.5 g; Refill: 0    Sore throat  Rapid strep is negative today.  Throat culture is pending.  Supportive care measures advised.  We will communicate any positive finding on the throat culture result.  Follow-up if any worsening symptoms.  Patient understands and agrees with the plan.    - Streptococcus A Rapid Screen w/Reflex to PCR - Clinic Collect  - Group A Streptococcus PCR Throat Swab       Return in about 10 days (around 7/8/2022) for Symptoms failing to improve.    Marion Mcfarland PA-C  [facility]     Yokasta is a 37 year old female who presents to clinic today for the following health issues:  Chief Complaint   Patient presents with     Cold Symptoms     Cough, congestion, sore throat, fever 100.5F, SOB x a few weeks      HPI      URI Adult    Onset of symptoms was 3 week(s) ago.  Course of illness is waxing and waning.    Severity moderate  Current and Associated symptoms: LGF today, cough sometimes productive, ST, SOB with coughing fits  Denies CP. No v/d.  Treatment measures tried include Tylenol/Ibuprofen.  Predisposing factors include None.  Patient reports  several negative home covid test      Patient also reports a stye right lower eyelid.  Onset yesterday.  Treatment tried warm compresses.      Review of Systems  Constitutional, HEENT, cardiovascular, pulmonary, GI, , musculoskeletal, neuro, skin, endocrine and psych systems are negative, except as otherwise noted.      Objective    BP (!) 147/89 (BP Location: Right arm, Patient Position: Sitting, Cuff Size: Adult Large)   Pulse 118   Temp 98.7  F (37.1  C) (Oral)   Wt 126.6 kg (279 lb)   SpO2 99%   BMI 49.42 kg/m    Physical Exam   GENERAL: healthy, alert and no distress  Eyes: PERRL, there is a tender erythematous lump noted right lower mid eyelid   HENT: ear canals and TM's normal, mouth without ulcers or lesions, throat is moist and pink  RESP: lungs clear to auscultation - no rales, rhonchi or wheezes  CV: regular rate and rhythm, normal S1 S2  MS: no gross musculoskeletal defects noted, no edema  SKIN: no suspicious lesions or rashes

## 2022-06-30 ENCOUNTER — OFFICE VISIT (OUTPATIENT)
Dept: SURGERY | Facility: CLINIC | Age: 37
End: 2022-06-30
Payer: COMMERCIAL

## 2022-06-30 VITALS — WEIGHT: 275 LBS | BODY MASS INDEX: 48.73 KG/M2 | HEIGHT: 63 IN

## 2022-06-30 DIAGNOSIS — E66.01 OBESITY, CLASS III, BMI 40-49.9 (MORBID OBESITY) (H): Primary | ICD-10-CM

## 2022-06-30 DIAGNOSIS — E11.9 TYPE 2 DIABETES MELLITUS WITHOUT COMPLICATION, WITHOUT LONG-TERM CURRENT USE OF INSULIN (H): ICD-10-CM

## 2022-06-30 PROCEDURE — 99204 OFFICE O/P NEW MOD 45 MIN: CPT | Performed by: SURGERY

## 2022-06-30 NOTE — LETTER
2022         RE: Yokasta Gary  [address] [facility] [address]        Dear Colleague,    Thank you for referring your patient, Yokasta Gary, to the [facility] SURGERY CLINIC AND BARIATRICS CARE [place]. Please see a copy of my visit note below.    HPI: Yokasta Gary is a 37 year old female here today for consideration of metabolic and bariatric surgery. She is referred by Dr. Dennise Pa.  She has struggled with obesity for most of her life, and became interested in bariatric surgery after watching her  go through a gastric bypass procedure last .  She has been very motivated by seeing his success, and hopes to achieve better overall health as well as resolution of her newly diagnosed diabetes.     Her bariatric comorbidities include type II DM.    Bariatric comorbidities not present include GERD, HTN, JOO.      Allergies:Patient has no known allergies.    Past Medical History:   Diagnosis Date     COVID-19     Wilson 2021.     Dyspepsia     rare gaviscon use     Type 2 diabetes mellitus without complication, without long-term current use of insulin (H) 2022    hx of gestational diabetes, A1c now 6.8% 2022 as she enters bariatric surgery program.       Past Surgical History:   Procedure Laterality Date     ANTERIOR CRUCIATE LIGAMENT REPAIR Left       SECTION N/A 10/17/2014    Procedure:  SECTION;  Surgeon: Maureen Chanel MD;  Location: [facility];  Service:       SECTION N/A 9/10/2018    Procedure:  SECTION, REPEAT;  Surgeon: Dorota Guzman MD;  Location: [facility];  Service:        CURRENT MEDS:  Prior to Admission medications    Medication Sig Start Date End Date Taking? Authorizing Provider   azithromycin (ZITHROMAX) 250 MG tablet Take 2 tablets (500 mg) by mouth daily for 1 day, THEN 1 tablet (250 mg) daily for 4 days. 6/28/22 7/3/22  Marion Mcfarland PA-C   benzonatate (TESSALON) 200 MG  capsule Take 1 capsule (200 mg) by mouth 3 times daily as needed for cough 6/28/22   Marion Mcfarland PA-C   Cyanocobalamin (B-12) 1000 MCG SUBL Take 3 days weekly 2/9/22   North Bray MD   erythromycin (ROMYCIN) 5 MG/GM ophthalmic ointment Apply one quarter inch to affected eye 3 times daily x 1 week 6/28/22   Marion Mcfarland PA-C   ibuprofen (ADVIL/MOTRIN) 200 MG capsule Take 200 mg by mouth every 4 hours as needed for fever    Reported, Patient   levonorgestrel (MIRENA, 52 MG,) 20 MCG/24HR IUD 1 each by Intrauterine route once    Reported, Patient   metFORMIN (GLUCOPHAGE) 500 MG tablet Start one tablet with supper for 2-3 weeks then increase if tolerating it to one tablet, twice daily with breakfast and supper. 2/11/22   North Bray MD         Social Connections: Not on file       Family History   Problem Relation Age of Onset     Impaired Fasting Glucose Mother      Diabetes Father         reports that she quit smoking about 12 years ago. She has never used smokeless tobacco. She reports that she does not drink alcohol and does not use drugs.    History   Smoking Status     Former Smoker     Quit date: 8/31/2009   Smokeless Tobacco     Never Used       Review of Systems -  A complete ROS was reviewed and except for what is listed in the HPI above, all others are negative  PSYCHIATRIC: She has undergone a lifestyle assessment and has been deemed a good candidate for bariatric surgery by the psychologist.    There were no vitals taken for this visit.    Wt Readings from Last 4 Encounters:   06/28/22 126.6 kg (279 lb)   05/12/22 124.7 kg (275 lb)   04/14/22 124.7 kg (275 lb)   03/18/22 126.8 kg (279 lb 8 oz)        There is no height or weight on file to calculate BMI.    EXAM:  GENERAL: This is a well-developed 37 year old female who appears her stated age  HEAD & NECK: Grossly normal.  No visible goiter or scars  CARDIAC: Regular rate and rhythm  CHEST/LUNG: Normal respiratory effort  ABDOMEN:  Obese.  No visible hernias or masses appreciated.  LYMPHATIC:  No significant adenopathy visualized.    EXTREMITIES: Grossly normal.  No evidence of chronic venous stasis.    NEUROLOGIC: Focally intact  INTEGUMENT: No open lesions or ulcers appreciated visually  PSYCHIATRIC: Normal affect. She has a good grasp on the nature of her obesity and the treatment options.    LABS:      Assessment/Plan: 37 year old female who is an excellent candidate for bariatric and metabolic surgery.  After a careful conversation with the patient it was decided that a laparoscopic gastric bypass would be her best option.       I went over the surgery in detail with her.  I went over the nature of the operation and some of the potential consequences of the surgery.  I went over the expected hospital course and discussed laparoscopic versus open surgery, understanding that we will plan on doing this laparoscopically with the possibility of having to convert to an open operation.  I went over some of the risks and complications of the operation including, but not limited to, DVT, pulmonary emboli, pneumonia, postoperative bleeding, wound infection, staple line leak, intra-abdominal sepsis, and possible death.  I also went over some of the potential nutritional concerns such as vitamin B-12, iron, vitamin D, vitamin A, calcium and protein deficiencies.  I will also went over the need for lifelong nutritional surveillance.  This includes our regular scheduled follow up of a 1 week post op dietician visit, 2 week post op surgeon visit, an then additional dietician visits at 1 month, 3 months, and 9 months.  They are to follow up with a physician in our program at 6 months and 1 year, and annual thereafter.  The patient understands and wants to proceed with surgery.  We will submit for prior authorization.      Alex Mcgraw MD ,MD  [facility] Department of Surgery      Again, thank you for allowing me to participate in the care of your  patient.        Sincerely,        Alex Mcgraw MD

## 2022-06-30 NOTE — PROGRESS NOTES
HPI: Yokasta Gary is a 37 year old female here today for consideration of metabolic and bariatric surgery. She is referred by Dr. Dennise Pa.  She has struggled with obesity for most of her life, and became interested in bariatric surgery after watching her  go through a gastric bypass procedure last fall.  She has been very motivated by seeing his success, and hopes to achieve better overall health as well as resolution of her newly diagnosed diabetes.     Her bariatric comorbidities include type II DM.    Bariatric comorbidities not present include GERD, HTN, JOO.      Allergies:Patient has no known allergies.    Past Medical History:   Diagnosis Date     COVID-19     [place] 2021.     Dyspepsia     rare gaviscon use     Type 2 diabetes mellitus without complication, without long-term current use of insulin (H) 2022    hx of gestational diabetes, A1c now 6.8% 2022 as she enters bariatric surgery program.       Past Surgical History:   Procedure Laterality Date     ANTERIOR CRUCIATE LIGAMENT REPAIR Left       SECTION N/A 10/17/2014    Procedure:  SECTION;  Surgeon: Maureen Chanel MD;  [facility];  Service:       SECTION N/A 9/10/2018    Procedure:  SECTION, REPEAT;  Surgeon: Dorota Guzman MD;  Location: [facility] OR;  Service:        CURRENT MEDS:  Prior to Admission medications    Medication Sig Start Date End Date Taking? Authorizing Provider   azithromycin (ZITHROMAX) 250 MG tablet Take 2 tablets (500 mg) by mouth daily for 1 day, THEN 1 tablet (250 mg) daily for 4 days. 6/28/22 7/3/22  Marion Mcfarland PA-C   benzonatate (TESSALON) 200 MG capsule Take 1 capsule (200 mg) by mouth 3 times daily as needed for cough 22   Marion Mcfarland PA-C   Cyanocobalamin (B-12) 1000 MCG SUBL Take 3 days weekly 22   North Bray MD   erythromycin (ROMYCIN) 5 MG/GM ophthalmic ointment Apply one quarter inch to  affected eye 3 times daily x 1 week 6/28/22   Marion Mcfarland PA-C   ibuprofen (ADVIL/MOTRIN) 200 MG capsule Take 200 mg by mouth every 4 hours as needed for fever    Reported, Patient   levonorgestrel (MIRENA, 52 MG,) 20 MCG/24HR IUD 1 each by Intrauterine route once    Reported, Patient   metFORMIN (GLUCOPHAGE) 500 MG tablet Start one tablet with supper for 2-3 weeks then increase if tolerating it to one tablet, twice daily with breakfast and supper. 2/11/22   North Bray MD         Social Connections: Not on file       Family History   Problem Relation Age of Onset     Impaired Fasting Glucose Mother      Diabetes Father         reports that she quit smoking about 12 years ago. She has never used smokeless tobacco. She reports that she does not drink alcohol and does not use drugs.    History   Smoking Status     Former Smoker     Quit date: 8/31/2009   Smokeless Tobacco     Never Used       Review of Systems -  A complete ROS was reviewed and except for what is listed in the HPI above, all others are negative  PSYCHIATRIC: She has undergone a lifestyle assessment and has been deemed a good candidate for bariatric surgery by the psychologist.    There were no vitals taken for this visit.    Wt Readings from Last 4 Encounters:   06/28/22 126.6 kg (279 lb)   05/12/22 124.7 kg (275 lb)   04/14/22 124.7 kg (275 lb)   03/18/22 126.8 kg (279 lb 8 oz)        There is no height or weight on file to calculate BMI.    EXAM:  GENERAL: This is a well-developed 37 year old female who appears her stated age  HEAD & NECK: Grossly normal.  No visible goiter or scars  CARDIAC: Regular rate and rhythm  CHEST/LUNG: Normal respiratory effort  ABDOMEN: Obese.  No visible hernias or masses appreciated.  LYMPHATIC:  No significant adenopathy visualized.    EXTREMITIES: Grossly normal.  No evidence of chronic venous stasis.    NEUROLOGIC: Focally intact  INTEGUMENT: No open lesions or ulcers appreciated visually  PSYCHIATRIC:  Normal affect. She has a good grasp on the nature of her obesity and the treatment options.    LABS:      Assessment/Plan: 37 year old female who is an excellent candidate for bariatric and metabolic surgery.  After a careful conversation with the patient it was decided that a laparoscopic gastric bypass would be her best option.       I went over the surgery in detail with her.  I went over the nature of the operation and some of the potential consequences of the surgery.  I went over the expected hospital course and discussed laparoscopic versus open surgery, understanding that we will plan on doing this laparoscopically with the possibility of having to convert to an open operation.  I went over some of the risks and complications of the operation including, but not limited to, DVT, pulmonary emboli, pneumonia, postoperative bleeding, wound infection, staple line leak, intra-abdominal sepsis, and possible death.  I also went over some of the potential nutritional concerns such as vitamin B-12, iron, vitamin D, vitamin A, calcium and protein deficiencies.  I will also went over the need for lifelong nutritional surveillance.  This includes our regular scheduled follow up of a 1 week post op dietician visit, 2 week post op surgeon visit, an then additional dietician visits at 1 month, 3 months, and 9 months.  They are to follow up with a physician in our program at 6 months and 1 year, and annual thereafter.  The patient understands and wants to proceed with surgery.  We will submit for prior authorization.      Alex Mcgraw MD ,MD  [facility] Department of Surgery

## 2022-07-06 ENCOUNTER — PREP FOR PROCEDURE (OUTPATIENT)
Dept: SURGERY | Facility: CLINIC | Age: 37
End: 2022-07-06

## 2022-07-06 ENCOUNTER — DOCUMENTATION ONLY (OUTPATIENT)
Dept: SURGERY | Facility: CLINIC | Age: 37
End: 2022-07-06

## 2022-07-06 DIAGNOSIS — E66.01 MORBID OBESITY (H): Primary | ICD-10-CM

## 2022-07-06 RX ORDER — CEFAZOLIN SODIUM/WATER 3 G/30 ML
3 SYRINGE (ML) INTRAVENOUS
Status: CANCELLED | OUTPATIENT
Start: 2022-08-29

## 2022-07-06 RX ORDER — CEFAZOLIN SODIUM/WATER 3 G/30 ML
3 SYRINGE (ML) INTRAVENOUS SEE ADMIN INSTRUCTIONS
Status: CANCELLED | OUTPATIENT
Start: 2022-08-29

## 2022-07-06 RX ORDER — ENOXAPARIN SODIUM 100 MG/ML
40 INJECTION SUBCUTANEOUS ONCE
Status: CANCELLED | OUTPATIENT
Start: 2022-07-06 | End: 2022-07-06

## 2022-07-06 RX ORDER — GABAPENTIN 100 MG/1
600 CAPSULE ORAL
Status: CANCELLED | OUTPATIENT
Start: 2022-07-06

## 2022-07-06 RX ORDER — ACETAMINOPHEN 325 MG/1
975 TABLET ORAL ONCE
Status: CANCELLED | OUTPATIENT
Start: 2022-08-29 | End: 2022-07-06

## 2022-07-06 RX ORDER — ONDANSETRON 2 MG/ML
4 INJECTION INTRAMUSCULAR; INTRAVENOUS
Status: CANCELLED | OUTPATIENT
Start: 2022-08-29

## 2022-07-06 NOTE — PROGRESS NOTES
I have submitted a PA to [facility] to get approval of a LRNY with Dr. Alex Mcgraw.  Yokasta now sent a message that she would like to do this as OP.  Dr. Mcgraw will still need to put in the surgery order before scheduling.      Had to do over the phone with Jesus ELLER  Was provided fax number [phone] for clinicals    REF # [id number]

## 2022-07-11 ENCOUNTER — DOCUMENTATION ONLY (OUTPATIENT)
Dept: SURGERY | Facility: CLINIC | Age: 37
End: 2022-07-11

## 2022-07-11 NOTE — PROGRESS NOTES
Yokasta is scheduled for a LRNY as AP with Dr. Alex Mcgraw on Monday, August 29, 2022 @ 7:30 am.  Scheduled for pre op class on August 9, 2022.  Scheduled for video post op nutritional education with Ama mUaña on August 30, 2022 @ 9:00 am.  She will have her pre op and testing done at [facility]    [place] #[id number]  For August 29, 2022-August 30, 2022

## 2022-07-14 ENCOUNTER — MYC MEDICAL ADVICE (OUTPATIENT)
Dept: FAMILY MEDICINE | Facility: CLINIC | Age: 37
End: 2022-07-14

## 2022-07-14 NOTE — TELEPHONE ENCOUNTER
Called and spoke to patient. She is currently scheduled on 08/08 for her pre-op with you. She is going to call the surgery center and see if that is ok. Otherwise she will need a pre-op on Aug 10, 11, or 12.   If the 08/08 appt doesn't work. Are you willing to add patient on the end of your day on 08/10? You currently have a 2:40 (20 minute slot) available on 08/10.    Please advise

## 2022-07-14 NOTE — TELEPHONE ENCOUNTER
The appointment she already has scheduled on the eighth should be totally fine.  There really should not be much difference in 2 days but if she prefers to see me on the 10th, we could switch her over just make sure that she comes early please.

## 2022-07-15 NOTE — PROGRESS NOTES
Pre-op Class Checklist:    Hemoglobin A1C   Date Value Ref Range Status   02/04/2022 6.8 (H) 0.0 - 5.6 % Final     Comment:     Normal <5.7%   Prediabetes 5.7-6.4%    Diabetes 6.5% or higher     Note: Adopted from ADA consensus guidelines.      CPAP: No   Smoking Cessation Date: 8/31/2009  Urine Nicotine Screen: No   Consent: Complete - not scanned in yet  Support Group: Yes   Anticoag Risk: Standard   Omeprazole: Need   Actigall: Need   Bariatrician: North Bray MD   Email: [email or web address]  Initial Wt:  280 lbs  AB Visit:    Wt Readings from Last 1 Encounters:   06/30/22 124.7 kg (275 lb)     AP patient    Margarita Blackwell RN, CBN, [facility]  [address] [place] [address], [address]  [email or web address]  Phone: [phone]  Fax:  [phone]

## 2022-07-17 ENCOUNTER — HEALTH MAINTENANCE LETTER (OUTPATIENT)
Age: 37
End: 2022-07-17

## 2022-08-09 ENCOUNTER — ALLIED HEALTH/NURSE VISIT (OUTPATIENT)
Dept: SURGERY | Facility: CLINIC | Age: 37
End: 2022-08-09
Payer: COMMERCIAL

## 2022-08-09 VITALS — BODY MASS INDEX: 49.26 KG/M2 | HEIGHT: 63 IN | WEIGHT: 278 LBS

## 2022-08-09 DIAGNOSIS — R63.4 RAPID WEIGHT LOSS: ICD-10-CM

## 2022-08-09 DIAGNOSIS — Z01.818 PRE-OP TESTING: ICD-10-CM

## 2022-08-09 DIAGNOSIS — Z98.84 S/P BARIATRIC SURGERY: ICD-10-CM

## 2022-08-09 DIAGNOSIS — Z71.89 ENCOUNTER FOR PRE-BARIATRIC SURGERY COUNSELING AND EDUCATION: Primary | ICD-10-CM

## 2022-08-09 DIAGNOSIS — Z98.890 POSTOPERATIVE NAUSEA: ICD-10-CM

## 2022-08-09 DIAGNOSIS — R11.0 POSTOPERATIVE NAUSEA: ICD-10-CM

## 2022-08-09 DIAGNOSIS — K21.9 ACID REFLUX: ICD-10-CM

## 2022-08-09 DIAGNOSIS — E66.01 MORBID OBESITY (H): Primary | ICD-10-CM

## 2022-08-09 DIAGNOSIS — K91.2 POSTOPERATIVE MALABSORPTION: ICD-10-CM

## 2022-08-09 DIAGNOSIS — K22.4 ESOPHAGEAL SPASM: ICD-10-CM

## 2022-08-09 PROCEDURE — 99207 PR PREOP VISIT IN GLOBAL PKG: CPT

## 2022-08-09 RX ORDER — ONDANSETRON 4 MG/1
4 TABLET, ORALLY DISINTEGRATING ORAL EVERY 8 HOURS PRN
Qty: 12 TABLET | Refills: 1 | Status: SHIPPED | OUTPATIENT
Start: 2022-08-29 | End: 2023-04-14

## 2022-08-09 RX ORDER — URSODIOL 300 MG/1
300 CAPSULE ORAL 2 TIMES DAILY
Qty: 180 CAPSULE | Refills: 1 | Status: SHIPPED | OUTPATIENT
Start: 2022-09-12 | End: 2023-03-11

## 2022-08-09 RX ORDER — APREPITANT 40 MG/1
40 CAPSULE ORAL ONCE
Qty: 1 CAPSULE | Refills: 0 | Status: ON HOLD | OUTPATIENT
Start: 2022-08-29 | End: 2022-08-29

## 2022-08-09 RX ORDER — MULTIVIT-MIN/FOLIC/VIT K/LYCOP 400-300MCG
1 TABLET ORAL 2 TIMES DAILY
Qty: 180 TABLET | Refills: 3 | Status: SHIPPED | OUTPATIENT
Start: 2022-08-09

## 2022-08-09 NOTE — PROGRESS NOTES
Orders placed for pre-op testing, medications and vitamins per  and  for upcoming surgery.    Dennise Sweet RN, CBN  [facility]  P [phone]  F [phone]

## 2022-08-09 NOTE — PATIENT INSTRUCTIONS
NEW PRESCRIPTIONS:  In preparation for class and surgery, we have prescribed some medication that you will need to  PRIOR to the pre-operative class and show us that you have them at the class.        the following prescriptions/supplements that will be sent to your pharmacy - [id number] - [place], [place] - [address]     1. Vitamins: Multivitamin and Vitamin B12 (if you weren't taking already) -  You can start taking the Multivitamin and B12 as soon as you pick this up from the pharmacy. Some insurance companies will not cover the vitamins because they are available over the counter, so in those cases you will need to purchase them yourselves.  Do not take the morning of surgery.     2. Acid Reducer:  Omeprazole (Prilosec) - If you are not taking already, you will get a prescription to start when you get home from the hospital.  Tablets cannot be cut or crushed.  If a capsule, entire capsule contents may be sprinkled on a spoonful of applesauce and taken immediately without chewing.  If only on a full liquid diet, dump capsule contents into a spoonful of liquid and take without chewing.  May swallow whole again starting 6 weeks after surgery but can try swallowing sooner if having issues with opening into food.  Continue after surgery for at least 3 months even without symptoms of acid reflux. Do not take the morning of surgery.     3. Anti-Spasm Medication:  Hyoscyamine (Levsin)     4. Anti-Nausea Medication:  Aprepitant (Emend) and Zofran      5. Gallstone Reduction: Actigall (if needed) - Start two weeks after surgery.  Swallow whole with warm water, do not cut, crush, or open capsule up.  This is a medication that will help to prevent gallstones from forming during the first 6 months post-op of rapid weight loss.        HOME MEDICATION RECOMMENDATIONS:  Below you will see your specific medication instructions. Please share this information with your primary care so they can make  adjustments to your medications if necessary.     Benzonatate (Brand Name: Tessalon)    Swallow the capsules whole; do not crush or chew. Chewing the capsule may numb your mouth and throat and cause you to choke.      Erythromycin Ointment  Ok to use.     Ibuprofen ((Brand Name: Advil, Motrin)    Stop 7 days before date of surgery. Avoid completely after surgery.      Levonorgestrel (Mirena IUD)   Okay to leave in and continue use. If you happen to have removed, reliable contraception is required post-operatively for 1 year for women of childbearing age.     Metformin (Brand Name: Glucophage)    Ok to cut or crush immediate release tablet. Stop two days prior to surgery (last dose 8/27/2022).     Vitamin B12   Continue your vitamin B12 up until surgery. Do not take the morning of surgery.  After surgery it will need to be 1000mcg daily sublingual (under the tongue) daily, or injections monthly.        MORNING OF SURGERY:     Medication to take the morning of surgery with a sip of water before leaving for the hospital:      -Aprepitant 40 mg - this is a one time dose that you will only take before you leave for the hospital.        AFTER SURGERY:     Here is a simple graph that might help makes things more clear for when to start certain medications after surgery.     Medication Dose When to Start   Vitamin B12  1000 mcg Once a day under the tongue (sublingual), weekly nasal spray, or monthly injections Day after surgery   Chewable Multivitamin with   18 mg Iron  (Must also contain Vitamin A and Zinc)  NO GUMMIES 1 tablet twice daily Day after surgery   Omeprazole  20 mg 1 capsule daily - open and sprinkle on food or swallow with fluid Day after surgery. Take even if no acid reflux symptoms.   Zofran 4 mg    4mg tablet every 8 hours as needed As needed after surgery   Tylenol 1000 mg every 6 hours for three days after surgery.  After first three days after surgery, switch to as needed and do not take more than 3000mg  daily Once you get home   (you will be sent home from the hospital with this)   Hyoscyamine    0.125 mg tablet every 4 hours as needed for stomach spasm pain As needed after surgery   Gabapentin 250 mg liquid 3 times a day for at least 3 days after surgery Once you get home   (you will be sent home from the hospital with this)   Actigall (Ursodiol)- 300 mg for gallbladder if you still have Twice daily - swallow whole with warm water Two weeks after surgery   Chewable Calcium Citrate 2 tablets twice daily Three weeks after surgery   Vitamin D - (125 mcg)  5000 units 1 daily Three weeks after surgery         VITAMINS:     1st 3 Months after Surgery  Choose chewable, liquid or crushable forms   of Multivitamin and Calcium Citrate     1.)   Sublingual Vitamin B12: Take 6896-4552 mcg 1 time daily                    Also available in injectable form monthly.  Discuss with provider if interested.  2.)   Multivitamin with Iron: Take 1 multivitamin 2 times daily  Needs 18 mg of IRON per dose along with Vitamin A and Zinc in them  Generic Children's Chewable multivitamin with iron (Equate, Up & Up brand, etc.)  Centrum   Chewable  Centrum, Women's One-A-Day or Generic (after 3 months)  NO GUMMY Vitamins (these do not contain iron)  3.)   Calcium Citrate with Vitamin D: Take 400-600 mg 2 times daily (Start 3 weeks after surgery)  Bariatric Advantage: Citrate Lozenges (500mg)--2 Daily, or Chewy Bites (250 mg)--4 Daily  www.[employer]Synthox  or 1-143.500.7822  Celebrate Calcium: Calcium Plus 500 (500mg)--2 Daily, or Calcet Creamy Bites (500 mg)--2 Daily, or Calcium Citrate Chews (250mg) - 4 Daily   www.Identias.Synthox  or 1-339.518.5747  UNJURY Opurity: Calcium Citrate Plus (300mg)--3 Daily  www.[employer]  or 1-722.828.2235  Up-Steve D: Nutrition Direct: Flavorless Calcium Powder (500 mg with 250 IU Vitamin D)  www.amazon.com  or 1-123.881.4143--3 Scoops Daily  Wellesse Liquid Calcium Citrate--1 Tbsp.  (500 mg)--2  Times Daily  Cortera  After 3 months post op:  Citracal Petites (or Generic version) (200mg) - 4 daily  Any grocery store or Pharmacy  4.)   Vitamin D3 : Take 5000 IU Daily (Start 3 weeks after surgery)  This can remain a small gel cap           General Medication Concerns Before  and After Bariatric Surgery     BEFORE Surgery  Certain medications may need to be stopped before major surgery. Some medications may increase your risk of bleeding, others may change the way your blood clots, and other medications can affect your lab work. The bariatric clinic will give you specific instructions about when to stop these medications.     This timeline shows when certain medications should be stopped before surgery: This is a general timeline, if your bariatric nurse or surgeon gives you other instructions, follow those specific instructions.     30 Days (One Month) Before Surgery  Birth control pills & patches that contain estrogen  You must use alternative forms of contraception  Hormone replacement therapy   Premarin  Testosterone  14 Days (Two Weeks) Before Surgery  Appetite control medications   Phentermine  Other: __________  Diuretics ( water pills )   Talk to your primary doctor.  You may need an alternative medication,  Hydrochlorothiazide (HCTZ)  Furosemide (Lasix )  Bumetanide (Bumex )  Spironolactone (Aldactone )  Chlorthalidone  Any blood pressure medication that is combined with a diuretic  Herbal supplements  Fish oil or Omega 3        Homeopathic remedies  7 Days (One Week) Before Surgery  Anti-platelet medications and medications that help to prevent blood clots:  Clopidogrel (Plavix ), Prasugrel (Effient ), Ticagrelor (Brilinta )  Aspirin/Dipyridamole (Aggrenox ), Dipyridamole (Persantine )  Cilostazol (Pletal )  All Non-Steroidal Anti-Inflammatory Drugs (NSAIDs):   Non-prescription: Ibuprofen (Advil , Motrin , Nuprin ), Naproxen (Naprosyn , Aleve , Anaprox ),   Prescription: Piroxicam  (Feldene ), Sulindac (Clinoril ), Tolmentin (Tolectin ), Celecoxib (Celebrex ), Diclofenac (Voltaren ), Indomethacin (Indocin ), Nambumetone (Relafen ), Flurbiprofen (Ansaid ), Ketoprofen (Orudis ), Etodolac (Lodine ), Meloxicam (Mobic ), Oxaprozin (Daypro )  Aspirin (including low-dose (81mg) and chewable  baby aspirin )  Warfarin (Coumadin , Jantoven )  When warfarin is restarted (usually 24-48 hrs after surgery), dosing and INR monitoring will be managed by the Bariatric Clinic for 4-6 weeks after your surgery date.   The Day Before Surgery  Diabetes medications: Follow the instructions on the  Diabetes Management for the Bariatric Surgery Patient  form.  The Day of Surgery  Diabetes medications: Follow the instructions on the  Diabetes Management for the Bariatric Surgery Patient  form.            AFTER Surgery     The 3 main ideas:  Cut or crush all meds for 6 weeks after surgery  Long acting medications are not the best option anymore  Avoid NSAID medications for the rest of your life     1.  Cutting or crushing meds:  Before surgery, tablet size does not matter.  After surgery there will be swelling around your new stomach pouch or sleeve.  Therefore, it is important to limit the size of medications for the first six weeks after surgery.       What this means for you:  For the first six weeks after surgery, you will need to cut or crush tablets that are larger than   inch (about the size of an eraser on a standard pencil)  Some capsules may be opened and the contents sprinkled onto soft food.  Once sprinkled on food, eat immediately being careful not to chew.    You will be given a pill cutter at the hospital  Refer to your medication list. This list will tell you which medications can be cut or crushed, and which capsules can be opened.      * Important Reminder: Discuss ALL of your medications with your primary care provider.    Your pre-op history and physical appointment is a good time to review your  current medications.      2.  Long acting medications are not the best option anymore  Many types of bariatric surgery involve removing a portion of the small intestine. Long acting or extended release medications release slowly throughout the entire small intestine. Because your surgery has changed your stomach and/or intestine, you may not get the full effect of the long acting medication.  Short acting medications may work better for you after surgery. Talk with your doctor at your pre-op history and physical appointment if you are taking long acting medications. Your doctor can change prescriptions for long acting medications to short acting.   3.  Avoid NSAIDs for the rest of your life  NSAIDs are Non-Steroidal Anti-Inflammatory Drugs  The NSAID class of medications is used to relieve minor aches, pains or to treat fever. They are commonly used to treat pain caused by a cold, flu, sore throat, headache, and arthritis.  Some NSAIDs are available over-the-counter while others need a prescription from your doctor.      NSAIDs should be avoided after bariatric surgery because they can cause stomach ulcers, scarring or bleeding.  You will not be able to take any NSAID mediation for the rest of your life after bariatric surgery.  Below is a list of common NSAID medications:     OVER-THE-COUNTER NSAIDs    Ibuprofen  - Brand names Advil   , Motrin  , Nuprin     Naproxen - Brand names Aleve  , Naprosyn  , Anaprox      PRESCRIPTION NSAIDs   Celebrex   (Celecoxib)                                       Orudis   (Ketoprofen)  Mobic   (Meloxicam)                                           Lodine   (Etodolac)  Voltaren  (Diclofenac)                                        Ansaid   (Flurbiprofen)  Relafen   Nabumetone                                       Clinoril   (Sulindac)  Feldene   (Piroxicam)                                         Tolectin   Tolmentin  Indocin   (Indomethacin)                                    Daypro   (Oxaprozin)         After surgery, the only safe non-prescription pain reliever is acetaminophen (Tylenol ).  Acetaminophen is available in 325mg and 500mg tablets.  If acetaminophen does not control your pain, call your primary care provider to discuss other options.     4. Other medications you may have to avoid  Aspirin  Do not use aspirin for headaches, body aches, or muscle aches after bariatric surgery.  This is because aspirin can also cause stomach ulcers.  However, your primary care provider may tell you to take aspirin for certain conditions.  A maximum of 81mg of enteric coated aspirin (ex: Ecotrin ) once daily is usually okay to take if directed to take aspirin by your primary care doctor.   Be sure to take with food or milk.     Alendronate (Fosamax ), risedronate (Actonel ) (risedronate), ibandronate (Boniva ):  These are common osteoporosis medications that can cause erosions or ulcers in the esophagus and stomach.   Remind your primary care provider that you have had bariatric surgery and discuss other medication options.     Diuretics ( water pills )  These medications are used to treat high blood pressure.  They can also reduce swelling and water retention caused by various medical conditions.  Diuretics should not be used for patients who are having weight loss surgery.  Your diuretic will not be restarted immediately after surgery.  This is because it is often difficult to drink enough fluids after surgery to keep up with the fluid loss caused by the diuretic/ water pill .  Call your primary care provider to discuss alternative medications to treat high blood pressure.       5. Medications for chronic conditions  It is likely that the need for some of your medications will change after weight loss surgery.     High Blood Pressure Medications   As you lose weight, your blood pressure may change. Talk with your primary care provider about how to manage your high blood pressure after surgery. You  may need the dose of your blood pressure medication(s) adjusted.  Keep track of your blood pressure, and call your primary doctor if you have low blood pressure symptoms, such as: dizziness, faintness, weakness, light-headedness (especially when going from sitting to standing)     Diabetes Medications  As you lose weight, your blood sugars may change. Talk with your primary care provider about how to monitor and manage your diabetes after surgery. You may need the dose of your diabetes medication(s) adjusted.  Call your primary doctor if you have any of these symptoms.  Keep track of your blood sugars, and call your primary doctor if you have low blood sugar symptoms, such as: sweating, shaking, nervousness, headache, light-headedness, dizziness, weakness, or fainting  6. Pain Medications  Treating pain and discomfort is important to your recovery.  Your surgeon will order pain medication for you in the hospital.  You will also get a take home prescription for pain medication after surgery.  Our goal is not pain free, but an acceptable level of discomfort; you should be able to move without pain limiting your activity     In the Preoperative area, you will receive additional pain treatment              Erector Spinae Block - which is an ultrasound guided injection for numbing medication              Ketorolac (Toradol )  This is an anti-inflammatory medication used to treat swelling and moderate pain  It is used only for 24 hours after surgery to decrease inflammation and pain  Given through your IV  Takes about 30 minutes to take effect  Common side effects: nausea, upset stomach  If your pain is not well controlled with this, you may be given a narcotic pain medication in addition to ketorolac     In Recovery, you will receive additional IV pain medication as needed and then you will be switched over to things you can take by mouth in preparation for going home.                Fentanyl              Hydromorphone  (Dilaudid )                          Common side effects: sleepiness, dizziness, upset stomach, constipation   Narcotic medications can alter your judgment, coordination and reaction time.  Do NOT drive or do anything that requires clear thinking and coordination until you have been completely off narcotic medications for at least 24 hours.         Use only to treat moderate to severe pain.  Ultram (Tramadol)  This is a non-narcotic prescription pain medication used for moderate to severe pain if needed.  Immediate release tablets can be cut or crushed.  Tramadol can cause or worsen seizures. Your risk of seizures is higher if you're taking other certain medications. These drugs include other opioid pain drugs or certain medications for depression, other mood disorders, or psychosis.  Tramadol oral tablet may cause drowsiness. You should not drive, use heavy machinery, or perform any dangerous activities until you know how this drug affects you.  Acetaminophen (Tylenol)   This will be giving by IV to start and then by mouth once you are ready  Use for mild pain or discomfort  Available without a prescription and comes in liquid, tablets, capsules and powder.  The adult strength powder packs can be helpful right after surgery and can be found online.  Maximum daily dosage: 3,000mg per 24 hours  Works best as a scheduled dose for the first three days once you get home in addition to the gabapentin as the inflammation goes down  Gabapentin  Used together with the acetaminophen to provide pain relief.  Can be taken every 8 hours as needed  The medication works by calming the nerve cells that transmit pain signals to the brain.  This is best in liquid form right after surgery but can have a unpleasant taste.     * Important Reminder: Do NOT take NSAID or aspirin medications that are available without a prescription (examples: Ibuprofen, Motrin , Advil , Aleve , Naproxen)     7. Other information     Medication  Absorption  Some medications may not be absorbed as well after bariatric surgery.  Watch for changes in symptoms.  It is important to discuss your medications with your doctor if you think your medications are not working as well as they were before.  You may need to have medication doses adjusted.     Ursodiol (Actigall )  With rapid weight loss, your risk of gallstones increases.  If your still have your gallbladder after surgery, you will be given a prescription for Actigall  that you should begin taking 2 weeks after surgery.  Actigall  will help prevent gallstones from forming.  If you are prescribed this medication, you will usually take it for six months. We encourage you to take this tablet or capsule whole with warm or hot liquid to help it dissolve before it reaches your stomach pouch.  This is medication is bigger than the   inch size restriction, but we will not have you cut/crush it due to it's unpleasant metallic taste. A pharmacist will speak with you more about this medication if it is ordered after surgery.    Common Side Effects: constipation, diarrhea, upset stomach, nausea, dizziness     Non-prescription medications:  You may need medication for seasonal allergies, colds, headaches, or minor aches and pains.  It is important to read the package label carefully.  Below are some helpful hints for choosing the right medication:   Look at the active ingredient(s) list to be sure the medication does not contain caffeine, NSAIDs or aspirin (maximum aspirin dose: 81 mg daily).  Avoid long-acting medication options.  Immediate release medications may work better because they are absorbed better.  It is okay to use liquid medications with the following cautions:  Watch the sugar concentration.  High sugar content in medications could cause dumping syndrome.  Diluting the liquid medication with an equal amount of water will help prevent dumping syndrome.  Do not use products that contain high fructose corn  syrup, corn syrup, sugar, or sorbitol.  Look for sugar-free products as an alternative.  Chewable tablets or tablets that dissolve on your tongue ( oral-dissolving tablet ) are generally safe to use.        Supplements  Refer to the Vitamins & Supplements Handout provided to you by the Bariatric Dietitian for recommended doses and products.

## 2022-08-09 NOTE — PROGRESS NOTES
Patient attended virtual group class to review pre-op instructions for upcoming surgery.    Discussed admission process, COVID restrictions and hospital course.  Pharmacy information packet given and explained. Patient was given exercises to work on post-op for maintaining muscle mass and strengthening.  Bariatric quiz reviewed in class. Discharge instructions and follow up appointments given and reviewed with pt at this time and patient verbalized understanding. She will have her H&P at [facility] on 8/10/2022 with  and do her pre-op testing at that visit.  Prescriptions for Omeprazole and Actigall as well as vitamins and post op medications sent to the patient's pharmacy to be started after surgery.      Dennise Sweet RN, CBN  [facility]  P [phone]  F [phone]      Pt attended the pre-surgery class for bariatric surgery class and was educated on the pre and post surgery liquid diets. Patient received information via [employer] for the pre-op liquid diet and the post-op liquid diet. Discussed appropriate liquids and discussed portions. Reviewed appropriate calories, protein, and fluid goals for each stage before and after surgery. Educated on correct vitamins/minerals, dosage, and frequency to take after surgery. Provided grocery list and sample menu plan for each diet stage.      Pt will begin pre-op liquid diet 8/15/2022 and will do clear liquids the day before surgery. Pt is scheduled for [facility] on 8/29/2022 and will then follow 1 week post-op liquid diet. Pt will follow up with RD 1-week post-op for education on the pureed and soft/regular diet stages.    Kate Bocanegra, CLAIRE on 8/9/2022 at 1:21 PM

## 2022-08-10 ENCOUNTER — OFFICE VISIT (OUTPATIENT)
Dept: FAMILY MEDICINE | Facility: CLINIC | Age: 37
End: 2022-08-10
Payer: COMMERCIAL

## 2022-08-10 VITALS
HEART RATE: 87 BPM | SYSTOLIC BLOOD PRESSURE: 145 MMHG | BODY MASS INDEX: 49.08 KG/M2 | RESPIRATION RATE: 16 BRPM | HEIGHT: 63 IN | WEIGHT: 277 LBS | DIASTOLIC BLOOD PRESSURE: 102 MMHG

## 2022-08-10 DIAGNOSIS — Z01.818 PREOP GENERAL PHYSICAL EXAM: Primary | ICD-10-CM

## 2022-08-10 DIAGNOSIS — E11.9 TYPE 2 DIABETES MELLITUS WITHOUT COMPLICATION, WITHOUT LONG-TERM CURRENT USE OF INSULIN (H): ICD-10-CM

## 2022-08-10 DIAGNOSIS — Z01.818 PRE-OP TESTING: ICD-10-CM

## 2022-08-10 DIAGNOSIS — E66.01 MORBID OBESITY (H): ICD-10-CM

## 2022-08-10 PROBLEM — O24.414 INSULIN CONTROLLED GESTATIONAL DIABETES MELLITUS (GDM) IN SECOND TRIMESTER: Status: RESOLVED | Noted: 2018-06-27 | Resolved: 2022-08-10

## 2022-08-10 LAB
ALBUMIN SERPL BCG-MCNC: 4.4 G/DL (ref 3.5–5.2)
ALBUMIN UR-MCNC: NEGATIVE MG/DL
ALP SERPL-CCNC: 77 U/L (ref 35–104)
ALT SERPL W P-5'-P-CCNC: 42 U/L (ref 10–35)
ANION GAP SERPL CALCULATED.3IONS-SCNC: 10 MMOL/L (ref 7–15)
APPEARANCE UR: CLEAR
APTT PPP: 31 SECONDS (ref 22–38)
AST SERPL W P-5'-P-CCNC: 31 U/L (ref 10–35)
ATRIAL RATE - MUSE: 83 BPM
BACTERIA #/AREA URNS HPF: ABNORMAL /HPF
BASOPHILS # BLD AUTO: 0 10E3/UL (ref 0–0.2)
BASOPHILS NFR BLD AUTO: 0 %
BILIRUB SERPL-MCNC: 0.8 MG/DL
BILIRUB UR QL STRIP: NEGATIVE
BUN SERPL-MCNC: 8.3 MG/DL (ref 6–20)
CALCIUM SERPL-MCNC: 9.1 MG/DL (ref 8.6–10)
CHLORIDE SERPL-SCNC: 101 MMOL/L (ref 98–107)
COLOR UR AUTO: YELLOW
CREAT SERPL-MCNC: 0.7 MG/DL (ref 0.51–0.95)
DEPRECATED HCO3 PLAS-SCNC: 26 MMOL/L (ref 22–29)
DIASTOLIC BLOOD PRESSURE - MUSE: NORMAL MMHG
EOSINOPHIL # BLD AUTO: 0.2 10E3/UL (ref 0–0.7)
EOSINOPHIL NFR BLD AUTO: 2 %
ERYTHROCYTE [DISTWIDTH] IN BLOOD BY AUTOMATED COUNT: 11.8 % (ref 10–15)
GFR SERPL CREATININE-BSD FRML MDRD: >90 ML/MIN/1.73M2
GLUCOSE SERPL-MCNC: 106 MG/DL (ref 70–99)
GLUCOSE UR STRIP-MCNC: NEGATIVE MG/DL
HBA1C MFR BLD: 6.9 % (ref 0–5.6)
HCT VFR BLD AUTO: 42.7 % (ref 35–47)
HGB BLD-MCNC: 14.8 G/DL (ref 11.7–15.7)
HGB UR QL STRIP: ABNORMAL
IMM GRANULOCYTES # BLD: 0 10E3/UL
IMM GRANULOCYTES NFR BLD: 0 %
INR PPP: 1.1 (ref 0.85–1.15)
INTERPRETATION ECG - MUSE: NORMAL
KETONES UR STRIP-MCNC: NEGATIVE MG/DL
LEUKOCYTE ESTERASE UR QL STRIP: ABNORMAL
LYMPHOCYTES # BLD AUTO: 2 10E3/UL (ref 0.8–5.3)
LYMPHOCYTES NFR BLD AUTO: 25 %
MCH RBC QN AUTO: 28.2 PG (ref 26.5–33)
MCHC RBC AUTO-ENTMCNC: 34.7 G/DL (ref 31.5–36.5)
MCV RBC AUTO: 81 FL (ref 78–100)
MONOCYTES # BLD AUTO: 0.5 10E3/UL (ref 0–1.3)
MONOCYTES NFR BLD AUTO: 6 %
NEUTROPHILS # BLD AUTO: 5.6 10E3/UL (ref 1.6–8.3)
NEUTROPHILS NFR BLD AUTO: 68 %
NITRATE UR QL: NEGATIVE
P AXIS - MUSE: 24 DEGREES
PH UR STRIP: 5.5 [PH] (ref 5–8)
PLATELET # BLD AUTO: 237 10E3/UL (ref 150–450)
POTASSIUM SERPL-SCNC: 4.1 MMOL/L (ref 3.4–5.3)
PR INTERVAL - MUSE: 144 MS
PROT SERPL-MCNC: 6.8 G/DL (ref 6.4–8.3)
QRS DURATION - MUSE: 82 MS
QT - MUSE: 358 MS
QTC - MUSE: 420 MS
R AXIS - MUSE: 65 DEGREES
RBC # BLD AUTO: 5.25 10E6/UL (ref 3.8–5.2)
RBC #/AREA URNS AUTO: ABNORMAL /HPF
SODIUM SERPL-SCNC: 137 MMOL/L (ref 136–145)
SP GR UR STRIP: 1.02 (ref 1–1.03)
SQUAMOUS #/AREA URNS AUTO: ABNORMAL /LPF
SYSTOLIC BLOOD PRESSURE - MUSE: NORMAL MMHG
T AXIS - MUSE: 58 DEGREES
UROBILINOGEN UR STRIP-ACNC: 0.2 E.U./DL
VENTRICULAR RATE- MUSE: 83 BPM
WBC # BLD AUTO: 8.3 10E3/UL (ref 4–11)
WBC #/AREA URNS AUTO: ABNORMAL /HPF

## 2022-08-10 PROCEDURE — 83036 HEMOGLOBIN GLYCOSYLATED A1C: CPT | Performed by: FAMILY MEDICINE

## 2022-08-10 PROCEDURE — 80053 COMPREHEN METABOLIC PANEL: CPT | Performed by: FAMILY MEDICINE

## 2022-08-10 PROCEDURE — 93005 ELECTROCARDIOGRAM TRACING: CPT | Performed by: FAMILY MEDICINE

## 2022-08-10 PROCEDURE — 85025 COMPLETE CBC W/AUTO DIFF WBC: CPT | Performed by: FAMILY MEDICINE

## 2022-08-10 PROCEDURE — 85730 THROMBOPLASTIN TIME PARTIAL: CPT | Performed by: FAMILY MEDICINE

## 2022-08-10 PROCEDURE — 36415 COLL VENOUS BLD VENIPUNCTURE: CPT | Performed by: FAMILY MEDICINE

## 2022-08-10 PROCEDURE — 93010 ELECTROCARDIOGRAM REPORT: CPT | Performed by: INTERNAL MEDICINE

## 2022-08-10 PROCEDURE — 87086 URINE CULTURE/COLONY COUNT: CPT | Performed by: FAMILY MEDICINE

## 2022-08-10 PROCEDURE — 99214 OFFICE O/P EST MOD 30 MIN: CPT | Performed by: FAMILY MEDICINE

## 2022-08-10 PROCEDURE — 85610 PROTHROMBIN TIME: CPT | Performed by: FAMILY MEDICINE

## 2022-08-10 PROCEDURE — 81001 URINALYSIS AUTO W/SCOPE: CPT | Performed by: FAMILY MEDICINE

## 2022-08-10 NOTE — H&P (VIEW-ONLY)
[facility]  [address] [facility] [address]  Phone: [phone]  Fax: [phone]  Primary Provider: Dennise Lepe  Pre-op Performing Provider: DENNISE LEPE      PREOPERATIVE EVALUATION:  Today's date: 8/10/2022    Yokasta Gary is a 37 year old female who presents for a preoperative evaluation.    Surgical Information:  Surgery/Procedure: Gastric bypass    Surgery Location: [facility]  Surgeon: Dr. JUAN Mcgraw  Surgery Date: 8/29/22  Time of Surgery: TBD  Where patient plans to recover: At home with family  Fax number for surgical facility: Note does not need to be faxed, will be available electronically in Epic.    Type of Anesthesia Anticipated: General  1. Preop general physical exam  Yokasta is approved for surgery with general anesthesia.  Her EKG is normal today.  Blood work was drawn.  She does not need a COVID test as she tested positive on 7/29/2022 and has now fully recovered.  She will return tomorrow for her chest x-ray as we did not have an x-ray tech in clinic today.    2. Pre-op testing  - UA with Microscopic reflex to Culture  - Partial thromboplastin time  - INR  - Hemoglobin A1c  - EKG 12-lead complete w/read - Clinics  - Comprehensive metabolic panel  - CBC with Platelets & Differential  - EKG 12-lead, tracing only    3. Morbid obesity (H)  - UA with Microscopic reflex to Culture  - Partial thromboplastin time  - INR  - Hemoglobin A1c  - EKG 12-lead complete w/read - Clinics  - Comprehensive metabolic panel  - CBC with Platelets & Differential    4. Type 2 diabetes mellitus without complication, without long-term current use of insulin (H)      Subjective     HPI related to upcoming procedure: Yokasta presents for preop physical today.  She is having a Lupe-en-Y gastric bypass.  Her  recently had this done and so she feels like she is well prepared for what to expect.  She is gone through the process through [facility]  She has no known coronary artery disease and denies chest pain or shortness of breath.  She does have type 2 diabetes which was recently diagnosed and is taking metformin.  She has started her preop medications.  She has had no recent illnesses or exposure other than COVID on 7/29 which she feels like she is completely recovered from.    Preop Questions 8/9/2022   1. Have you ever had a heart attack or stroke? No   2. Have you ever had surgery on your heart or blood vessels, such as a stent placement, a coronary artery bypass, or surgery on an artery in your head, neck, heart, or legs? No   3. Do you have chest pain with activity? No   4. Do you have a history of  heart failure? No   5. Do you currently have a cold, bronchitis or symptoms of other infection? No   6. Do you have a cough, shortness of breath, or wheezing? No   7. Do you or anyone in your family have previous history of blood clots? No   8. Do you or does anyone in your family have a serious bleeding problem such as prolonged bleeding following surgeries or cuts? No   9. Have you ever had problems with anemia or been told to take iron pills? No   10. Have you had any abnormal blood loss such as black, tarry or bloody stools, or abnormal vaginal bleeding? No   11. Have you ever had a blood transfusion? No   12. Are you willing to have a blood transfusion if it is medically needed before, during, or after your surgery? Yes   13. Have you or any of your relatives ever had problems with anesthesia? No   14. Do you have sleep apnea, excessive snoring or daytime drowsiness? No   15. Do you have any artifical heart valves or other implanted medical devices like a pacemaker, defibrillator, or continuous glucose monitor? No   16. Do you have artificial joints? No   17. Are you allergic to latex? No   18. Is there any chance that you may be pregnant? No       Health Care Directive:  Patient does not have a Health Care Directive or Living Will:  Discussed advance care planning with patient; information given to patient to review.    Preoperative Review of :   reviewed - no record of controlled substances prescribed.      Status of Chronic Conditions:  See problem list for active medical problems.  Problems all longstanding and stable, except as noted/documented.  See ROS for pertinent symptoms related to these conditions.      Review of Systems  CONSTITUTIONAL: NEGATIVE for fever, chills, change in weight  INTEGUMENTARY/SKIN: NEGATIVE for worrisome rashes, moles or lesions  EYES: NEGATIVE for vision changes or irritation  ENT/MOUTH: NEGATIVE for ear, mouth and throat problems  RESP: NEGATIVE for significant cough or SOB  CV: NEGATIVE for chest pain, palpitations or peripheral edema  GI: NEGATIVE for nausea, abdominal pain, heartburn, or change in bowel habits  : NEGATIVE for frequency, dysuria, or hematuria  MUSCULOSKELETAL: NEGATIVE for significant arthralgias or myalgia  NEURO: NEGATIVE for weakness, dizziness or paresthesias  ENDOCRINE: NEGATIVE for temperature intolerance, skin/hair changes  HEME: NEGATIVE for bleeding problems  PSYCHIATRIC: NEGATIVE for changes in mood or affect    Patient Active Problem List    Diagnosis Date Noted     Type 2 diabetes mellitus without complication, without long-term current use of insulin (H) 2022     Priority: Medium     Morbid obesity (H) 2022     Priority: Medium      Past Medical History:   Diagnosis Date     COVID-19     Marita 2021.     Dyspepsia     rare gaviscon use     Insulin controlled gestational diabetes mellitus (GDM) in second trimester 2018     Type 2 diabetes mellitus without complication, without long-term current use of insulin (H) 2022    hx of gestational diabetes, A1c now 6.8% 2022 as she enters bariatric surgery program.     Past Surgical History:   Procedure Laterality Date     ANTERIOR CRUCIATE LIGAMENT REPAIR Left       SECTION N/A 10/17/2014     Procedure:  SECTION;  Surgeon: Maureen Chanel MD;  Location: [facility];  Service:       SECTION N/A 9/10/2018    Procedure:  SECTION, REPEAT;  Surgeon: Dorota Guzman MD;  Location: [facility];  Service:      Current Outpatient Medications   Medication Sig Dispense Refill     [START ON 2022] aprepitant (EMEND) 40 MG capsule Take 1 capsule (40 mg) by mouth once for 1 dose Take on the morning of surgery, prior to arrival at surgery center. 1 capsule 0     Cyanocobalamin (B-12) 1000 MCG SUBL Take 3 days weekly 150 tablet 1     [START ON 2022] hyoscyamine (LEVSIN/SL) 0.125 MG sublingual tablet Place 1 tablet (0.125 mg) under the tongue every 4 hours as needed for cramping 30 tablet 1     levonorgestrel (MIRENA, 52 MG,) 20 MCG/24HR IUD 1 each by Intrauterine route once       metFORMIN (GLUCOPHAGE) 500 MG tablet Start one tablet with supper for 2-3 weeks then increase if tolerating it to one tablet, twice daily with breakfast and supper. 60 tablet 0     [START ON 2022] omeprazole (PRILOSEC) 20 MG DR capsule Take 1 capsule (20 mg) by mouth daily for 90 days Start day after surgery, open contents and sprinkle on food for first 6 weeks. Take daily for 3 months after surgery. 90 capsule 0     [START ON 2022] ondansetron (ZOFRAN ODT) 4 MG ODT tab Take 1 tablet (4 mg) by mouth every 8 hours as needed for nausea 12 tablet 1     Pediatric Multivitamins-Iron (MULTIVITAMINS PLUS IRON CHILD) 18 MG CHEW Take 1 chew tab by mouth 2 times daily Ok to substitute with any chewable that contains 18 mg of iron, Vitamin A, Thiamine and Zinc. 180 tablet 3     [START ON 2022] ursodiol (ACTIGALL) 300 MG capsule Take 1 capsule (300 mg) by mouth 2 times daily for 180 days Start 2 weeks after surgery, do not open-take with warm liquid. Take twice a day for 6 months. 180 capsule 1       No Known Allergies     Social History     Tobacco Use     Smoking status: Former Smoker  "    Quit date: 2009     Years since quittin.9     Smokeless tobacco: Never Used   Substance Use Topics     Alcohol use: No       History   Drug Use No         Objective     BP (!) 145/102   Pulse 87   Resp 16   Ht 1.6 m (5' 2.99\")   Wt 125.6 kg (277 lb)   LMP  (LMP Unknown)   BMI 49.08 kg/m      Physical Exam    GENERAL APPEARANCE: healthy, alert and no distress     EYES: EOMI, PERRL     HENT: ear canals and TM's normal and nose and mouth without ulcers or lesions     NECK: no adenopathy, no asymmetry, masses, or scars and thyroid normal to palpation     RESP: lungs clear to auscultation - no rales, rhonchi or wheezes     CV: regular rates and rhythm, normal S1 S2, no S3 or S4 and no murmur, click or rub     ABDOMEN:  soft, nontender, no HSM or masses and bowel sounds normal     MS: extremities normal- no gross deformities noted, no evidence of inflammation in joints, FROM in all extremities.     SKIN: no suspicious lesions or rashes     NEURO: Normal strength and tone, sensory exam grossly normal, mentation intact and speech normal     PSYCH: mentation appears normal. and affect normal/bright     LYMPHATICS: No cervical adenopathy    Recent Labs   Lab Test 22  1125   HGB 15.0         POTASSIUM 3.9   CR 0.75   A1C 6.8*        Diagnostics:  Labs pending at this time.  Results will be reviewed when available.   EKG: appears normal, NSR, normal axis, normal intervals, no acute ST/T changes c/w ischemia, no LVH by voltage criteria, unchanged from previous tracings  Chest x-ray: Pending at this time.  Revised Cardiac Risk Index (RCRI):  The patient has the following serious cardiovascular risks for perioperative complications:   - No serious cardiac risks = 0 points     RCRI Interpretation: 0 points: Class I (very low risk - 0.4% complication rate)           Signed Electronically by: Dennise Ruff  Copy of this evaluation report is provided to requesting physician.      "

## 2022-08-10 NOTE — PATIENT INSTRUCTIONS
Preparing for Your Surgery  Getting started  A nurse will call you to review your health history and instructions. They will give you an arrival time based on your scheduled surgery time. Please be ready to share:    Your doctor's clinic name and phone number    Your medical, surgical and anesthesia history    A list of allergies and sensitivities    A list of medicines, including herbal treatments and over-the-counter drugs    Whether the patient has a legal guardian (ask how to send us the papers in advance)  Please tell us if you're pregnant--or if there's any chance you might be pregnant. Some surgeries may injure a fetus (unborn baby), so they require a pregnancy test. Surgeries that are safe for a fetus don't always need a test, and you can choose whether to have one.   If you have a child who's having surgery, please ask for a copy of Preparing for Your Child's Surgery.    Preparing for surgery    Within 30 days of surgery: Have a pre-op exam (sometimes called an H&P, or History and Physical). This can be done at a clinic or pre-operative center.  ? If you're having a , you may not need this exam. Talk to your care team.    At your pre-op exam, talk to your care team about all medicines you take. If you need to stop any medicines before surgery, ask when to start taking them again.  ? We do this for your safety. Many medicines can make you bleed too much during surgery. Some change how well surgery (anesthesia) drugs work.    Call your insurance company to let them know you're having surgery. (If you don't have insurance, call [phone].)    Call your clinic if there's any change in your health. This includes signs of a cold or flu (sore throat, runny nose, cough, rash, fever). It also includes a scrape or scratch near the surgery site.    If you have questions on the day of surgery, call your hospital or surgery center.  COVID testing  You may need to be tested for COVID-19 before having  surgery. If so, we will give you instructions.  Eating and drinking guidelines  For your safety: Unless your surgeon tells you otherwise, follow the guidelines below.    Eat and drink as usual until 8 hours before surgery. After that, no food or milk.    Drink clear liquids until 2 hours before surgery. These are liquids you can see through, like water, Gatorade and Propel Water. You may also have black coffee and tea (no cream or milk).    Nothing by mouth within 2 hours of surgery. This includes gum, candy and breath mints.    If you drink alcohol: Stop drinking it the night before surgery.    If your care team tells you to take medicine on the morning of surgery, it's okay to take it with a sip of water.  Preventing infection    Shower or bathe the night before and morning of your surgery. Follow the instructions your clinic gave you. (If no instructions, use regular soap.)    Don't shave or clip hair near your surgery site. We'll remove the hair if needed.    Don't smoke or vape the morning of surgery. You may chew nicotine gum up to 2 hours before surgery. A nicotine patch is okay.  ? Note: Some surgeries require you to completely quit smoking and nicotine. Check with your surgeon.    Your care team will make every effort to keep you safe from infection. We will:  ? Clean our hands often with soap and water (or an alcohol-based hand rub).  ? Clean the skin at your surgery site with a special soap that kills germs.  ? Give you a special gown to keep you warm. (Cold raises the risk of infection.)  ? Wear special hair covers, masks, gowns and gloves during surgery.  ? Give antibiotic medicine, if prescribed. Not all surgeries need antibiotics.  What to bring on the day of surgery    Photo ID and insurance card    Copy of your health care directive, if you have one    Glasses and hearing aides (bring cases)  ? You can't wear contacts during surgery    Inhaler and eye drops, if you use them (tell us about these when  you arrive)    CPAP machine or breathing device, if you use them    A few personal items, if spending the night    If you have . . .  ? A pacemaker, ICD (cardiac defibrillator) or other implant: Bring the ID card.  ? An implanted stimulator: Bring the remote control.  ? A legal guardian: Bring a copy of the certified (court-stamped) guardianship papers.  Please remove any jewelry, including body piercings. Leave jewelry and other valuables at home.  If you're going home the day of surgery    You must have a responsible adult drive you home. They should stay with you overnight as well.    If you don't have someone to stay with you, and you aren't safe to go home alone, we may keep you overnight. Insurance often won't pay for this.  After surgery  If it's hard to control your pain or you need more pain medicine, please call your surgeon's office.  Questions?   If you have any questions for your care team, list them here: _________________________________________________________________________________________________________________________________________________________________________ ____________________________________ ____________________________________ ____________________________________  For informational purposes only. Not to replace the advice of your health care provider. Copyright   2003, 2019 [facility]. All rights reserved. Clinically reviewed by Francesca Desir MD. Gema [id number] - REV 07/21.

## 2022-08-10 NOTE — PROGRESS NOTES
[facility]  [address] [facility] [address]  Phone: [phone]  Fax: [phone]  Primary Provider: Dennise Lepe  Pre-op Performing Provider: DENNISE LEPE      PREOPERATIVE EVALUATION:  Today's date: 8/10/2022    Yokasta Gary is a 37 year old female who presents for a preoperative evaluation.    Surgical Information:  Surgery/Procedure: Gastric bypass    Surgery Location: [facility]  Surgeon: Dr. JUAN Mcgraw  Surgery Date: 8/29/22  Time of Surgery: TBD  Where patient plans to recover: At home with family  Fax number for surgical facility: Note does not need to be faxed, will be available electronically in Epic.    Type of Anesthesia Anticipated: General  1. Preop general physical exam  Yokasta is approved for surgery with general anesthesia.  Her EKG is normal today.  Blood work was drawn.  She does not need a COVID test as she tested positive on 7/29/2022 and has now fully recovered.  She will return tomorrow for her chest x-ray as we did not have an x-ray tech in clinic today.    2. Pre-op testing  - UA with Microscopic reflex to Culture  - Partial thromboplastin time  - INR  - Hemoglobin A1c  - EKG 12-lead complete w/read - Clinics  - Comprehensive metabolic panel  - CBC with Platelets & Differential  - EKG 12-lead, tracing only    3. Morbid obesity (H)  - UA with Microscopic reflex to Culture  - Partial thromboplastin time  - INR  - Hemoglobin A1c  - EKG 12-lead complete w/read - Clinics  - Comprehensive metabolic panel  - CBC with Platelets & Differential    4. Type 2 diabetes mellitus without complication, without long-term current use of insulin (H)      Subjective     HPI related to upcoming procedure: Yokasta presents for preop physical today.  She is having a Lupe-en-Y gastric bypass.  Her  recently had this done and so she feels like she is well prepared for what to expect.  She is gone through the [place] [facility] [place] [facility] [place]  [place]  She has no known coronary artery disease and denies chest pain or shortness of breath.  She does have type 2 diabetes which was recently diagnosed and is taking metformin.  She has started her preop medications.  She has had no recent illnesses or exposure other than COVID on 7/29 which she feels like she is completely recovered from.    Preop Questions 8/9/2022   1. Have you ever had a heart attack or stroke? No   2. Have you ever had surgery on your heart or blood vessels, such as a stent placement, a coronary artery bypass, or surgery on an artery in your head, neck, heart, or legs? No   3. Do you have chest pain with activity? No   4. Do you have a history of  heart failure? No   5. Do you currently have a cold, bronchitis or symptoms of other infection? No   6. Do you have a cough, shortness of breath, or wheezing? No   7. Do you or anyone in your family have previous history of blood clots? No   8. Do you or does anyone in your family have a serious bleeding problem such as prolonged bleeding following surgeries or cuts? No   9. Have you ever had problems with anemia or been told to take iron pills? No   10. Have you had any abnormal blood loss such as black, tarry or bloody stools, or abnormal vaginal bleeding? No   11. Have you ever had a blood transfusion? No   12. Are you willing to have a blood transfusion if it is medically needed before, during, or after your surgery? Yes   13. Have you or any of your relatives ever had problems with anesthesia? No   14. Do you have sleep apnea, excessive snoring or daytime drowsiness? No   15. Do you have any artifical heart valves or other implanted medical devices like a pacemaker, defibrillator, or continuous glucose monitor? No   16. Do you have artificial joints? No   17. Are you allergic to latex? No   18. Is there any chance that you may be pregnant? No       Health Care Directive:  Patient does not have a Health Care Directive or Living Will:  Discussed advance care planning with patient; information given to patient to review.    Preoperative Review of :   reviewed - no record of controlled substances prescribed.      Status of Chronic Conditions:  See problem list for active medical problems.  Problems all longstanding and stable, except as noted/documented.  See ROS for pertinent symptoms related to these conditions.      Review of Systems  CONSTITUTIONAL: NEGATIVE for fever, chills, change in weight  INTEGUMENTARY/SKIN: NEGATIVE for worrisome rashes, moles or lesions  EYES: NEGATIVE for vision changes or irritation  ENT/MOUTH: NEGATIVE for ear, mouth and throat problems  RESP: NEGATIVE for significant cough or SOB  CV: NEGATIVE for chest pain, palpitations or peripheral edema  GI: NEGATIVE for nausea, abdominal pain, heartburn, or change in bowel habits  : NEGATIVE for frequency, dysuria, or hematuria  MUSCULOSKELETAL: NEGATIVE for significant arthralgias or myalgia  NEURO: NEGATIVE for weakness, dizziness or paresthesias  ENDOCRINE: NEGATIVE for temperature intolerance, skin/hair changes  HEME: NEGATIVE for bleeding problems  PSYCHIATRIC: NEGATIVE for changes in mood or affect    Patient Active Problem List    Diagnosis Date Noted     Type 2 diabetes mellitus without complication, without long-term current use of insulin (H) 2022     Priority: Medium     Morbid obesity (H) 2022     Priority: Medium      Past Medical History:   Diagnosis Date     COVID-19     Marita 2021.     Dyspepsia     rare gaviscon use     Insulin controlled gestational diabetes mellitus (GDM) in second trimester 2018     Type 2 diabetes mellitus without complication, without long-term current use of insulin (H) 2022    hx of gestational diabetes, A1c now 6.8% 2022 as she enters bariatric surgery program.     Past Surgical History:   Procedure Laterality Date     ANTERIOR CRUCIATE LIGAMENT REPAIR Left       SECTION N/A 10/17/2014     Procedure:  SECTION;  Surgeon: Maureen Chanel MD;  Location: [facility];  Service:       SECTION N/A 9/10/2018    Procedure:  SECTION, REPEAT;  Surgeon: Dorota Guzman MD;  Location: [facility];  Service:      Current Outpatient Medications   Medication Sig Dispense Refill     [START ON 2022] aprepitant (EMEND) 40 MG capsule Take 1 capsule (40 mg) by mouth once for 1 dose Take on the morning of surgery, prior to arrival at surgery center. 1 capsule 0     Cyanocobalamin (B-12) 1000 MCG SUBL Take 3 days weekly 150 tablet 1     [START ON 2022] hyoscyamine (LEVSIN/SL) 0.125 MG sublingual tablet Place 1 tablet (0.125 mg) under the tongue every 4 hours as needed for cramping 30 tablet 1     levonorgestrel (MIRENA, 52 MG,) 20 MCG/24HR IUD 1 each by Intrauterine route once       metFORMIN (GLUCOPHAGE) 500 MG tablet Start one tablet with supper for 2-3 weeks then increase if tolerating it to one tablet, twice daily with breakfast and supper. 60 tablet 0     [START ON 2022] omeprazole (PRILOSEC) 20 MG DR capsule Take 1 capsule (20 mg) by mouth daily for 90 days Start day after surgery, open contents and sprinkle on food for first 6 weeks. Take daily for 3 months after surgery. 90 capsule 0     [START ON 2022] ondansetron (ZOFRAN ODT) 4 MG ODT tab Take 1 tablet (4 mg) by mouth every 8 hours as needed for nausea 12 tablet 1     Pediatric Multivitamins-Iron (MULTIVITAMINS PLUS IRON CHILD) 18 MG CHEW Take 1 chew tab by mouth 2 times daily Ok to substitute with any chewable that contains 18 mg of iron, Vitamin A, Thiamine and Zinc. 180 tablet 3     [START ON 2022] ursodiol (ACTIGALL) 300 MG capsule Take 1 capsule (300 mg) by mouth 2 times daily for 180 days Start 2 weeks after surgery, do not open-take with warm liquid. Take twice a day for 6 months. 180 capsule 1       No Known Allergies     Social History     Tobacco Use     Smoking status: Former Smoker  "    Quit date: 2009     Years since quittin.9     Smokeless tobacco: Never Used   Substance Use Topics     Alcohol use: No       History   Drug Use No         Objective     BP (!) 145/102   Pulse 87   Resp 16   Ht 1.6 m (5' 2.99\")   Wt 125.6 kg (277 lb)   LMP  (LMP Unknown)   BMI 49.08 kg/m      Physical Exam    GENERAL APPEARANCE: healthy, alert and no distress     EYES: EOMI, PERRL     HENT: ear canals and TM's normal and nose and mouth without ulcers or lesions     NECK: no adenopathy, no asymmetry, masses, or scars and thyroid normal to palpation     RESP: lungs clear to auscultation - no rales, rhonchi or wheezes     CV: regular rates and rhythm, normal S1 S2, no S3 or S4 and no murmur, click or rub     ABDOMEN:  soft, nontender, no HSM or masses and bowel sounds normal     MS: extremities normal- no gross deformities noted, no evidence of inflammation in joints, FROM in all extremities.     SKIN: no suspicious lesions or rashes     NEURO: Normal strength and tone, sensory exam grossly normal, mentation intact and speech normal     PSYCH: mentation appears normal. and affect normal/bright     LYMPHATICS: No cervical adenopathy    Recent Labs   Lab Test 22  1125   HGB 15.0         POTASSIUM 3.9   CR 0.75   A1C 6.8*        Diagnostics:  Labs pending at this time.  Results will be reviewed when available.   EKG: appears normal, NSR, normal axis, normal intervals, no acute ST/T changes c/w ischemia, no LVH by voltage criteria, unchanged from previous tracings  Chest x-ray: Pending at this time.  Revised Cardiac Risk Index (RCRI):  The patient has the following serious cardiovascular risks for perioperative complications:   - No serious cardiac risks = 0 points     RCRI Interpretation: 0 points: Class I (very low risk - 0.4% complication rate)           Signed Electronically by: Dennise Ruff  Copy of this evaluation report is provided to requesting physician.      "

## 2022-08-11 ENCOUNTER — ANCILLARY PROCEDURE (OUTPATIENT)
Dept: GENERAL RADIOLOGY | Facility: CLINIC | Age: 37
End: 2022-08-11
Payer: COMMERCIAL

## 2022-08-11 DIAGNOSIS — E11.9 TYPE 2 DIABETES MELLITUS WITHOUT COMPLICATION, WITHOUT LONG-TERM CURRENT USE OF INSULIN (H): ICD-10-CM

## 2022-08-11 DIAGNOSIS — Z01.818 PRE-OP TESTING: ICD-10-CM

## 2022-08-11 DIAGNOSIS — E66.01 MORBID OBESITY (H): ICD-10-CM

## 2022-08-11 PROCEDURE — 71046 X-RAY EXAM CHEST 2 VIEWS: CPT | Mod: TC | Performed by: RADIOLOGY

## 2022-08-12 LAB — BACTERIA UR CULT: NORMAL

## 2022-08-27 ENCOUNTER — ANESTHESIA EVENT (OUTPATIENT)
Dept: SURGERY | Facility: HOSPITAL | Age: 37
End: 2022-08-27
Payer: COMMERCIAL

## 2022-08-28 ASSESSMENT — LIFESTYLE VARIABLES: TOBACCO_USE: 1

## 2022-08-29 ENCOUNTER — ANESTHESIA (OUTPATIENT)
Dept: SURGERY | Facility: HOSPITAL | Age: 37
End: 2022-08-29
Payer: COMMERCIAL

## 2022-08-29 ENCOUNTER — HOSPITAL ENCOUNTER (INPATIENT)
Facility: HOSPITAL | Age: 37
LOS: 1 days | Discharge: HOME | End: 2022-08-29
Attending: SURGERY | Admitting: SURGERY
Payer: COMMERCIAL

## 2022-08-29 VITALS
HEART RATE: 90 BPM | WEIGHT: 265.9 LBS | SYSTOLIC BLOOD PRESSURE: 144 MMHG | DIASTOLIC BLOOD PRESSURE: 82 MMHG | OXYGEN SATURATION: 97 % | TEMPERATURE: 97.8 F | BODY MASS INDEX: 47.12 KG/M2 | RESPIRATION RATE: 20 BRPM

## 2022-08-29 DIAGNOSIS — E66.01 MORBID (SEVERE) OBESITY DUE TO EXCESS CALORIES (H): Primary | ICD-10-CM

## 2022-08-29 DIAGNOSIS — E11.9 TYPE 2 DIABETES MELLITUS WITHOUT COMPLICATION, WITHOUT LONG-TERM CURRENT USE OF INSULIN (H): ICD-10-CM

## 2022-08-29 DIAGNOSIS — Z98.84 S/P GASTRIC BYPASS: ICD-10-CM

## 2022-08-29 LAB
GLUCOSE BLDC GLUCOMTR-MCNC: 229 MG/DL (ref 70–99)
HCG UR QL: NEGATIVE
HOLD SPECIMEN: NORMAL
HOLD SPECIMEN: NORMAL
MAGNESIUM SERPL-MCNC: 2.1 MG/DL (ref 1.8–2.6)

## 2022-08-29 PROCEDURE — 81025 URINE PREGNANCY TEST: CPT | Performed by: ANESTHESIOLOGY

## 2022-08-29 PROCEDURE — 710N000012 HC RECOVERY PHASE 2, PER MINUTE: Performed by: SURGERY

## 2022-08-29 PROCEDURE — 250N000011 HC RX IP 250 OP 636: Performed by: SURGERY

## 2022-08-29 PROCEDURE — 272N000001 HC OR GENERAL SUPPLY STERILE: Performed by: SURGERY

## 2022-08-29 PROCEDURE — 250N000011 HC RX IP 250 OP 636: Performed by: ANESTHESIOLOGY

## 2022-08-29 PROCEDURE — 250N000012 HC RX MED GY IP 250 OP 636 PS 637: Performed by: ANESTHESIOLOGY

## 2022-08-29 PROCEDURE — 710N000009 HC RECOVERY PHASE 1, LEVEL 1, PER MIN: Performed by: SURGERY

## 2022-08-29 PROCEDURE — 83735 ASSAY OF MAGNESIUM: CPT | Performed by: ANESTHESIOLOGY

## 2022-08-29 PROCEDURE — 250N000011 HC RX IP 250 OP 636: Performed by: NURSE ANESTHETIST, CERTIFIED REGISTERED

## 2022-08-29 PROCEDURE — 0D164ZA BYPASS STOMACH TO JEJUNUM, PERCUTANEOUS ENDOSCOPIC APPROACH: ICD-10-PCS | Performed by: SURGERY

## 2022-08-29 PROCEDURE — 258N000003 HC RX IP 258 OP 636: Performed by: ANESTHESIOLOGY

## 2022-08-29 PROCEDURE — 120N000001 HC R&B MED SURG/OB

## 2022-08-29 PROCEDURE — 250N000011 HC RX IP 250 OP 636: Performed by: NURSE PRACTITIONER

## 2022-08-29 PROCEDURE — 43644 LAP GASTRIC BYPASS/ROUX-EN-Y: CPT | Performed by: SURGERY

## 2022-08-29 PROCEDURE — 250N000009 HC RX 250: Performed by: NURSE ANESTHETIST, CERTIFIED REGISTERED

## 2022-08-29 PROCEDURE — 250N000013 HC RX MED GY IP 250 OP 250 PS 637: Performed by: NURSE PRACTITIONER

## 2022-08-29 PROCEDURE — 360N000077 HC SURGERY LEVEL 4, PER MIN: Performed by: SURGERY

## 2022-08-29 PROCEDURE — 258N000003 HC RX IP 258 OP 636: Performed by: NURSE ANESTHETIST, CERTIFIED REGISTERED

## 2022-08-29 PROCEDURE — 43644 LAP GASTRIC BYPASS/ROUX-EN-Y: CPT | Mod: AS | Performed by: NURSE PRACTITIONER

## 2022-08-29 PROCEDURE — 250N000009 HC RX 250: Performed by: SURGERY

## 2022-08-29 PROCEDURE — 36415 COLL VENOUS BLD VENIPUNCTURE: CPT | Performed by: ANESTHESIOLOGY

## 2022-08-29 PROCEDURE — 250N000013 HC RX MED GY IP 250 OP 250 PS 637: Performed by: SURGERY

## 2022-08-29 PROCEDURE — 370N000017 HC ANESTHESIA TECHNICAL FEE, PER MIN: Performed by: SURGERY

## 2022-08-29 PROCEDURE — 999N000141 HC STATISTIC PRE-PROCEDURE NURSING ASSESSMENT: Performed by: SURGERY

## 2022-08-29 RX ORDER — NALOXONE HYDROCHLORIDE 1 MG/ML
0.4 INJECTION INTRAMUSCULAR; INTRAVENOUS; SUBCUTANEOUS
Status: DISCONTINUED | OUTPATIENT
Start: 2022-08-29 | End: 2022-08-29

## 2022-08-29 RX ORDER — KETOROLAC TROMETHAMINE 30 MG/ML
30 INJECTION, SOLUTION INTRAMUSCULAR; INTRAVENOUS EVERY 6 HOURS
Status: DISCONTINUED | OUTPATIENT
Start: 2022-08-29 | End: 2022-08-29 | Stop reason: HOSPADM

## 2022-08-29 RX ORDER — SCOLOPAMINE TRANSDERMAL SYSTEM 1 MG/1
1 PATCH, EXTENDED RELEASE TRANSDERMAL ONCE
Status: DISCONTINUED | OUTPATIENT
Start: 2022-08-29 | End: 2022-08-29

## 2022-08-29 RX ORDER — SCOLOPAMINE TRANSDERMAL SYSTEM 1 MG/1
1 PATCH, EXTENDED RELEASE TRANSDERMAL ONCE
Status: DISCONTINUED | OUTPATIENT
Start: 2022-08-29 | End: 2022-08-29 | Stop reason: HOSPADM

## 2022-08-29 RX ORDER — ONDANSETRON 2 MG/ML
4 INJECTION INTRAMUSCULAR; INTRAVENOUS EVERY 30 MIN PRN
Status: DISCONTINUED | OUTPATIENT
Start: 2022-08-29 | End: 2022-08-29

## 2022-08-29 RX ORDER — MORPHINE SULFATE 1 MG/ML
150 INJECTION, SOLUTION EPIDURAL; INTRATHECAL; INTRAVENOUS ONCE
Status: DISCONTINUED | OUTPATIENT
Start: 2022-08-29 | End: 2022-08-29 | Stop reason: HOSPADM

## 2022-08-29 RX ORDER — ACETAMINOPHEN 325 MG/1
975 TABLET ORAL EVERY 6 HOURS
Status: DISCONTINUED | OUTPATIENT
Start: 2022-08-29 | End: 2022-08-29 | Stop reason: HOSPADM

## 2022-08-29 RX ORDER — SODIUM CHLORIDE, SODIUM LACTATE, POTASSIUM CHLORIDE, CALCIUM CHLORIDE 600; 310; 30; 20 MG/100ML; MG/100ML; MG/100ML; MG/100ML
INJECTION, SOLUTION INTRAVENOUS CONTINUOUS
Status: DISCONTINUED | OUTPATIENT
Start: 2022-08-29 | End: 2022-08-29

## 2022-08-29 RX ORDER — PROCHLORPERAZINE MALEATE 10 MG
10 TABLET ORAL EVERY 6 HOURS PRN
Status: DISCONTINUED | OUTPATIENT
Start: 2022-08-29 | End: 2022-08-29 | Stop reason: HOSPADM

## 2022-08-29 RX ORDER — GLYCOPYRROLATE 0.2 MG/ML
INJECTION, SOLUTION INTRAMUSCULAR; INTRAVENOUS PRN
Status: DISCONTINUED | OUTPATIENT
Start: 2022-08-29 | End: 2022-08-29

## 2022-08-29 RX ORDER — HYDROMORPHONE HYDROCHLORIDE 1 MG/ML
0.4 INJECTION, SOLUTION INTRAMUSCULAR; INTRAVENOUS; SUBCUTANEOUS EVERY 5 MIN PRN
Status: DISCONTINUED | OUTPATIENT
Start: 2022-08-29 | End: 2022-08-29 | Stop reason: HOSPADM

## 2022-08-29 RX ORDER — ENOXAPARIN SODIUM 100 MG/ML
40 INJECTION SUBCUTANEOUS ONCE
Status: COMPLETED | OUTPATIENT
Start: 2022-08-29 | End: 2022-08-29

## 2022-08-29 RX ORDER — ONDANSETRON 4 MG/1
4 TABLET, ORALLY DISINTEGRATING ORAL EVERY 6 HOURS PRN
Status: DISCONTINUED | OUTPATIENT
Start: 2022-08-29 | End: 2022-08-29 | Stop reason: HOSPADM

## 2022-08-29 RX ORDER — FENTANYL CITRATE 50 UG/ML
50 INJECTION, SOLUTION INTRAMUSCULAR; INTRAVENOUS
Status: DISCONTINUED | OUTPATIENT
Start: 2022-08-29 | End: 2022-08-29 | Stop reason: HOSPADM

## 2022-08-29 RX ORDER — FENTANYL CITRATE 50 UG/ML
100 INJECTION, SOLUTION INTRAMUSCULAR; INTRAVENOUS
Status: DISCONTINUED | OUTPATIENT
Start: 2022-08-29 | End: 2022-08-29 | Stop reason: HOSPADM

## 2022-08-29 RX ORDER — OXYCODONE HYDROCHLORIDE 5 MG/1
5 TABLET ORAL EVERY 4 HOURS PRN
Status: DISCONTINUED | OUTPATIENT
Start: 2022-08-29 | End: 2022-08-29

## 2022-08-29 RX ORDER — FENTANYL CITRATE 50 UG/ML
INJECTION, SOLUTION INTRAMUSCULAR; INTRAVENOUS PRN
Status: DISCONTINUED | OUTPATIENT
Start: 2022-08-29 | End: 2022-08-29

## 2022-08-29 RX ORDER — ONDANSETRON 2 MG/ML
4 INJECTION INTRAMUSCULAR; INTRAVENOUS EVERY 6 HOURS PRN
Status: DISCONTINUED | OUTPATIENT
Start: 2022-08-29 | End: 2022-08-29 | Stop reason: HOSPADM

## 2022-08-29 RX ORDER — NALOXONE HYDROCHLORIDE 1 MG/ML
0.2 INJECTION INTRAMUSCULAR; INTRAVENOUS; SUBCUTANEOUS
Status: DISCONTINUED | OUTPATIENT
Start: 2022-08-29 | End: 2022-08-29

## 2022-08-29 RX ORDER — ONDANSETRON 2 MG/ML
4 INJECTION INTRAMUSCULAR; INTRAVENOUS EVERY 4 HOURS PRN
Status: DISCONTINUED | OUTPATIENT
Start: 2022-08-29 | End: 2022-08-29

## 2022-08-29 RX ORDER — ONDANSETRON 2 MG/ML
4 INJECTION INTRAMUSCULAR; INTRAVENOUS
Status: COMPLETED | OUTPATIENT
Start: 2022-08-29 | End: 2022-08-29

## 2022-08-29 RX ORDER — KETOROLAC TROMETHAMINE 30 MG/ML
30 INJECTION, SOLUTION INTRAMUSCULAR; INTRAVENOUS EVERY 6 HOURS
Status: DISCONTINUED | OUTPATIENT
Start: 2022-08-29 | End: 2022-08-29

## 2022-08-29 RX ORDER — ONDANSETRON 2 MG/ML
INJECTION INTRAMUSCULAR; INTRAVENOUS PRN
Status: DISCONTINUED | OUTPATIENT
Start: 2022-08-29 | End: 2022-08-29

## 2022-08-29 RX ORDER — DEXAMETHASONE SODIUM PHOSPHATE 10 MG/ML
INJECTION, SOLUTION INTRAMUSCULAR; INTRAVENOUS PRN
Status: DISCONTINUED | OUTPATIENT
Start: 2022-08-29 | End: 2022-08-29

## 2022-08-29 RX ORDER — PROPOFOL 10 MG/ML
INJECTION, EMULSION INTRAVENOUS PRN
Status: DISCONTINUED | OUTPATIENT
Start: 2022-08-29 | End: 2022-08-29

## 2022-08-29 RX ORDER — KETAMINE HYDROCHLORIDE 10 MG/ML
INJECTION INTRAMUSCULAR; INTRAVENOUS PRN
Status: DISCONTINUED | OUTPATIENT
Start: 2022-08-29 | End: 2022-08-29

## 2022-08-29 RX ORDER — TRAMADOL HYDROCHLORIDE 50 MG/1
50 TABLET ORAL EVERY 6 HOURS PRN
Qty: 8 TABLET | Refills: 0 | Status: SHIPPED | OUTPATIENT
Start: 2022-08-29 | End: 2023-04-11

## 2022-08-29 RX ORDER — ACETAMINOPHEN 325 MG/1
975 TABLET ORAL ONCE
Status: COMPLETED | OUTPATIENT
Start: 2022-08-29 | End: 2022-08-29

## 2022-08-29 RX ORDER — NALBUPHINE HYDROCHLORIDE 10 MG/ML
2.5-5 INJECTION, SOLUTION INTRAMUSCULAR; INTRAVENOUS; SUBCUTANEOUS EVERY 6 HOURS PRN
Status: DISCONTINUED | OUTPATIENT
Start: 2022-08-29 | End: 2022-08-29

## 2022-08-29 RX ORDER — SODIUM CHLORIDE, SODIUM LACTATE, POTASSIUM CHLORIDE, CALCIUM CHLORIDE 600; 310; 30; 20 MG/100ML; MG/100ML; MG/100ML; MG/100ML
INJECTION, SOLUTION INTRAVENOUS CONTINUOUS
Status: DISCONTINUED | OUTPATIENT
Start: 2022-08-29 | End: 2022-08-29 | Stop reason: HOSPADM

## 2022-08-29 RX ORDER — BUPIVACAINE HYDROCHLORIDE AND EPINEPHRINE 2.5; 5 MG/ML; UG/ML
INJECTION, SOLUTION EPIDURAL; INFILTRATION; INTRACAUDAL; PERINEURAL PRN
Status: DISCONTINUED | OUTPATIENT
Start: 2022-08-29 | End: 2022-08-29 | Stop reason: HOSPADM

## 2022-08-29 RX ORDER — CEFAZOLIN SODIUM/WATER 3 G/30 ML
3 SYRINGE (ML) INTRAVENOUS
Status: DISCONTINUED | OUTPATIENT
Start: 2022-08-29 | End: 2022-08-29 | Stop reason: HOSPADM

## 2022-08-29 RX ORDER — GABAPENTIN 300 MG/1
600 CAPSULE ORAL
Status: COMPLETED | OUTPATIENT
Start: 2022-08-29 | End: 2022-08-29

## 2022-08-29 RX ORDER — ONDANSETRON 4 MG/1
4 TABLET, ORALLY DISINTEGRATING ORAL EVERY 30 MIN PRN
Status: DISCONTINUED | OUTPATIENT
Start: 2022-08-29 | End: 2022-08-29

## 2022-08-29 RX ORDER — GABAPENTIN 250 MG/5ML
250 SOLUTION ORAL EVERY 8 HOURS
Qty: 60 ML | Refills: 0 | Status: SHIPPED | OUTPATIENT
Start: 2022-08-29 | End: 2023-04-11

## 2022-08-29 RX ORDER — LIDOCAINE HYDROCHLORIDE 20 MG/ML
INJECTION, SOLUTION INFILTRATION; PERINEURAL PRN
Status: DISCONTINUED | OUTPATIENT
Start: 2022-08-29 | End: 2022-08-29

## 2022-08-29 RX ORDER — TRAMADOL HYDROCHLORIDE 50 MG/1
100 TABLET ORAL EVERY 6 HOURS PRN
Status: DISCONTINUED | OUTPATIENT
Start: 2022-08-29 | End: 2022-08-29 | Stop reason: HOSPADM

## 2022-08-29 RX ORDER — LIDOCAINE 40 MG/G
CREAM TOPICAL
Status: DISCONTINUED | OUTPATIENT
Start: 2022-08-29 | End: 2022-08-29 | Stop reason: HOSPADM

## 2022-08-29 RX ORDER — CEFAZOLIN SODIUM/WATER 3 G/30 ML
3 SYRINGE (ML) INTRAVENOUS SEE ADMIN INSTRUCTIONS
Status: DISCONTINUED | OUTPATIENT
Start: 2022-08-29 | End: 2022-08-29 | Stop reason: HOSPADM

## 2022-08-29 RX ORDER — FENTANYL CITRATE 50 UG/ML
50 INJECTION, SOLUTION INTRAMUSCULAR; INTRAVENOUS EVERY 5 MIN PRN
Status: DISCONTINUED | OUTPATIENT
Start: 2022-08-29 | End: 2022-08-29 | Stop reason: HOSPADM

## 2022-08-29 RX ORDER — GABAPENTIN 250 MG/5ML
250 SOLUTION ORAL EVERY 8 HOURS SCHEDULED
Status: DISCONTINUED | OUTPATIENT
Start: 2022-08-29 | End: 2022-08-29 | Stop reason: HOSPADM

## 2022-08-29 RX ORDER — MEPERIDINE HYDROCHLORIDE 25 MG/ML
12.5 INJECTION INTRAMUSCULAR; INTRAVENOUS; SUBCUTANEOUS
Status: DISCONTINUED | OUTPATIENT
Start: 2022-08-29 | End: 2022-08-29

## 2022-08-29 RX ORDER — LORAZEPAM 2 MG/ML
.5-1 INJECTION INTRAMUSCULAR EVERY 6 HOURS PRN
Status: DISCONTINUED | OUTPATIENT
Start: 2022-08-29 | End: 2022-08-29 | Stop reason: HOSPADM

## 2022-08-29 RX ORDER — PROPOFOL 10 MG/ML
INJECTION, EMULSION INTRAVENOUS CONTINUOUS PRN
Status: DISCONTINUED | OUTPATIENT
Start: 2022-08-29 | End: 2022-08-29

## 2022-08-29 RX ORDER — HALOPERIDOL 5 MG/ML
1 INJECTION INTRAMUSCULAR
Status: DISCONTINUED | OUTPATIENT
Start: 2022-08-29 | End: 2022-08-29

## 2022-08-29 RX ORDER — KETOROLAC TROMETHAMINE 30 MG/ML
INJECTION, SOLUTION INTRAMUSCULAR; INTRAVENOUS PRN
Status: DISCONTINUED | OUTPATIENT
Start: 2022-08-29 | End: 2022-08-29

## 2022-08-29 RX ADMIN — FAMOTIDINE 20 MG: 10 INJECTION, SOLUTION INTRAVENOUS at 07:00

## 2022-08-29 RX ADMIN — PHENYLEPHRINE HYDROCHLORIDE 100 MCG: 10 INJECTION INTRAVENOUS at 07:52

## 2022-08-29 RX ADMIN — ACETAMINOPHEN 975 MG: 325 TABLET ORAL at 06:48

## 2022-08-29 RX ADMIN — SODIUM CHLORIDE, POTASSIUM CHLORIDE, SODIUM LACTATE AND CALCIUM CHLORIDE: 600; 310; 30; 20 INJECTION, SOLUTION INTRAVENOUS at 08:41

## 2022-08-29 RX ADMIN — ROCURONIUM BROMIDE 20 MG: 50 INJECTION, SOLUTION INTRAVENOUS at 08:27

## 2022-08-29 RX ADMIN — PROPOFOL 150 MCG/KG/MIN: 10 INJECTION, EMULSION INTRAVENOUS at 07:30

## 2022-08-29 RX ADMIN — FENTANYL CITRATE 50 MCG: 50 INJECTION, SOLUTION INTRAMUSCULAR; INTRAVENOUS at 11:08

## 2022-08-29 RX ADMIN — ROCURONIUM BROMIDE 50 MG: 50 INJECTION, SOLUTION INTRAVENOUS at 07:30

## 2022-08-29 RX ADMIN — ACETAMINOPHEN 975 MG: 325 SOLUTION ORAL at 12:38

## 2022-08-29 RX ADMIN — Medication 3 G: at 07:18

## 2022-08-29 RX ADMIN — ONDANSETRON 4 MG: 2 INJECTION INTRAMUSCULAR; INTRAVENOUS at 08:37

## 2022-08-29 RX ADMIN — SUGAMMADEX 500 MG: 100 INJECTION, SOLUTION INTRAVENOUS at 08:44

## 2022-08-29 RX ADMIN — SODIUM CHLORIDE, POTASSIUM CHLORIDE, SODIUM LACTATE AND CALCIUM CHLORIDE: 600; 310; 30; 20 INJECTION, SOLUTION INTRAVENOUS at 07:10

## 2022-08-29 RX ADMIN — KETOROLAC TROMETHAMINE 30 MG: 30 INJECTION, SOLUTION INTRAMUSCULAR; INTRAVENOUS at 13:19

## 2022-08-29 RX ADMIN — ONDANSETRON 4 MG: 2 INJECTION INTRAMUSCULAR; INTRAVENOUS at 11:08

## 2022-08-29 RX ADMIN — GLYCOPYRROLATE 0.3 MG: 0.2 INJECTION, SOLUTION INTRAMUSCULAR; INTRAVENOUS at 07:31

## 2022-08-29 RX ADMIN — INSULIN ASPART 2 UNITS: 100 INJECTION, SOLUTION INTRAVENOUS; SUBCUTANEOUS at 09:53

## 2022-08-29 RX ADMIN — Medication 12 MCG: at 08:04

## 2022-08-29 RX ADMIN — FENTANYL CITRATE 100 MCG: 50 INJECTION, SOLUTION INTRAMUSCULAR; INTRAVENOUS at 07:29

## 2022-08-29 RX ADMIN — GABAPENTIN 600 MG: 300 CAPSULE ORAL at 06:48

## 2022-08-29 RX ADMIN — ENOXAPARIN SODIUM 40 MG: 40 INJECTION SUBCUTANEOUS at 07:36

## 2022-08-29 RX ADMIN — MIDAZOLAM 2 MG: 1 INJECTION INTRAMUSCULAR; INTRAVENOUS at 07:20

## 2022-08-29 RX ADMIN — PHENYLEPHRINE HYDROCHLORIDE 100 MCG: 10 INJECTION INTRAVENOUS at 08:25

## 2022-08-29 RX ADMIN — HYOSCYAMINE SULFATE 125 MCG: 0.12 TABLET, ORALLY DISINTEGRATING ORAL at 10:41

## 2022-08-29 RX ADMIN — GABAPENTIN 250 MG: 250 SOLUTION ORAL at 13:44

## 2022-08-29 RX ADMIN — KETAMINE HYDROCHLORIDE 50 MG: 10 INJECTION, SOLUTION INTRAMUSCULAR; INTRAVENOUS at 07:31

## 2022-08-29 RX ADMIN — KETOROLAC TROMETHAMINE 30 MG: 30 INJECTION, SOLUTION INTRAMUSCULAR at 08:37

## 2022-08-29 RX ADMIN — LIDOCAINE HYDROCHLORIDE 60 MG: 20 INJECTION, SOLUTION INFILTRATION; PERINEURAL at 07:29

## 2022-08-29 RX ADMIN — HYDROMORPHONE HYDROCHLORIDE 0.5 MG: 1 INJECTION, SOLUTION INTRAMUSCULAR; INTRAVENOUS; SUBCUTANEOUS at 07:44

## 2022-08-29 RX ADMIN — DEXAMETHASONE SODIUM PHOSPHATE 4 MG: 10 INJECTION, SOLUTION INTRAMUSCULAR; INTRAVENOUS at 07:31

## 2022-08-29 RX ADMIN — PROPOFOL 200 MG: 10 INJECTION, EMULSION INTRAVENOUS at 07:29

## 2022-08-29 RX ADMIN — Medication 8 MCG: at 07:44

## 2022-08-29 RX ADMIN — FENTANYL CITRATE 50 MCG: 50 INJECTION, SOLUTION INTRAMUSCULAR; INTRAVENOUS at 07:44

## 2022-08-29 RX ADMIN — ONDANSETRON 4 MG: 2 INJECTION INTRAMUSCULAR; INTRAVENOUS at 07:02

## 2022-08-29 ASSESSMENT — ACTIVITIES OF DAILY LIVING (ADL)
ADLS_ACUITY_SCORE: 18

## 2022-08-29 NOTE — ANESTHESIA CARE TRANSFER NOTE
Patient: Yokasta Gary    Procedure: Procedure(s):  CREATION, GASTRIC BYPASS, ERI - EN - Y LAPAROSCOPIC       Diagnosis: Morbid obesity (H) [E66.01]  Diagnosis Additional Information: No value filed.    Anesthesia Type:   General     Note:    Oropharynx: oropharynx clear of all foreign objects and spontaneously breathing  Level of Consciousness: awake  Oxygen Supplementation: face mask  Level of Supplemental Oxygen (L/min / FiO2): 8  Independent Airway: airway patency satisfactory and stable  Dentition: dentition unchanged  Vital Signs Stable: post-procedure vital signs reviewed and stable  Report to RN Given: handoff report given  Patient transferred to: PACU  Comments: Report given to PACU RN, all questions answered.  Pt denies pain at this time.  Handoff Report: Identifed the Patient, Identified the Reponsible Provider, Reviewed the pertinent medical history, Discussed the surgical course, Reviewed Intra-OP anesthesia mangement and issues during anesthesia, Set expectations for post-procedure period and Allowed opportunity for questions and acknowledgement of understanding      Vitals:  Vitals Value Taken Time   /66 08/29/22 0900   Temp 96.6*F temporal 08/29/2022 0902   Pulse 96 08/29/22 0902   Resp 54 08/29/22 0902   SpO2 100 % 08/29/22 0902   Vitals shown include unvalidated device data.    Electronically Signed By: KEELY Gregorio CRNA  August 29, 2022  9:03 AM

## 2022-08-29 NOTE — INTERVAL H&P NOTE
"I have reviewed the surgical (or preoperative) H&P that is linked to this encounter, and examined the patient. There are no significant changes    Alex Mcgraw MD, FACS  Office: [phone]  [facility]   General and Bariatric Surgery      Clinical Conditions Present on Arrival:  Clinically Significant Risk Factors Present on Admission                   # DMII: A1C = N/A within past 3 months  # Severe Obesity: Estimated body mass index is 47.12 kg/m  as calculated from the following:    Height as of 8/10/22: 1.6 m (5' 2.99\").    Weight as of this encounter: 120.6 kg (265 lb 14.4 oz).       "

## 2022-08-29 NOTE — OP NOTE
[facility]  Operative Note    Pre-operative diagnosis: Morbid obesity (H) [E66.01]   Post-operative diagnosis Morbid obesity due to excess calories   Procedure: Procedure(s):  CREATION, GASTRIC BYPASS, ERI - EN - Y LAPAROSCOPIC   Surgeon: Alex Mcgraw MD   Assistants(s): The assistance of Karlie Cardenas CNP, was necessary for retraction and exposure during the course of the case.   Anesthesia: General    Estimated blood loss: Less than 50 ml        Operative Indication:  Yokasta Gary has a Body mass index is 47.12 kg/m . with associated co morbidities including type 2 diabetes. she has attempted weight loss through medical management, diet, and exercise alone without longterm success.  she is therefore pursuing bariatric surgery as a means of achieving long term weight loss and resolution of her bariatric comorbidities.       Drains: None   Specimens: None       Findings: None.   Complications: None.           Description of procedure:      Patient was brought to the operating room where after induction of general anesthesia with endotracheal intubation she was positioned with both arms out. She was then prepped and draped in standard sterile fashion. After a procedural pause, we began by injecting quarter percent Marcaine into the skin to the left of the umbilicus. This was then sharply incised.  Access to the abdomen was then obtained with an optical trocar.  After insufflation, additional trochars were placed across the mid abdomen under direct visualization.  A Trisha liver retractor was then placed. Bilateral TAP blocks were performed under visual guidance with 0.25% marcaine, 25 cc per side.  The patient was then put into reverse Trendelenburg body positioning.       We began our dissection by exposing the angle of His, mobilizing the phrenoesophageal fat pad off the gastroesophageal junction. With this completed we then proceeded to create a window on the lesser curvature  of the stomach into the lesser sac. We then proceeded to divide the stomach with a green load 60 mm stapler approximately 8 centimeters distal to the gastroesophageal junction. A 32 Swiss red rubber catheter was then advanced down into the stomach to size the pouch. We then created our gastric pouch with 2 additional firings of the blue load 60 mm stapler proceeding up to the angle of His. We then inspected our staple line for hemostasis which appeared excellent.     We next turned our attention to the small bowel. The ligament of Treitz was identified and measured approximately 70 cm distally.  It was then affixed to the gastric pouch with a running 2-0 Vicryl suture.  An opposing gastrotomy and enterotomy were then created, and utilizing a blue load 60 mm stapler we created a 30 mm gastroenterostomy.   The red rubber catheter was then advanced across the gastrojejunal anastomosis.  The common enterotomy was then closed with a running 2-0 Vicryl suture.  The small bowel was then divided,  the Lupe limb from the biliopancreatic limb with a firing of the white load 60 mm stapler.    We next turned our attention to the Petersons defect, which was then closed with a running 0 silk suture.     We then measured 150 cm distal to her gastrojejunal anastomosis.  This was then sutured to the biliopancreatic limb just proximal to the staple line with a 2-0 Vicryl suture.  Opposing enterotomies were then created and a 16mm enteroenterostomy created with a single firing of the white load stapler.  The common enterotomy was then closed with a running 2-0 Vicryl suture.   We next turned attention to the mesenteric defect, which was then closed with a running 0 silk suture.  We proceeded to perform a leak test.  The bowel was clamped distal to the gastrojejunal anastomosis.  After submerging this under water, our anesthesia colleagues insufflated 60 mL of air into the gastric pouch, which distended the pouch and  proximal Lupe limb without any evidence of leakage of air.  The Trisha liver retractor was then removed under direct visualization.  Ports were removed and the skin was then closed with running and interrupted 4-0 Vicryl suture.        Alex Mcgraw MD, FACS  Office: [phone]  [facility]   General and Bariatric Surgery

## 2022-08-29 NOTE — ANESTHESIA POSTPROCEDURE EVALUATION
Patient: Yokasta Gary    Procedure: Procedure(s):  CREATION, GASTRIC BYPASS, ERI - EN - Y LAPAROSCOPIC       Anesthesia Type:  General    Note:  Disposition: Outpatient   Postop Pain Control: Uneventful            Sign Out: Well controlled pain   PONV: No   Neuro/Psych: Uneventful            Sign Out: Acceptable/Baseline neuro status   Airway/Respiratory: Uneventful            Sign Out: Acceptable/Baseline resp. status   CV/Hemodynamics: Uneventful            Sign Out: Acceptable CV status; No obvious hypovolemia; No obvious fluid overload   Other NRE: NONE   DID A NON-ROUTINE EVENT OCCUR? No           Last vitals:  Vitals Value Taken Time   /63 08/29/22 0945   Temp 36.2  C (97.2  F) 08/29/22 0945   Pulse 93 08/29/22 0951   Resp 18 08/29/22 0945   SpO2 94 % 08/29/22 0951   Vitals shown include unvalidated device data.    Electronically Signed By: Mark David MD  August 29, 2022  11:40 AM

## 2022-08-29 NOTE — PHARMACY-ADMISSION MEDICATION HISTORY
Pharmacy Note - Admission Medication History    Pertinent Provider Information: Omeprazole, Actigall and chewable multivitamin to start post procedure.   ______________________________________________________________________    Prior To Admission (PTA) med list completed and updated in EMR.       PTA Med List   Medication Sig Last Dose     aprepitant (EMEND) 40 MG capsule Take 1 capsule (40 mg) by mouth once for 1 dose Take on the morning of surgery, prior to arrival at surgery center. 8/29/2022 at am     Cyanocobalamin (B-12) 1000 MCG SUBL Take 3 days weekly (Patient taking differently: Take 1,000 mcg by mouth daily Take 3 days weekly) 8/26/2022     hyoscyamine (LEVSIN/SL) 0.125 MG sublingual tablet Place 1 tablet (0.125 mg) under the tongue every 4 hours as needed for cramping Past Month     levonorgestrel (MIRENA, 52 MG,) 20 MCG/24HR IUD 1 each by Intrauterine route once      metFORMIN (GLUCOPHAGE) 500 MG tablet Start one tablet with supper for 2-3 weeks then increase if tolerating it to one tablet, twice daily with breakfast and supper. (Patient taking differently: Take 500 mg by mouth 2 times daily (with meals) Start one tablet with supper for 2-3 weeks then increase if tolerating it to one tablet, twice daily with breakfast and supper.) 8/27/2022     [START ON 8/30/2022] omeprazole (PRILOSEC) 20 MG DR capsule Take 1 capsule (20 mg) by mouth daily for 90 days Start day after surgery, open contents and sprinkle on food for first 6 weeks. Take daily for 3 months after surgery.      ondansetron (ZOFRAN ODT) 4 MG ODT tab Take 1 tablet (4 mg) by mouth every 8 hours as needed for nausea Past Month     Pediatric Multivitamins-Iron (MULTIVITAMINS PLUS IRON CHILD) 18 MG CHEW Take 1 chew tab by mouth 2 times daily Ok to substitute with any chewable that contains 18 mg of iron, Vitamin A, Thiamine and Zinc.      [START ON 9/12/2022] ursodiol (ACTIGALL) 300 MG capsule Take 1 capsule (300 mg) by mouth 2 times daily for 180  days Start 2 weeks after surgery, do not open-take with warm liquid. Take twice a day for 6 months.        Information source(s): Patient    Method of interview communication: in-person    Patient was asked about OTC/herbal products specifically.  PTA med list reflects this.    Based on the pharmacist's assessment, the PTA med list information appears reliable    Allergies were reviewed, assessed, and updated with the patient.      Patient does not use any multi-dose medications prior to admission.     Thank you for the opportunity to participate in the care of this patient.      Mildred Miller RPH     8/29/2022     6:38 AM

## 2022-08-29 NOTE — ANESTHESIA PROCEDURE NOTES
Airway       Patient location during procedure: OR       Procedure Start/Stop Times: 8/29/2022 7:33 AM  Staff -        Anesthesiologist:  Mark David MD       CRNA: Meek Engel APRN CRNA       Performed By: CRNA  Consent for Airway        Urgency: elective  Indications and Patient Condition       Indications for airway management: ignacio-procedural       Induction type:intravenous       Mask difficulty assessment: 3 - difficult mask (inadequate, unstable, or two providers) +/- NMBA    Final Airway Details       Final airway type: endotracheal airway       Successful airway: ETT - single and Oral  Endotracheal Airway Details        ETT size (mm): 7.0       Cuffed: yes       Successful intubation technique: video laryngoscopy       VL Blade Size: Glidescope 3       Grade View of Cords: 1       Adjucts: stylet       Position: Right       Measured from: gums/teeth       Secured at (cm): 21       Bite block used: None    Post intubation assessment        Placement verified by: capnometry, equal breath sounds and chest rise        Number of attempts at approach: 1       Number of other approaches attempted: 0       Secured with: silk tape       Ease of procedure: easy       Dentition: Intact and Unchanged    Medication(s) Administered   Medication Administration Time: 8/29/2022 7:33 AM

## 2022-08-29 NOTE — DISCHARGE INSTRUCTIONS
"Diet:      Liquids should be at room temperature for the next week.  Continue to drink from a 30 mL medicine cups (don t drink directly from a glass or cup) for the next 4 days.  NO ice chips, carbonation, or straws.     Until you meet with the dietician tomorrow morning, you will be on a zero-calorie, non-carbonated, and decaffeinated clear liquid diet.  It is extremely important to follow the liquid diet to allow for optimal healing and to prevent food from becoming lodged at the pouch outlet.     All medications should be crushed or cut to 1/4\" or less. Capsules need to be opened and sprinkled in liquid or food unless otherwise specified by your bariatric team. You will cut, crush or open your pills for the first 6 weeks after surgery. After 6 weeks, you can start taking whole pills/capsules again.           "

## 2022-08-29 NOTE — DISCHARGE SUMMARY
Bariatric Surgery Discharge Summary    Primary Care Physician:  Dennise Ruff    Discharge Provider: KEELY Richardson CNP  Admission Date: 8/29/2022  Discharge Date: 8/29/2022     Primary Diagnosis at Discharge:   Patient Active Problem List   Diagnosis     Morbid obesity (H)     Type 2 diabetes mellitus without complication, without long-term current use of insulin (H)     Morbid (severe) obesity due to excess calories (H)      Disposition: Home or Self Care  Condition at Discharge: Stable     Surgery: Laparoscopic Lillie-en-y Gastric Bypass    Hospital Summary:   Yokasta Gary was admitted for morbid obesity.  she then underwent an uncomplicated laparoscopic lillie-en-y gastric bypass.  Following a brief recovery in the PACU, she was transferred to Phase II care for the remainder of her stay.  her post operative course was uneventful and she was discharged home the same day of surgery tolerating a clear liquid diet, ambulating without assistance, and pain controlled with oral analgesics.        Discharge Medications:      Medication List      Started    gabapentin 250 MG/5ML solution  Commonly known as: NEURONTIN  250 mg, Oral, EVERY 8 HOURS     traMADol 50 MG tablet  Commonly known as: ULTRAM  50 mg, Oral, EVERY 6 HOURS PRN     Tylenol Dissolve Packs 500 MG Pack  Generic drug: Acetaminophen  1,000 mg, Oral, EVERY 6 HOURS        Modified    B-12 1000 MCG Subl  Take 3 days weekly  What changed:     how much to take    how to take this    when to take this     metFORMIN 500 MG tablet  Commonly known as: GLUCOPHAGE  Start taking on: August 31, 2022  What changed:     additional instructions    These instructions start on August 31, 2022. If you are unsure what to do until then, ask your doctor or other care provider.        Discontinued    aprepitant 40 MG capsule  Commonly known as: EMEND            Discharge Instructions:  Follow up appointment with Primary Care Physician: Dennise Ruff  Follow up  appointment with Dr. Mcgraw in 2 weeks  Follow up with dietician tomorrow morning as scheduled     Post operative instructions:   Diet:     Until you meet with the dietician tomorrow morning, you will be on a zero-calorie, non-carbonated, and decaffeinated clear liquid diet.  It is extremely important to follow the liquid diet to allow for optimal healing and to prevent food from becoming lodged at the pouch outlet.    Protein is an important part of the diet after surgery; therefore, it is necessary to drink fluids that are high in protein.  Proteins are building blocks that help you heal after surgery and help preserve your muscle mass while you are losing weight. You will likely start your protein shakes after advancing to a full liquid diet on post-op day 1 (after seeing the dietician).    Remember to take 1 Multivitamin with Iron 2 times daily and 1000 mcg of Sublingual B-12 daily.     Aim for 40-50 grams of protein daily during this week.    Sip 48-64 ounces of liquid per day in addition to full liquid meals/supplements.    After 2 weeks of the full liquid diet, you will advance to the pureed diet, as instructed. This will be described in detail during your post-op dietary class next week.    Activity: You should continue to be active at home including ambulating frequently.  If possible try to limit the amount of time spent in bed.    Restrictions: you should avoid lifting anything more than 20 pounds or strenuous physical activity for a total of 2 weeks.  This is to help reduce the risk of hernia  formation at your port sites.  Walking does not count as strenuous physical activity.  You are safe to walk up and down stairs.  Following 2 weeks he may resume all normal physical activity.    Wound / drain care:You may remove your outer dressings after a period of 48 hours.  The small white strips on the incisions act like artificial scabs, and will begin to peel at the edges at around 7-10 days.  These can then  be removed.     Bathing: You may shower after 48 hours from surgery.  It is ok to get your incisions wet, but avoid rubbing them.  Avoid soaking in bath tubs, or swimming in lakes, pools, or streams for 4 weeks following surgery.      Karlie Cardenas, CNP  [phone]  [facility]

## 2022-08-29 NOTE — ANESTHESIA PREPROCEDURE EVALUATION
Anesthesia Pre-Procedure Evaluation    Patient: Yokasta Gary   MRN: [id number] : 1985        Procedure : Procedure(s):  CREATION, GASTRIC BYPASS, LAPAROSCOPIC with Erector Spinae block          Past Medical History:   Diagnosis Date     COVID-19     Marita 2021.     Dyspepsia     rare gaviscon use     Insulin controlled gestational diabetes mellitus (GDM) in second trimester 2018     Morbid obesity (H)      Type 2 diabetes mellitus without complication, without long-term current use of insulin (H) 2022    hx of gestational diabetes, A1c now 6.8% 2022 as she enters bariatric surgery program.      Past Surgical History:   Procedure Laterality Date     ANTERIOR CRUCIATE LIGAMENT REPAIR Left       SECTION N/A 10/17/2014    Procedure:  SECTION;  Surgeon: Maureen Chanel MD;  Location: [facility];  Service:       SECTION N/A 9/10/2018    Procedure:  SECTION, REPEAT;  Surgeon: Dorota Guzman MD;  Location: [facility];  Service:       No Known Allergies   Social History     Tobacco Use     Smoking status: Former Smoker     Quit date: 2009     Years since quittin.0     Smokeless tobacco: Never Used   Substance Use Topics     Alcohol use: No      Wt Readings from Last 1 Encounters:   08/10/22 125.6 kg (277 lb)        Anesthesia Evaluation   Pt has had prior anesthetic.     No history of anesthetic complications       ROS/MED HX  ENT/Pulmonary:     (+) tobacco use, Past use,     Neurologic:  - neg neurologic ROS     Cardiovascular:  - neg cardiovascular ROS     METS/Exercise Tolerance: >4 METS    Hematologic:  - neg hematologic  ROS     Musculoskeletal:  - neg musculoskeletal ROS     GI/Hepatic:  - neg GI/hepatic ROS     Renal/Genitourinary:  - neg Renal ROS     Endo:     (+) type II DM, Obesity (BMI 49),     Psychiatric/Substance Use:       Infectious Disease:       Malignancy:       Other:            Physical Exam    Airway         Mallampati: II   TM distance: > 3 FB   Neck ROM: full   Mouth opening: > 3 cm    Respiratory Devices and Support         Dental  no notable dental history         Cardiovascular   cardiovascular exam normal          Pulmonary   pulmonary exam normal                OUTSIDE LABS:  CBC:   Lab Results   Component Value Date    WBC 8.3 08/10/2022    WBC 7.2 02/04/2022    HGB 14.8 08/10/2022    HGB 15.0 02/04/2022    HCT 42.7 08/10/2022    HCT 43.2 02/04/2022     08/10/2022     02/04/2022     BMP:   Lab Results   Component Value Date     08/10/2022     02/04/2022    POTASSIUM 4.1 08/10/2022    POTASSIUM 3.9 02/04/2022    CHLORIDE 101 08/10/2022    CHLORIDE 103 02/04/2022    CO2 26 08/10/2022    CO2 25 02/04/2022    BUN 8.3 08/10/2022    BUN 8 02/04/2022    CR 0.70 08/10/2022    CR 0.75 02/04/2022     (H) 08/10/2022     (H) 02/04/2022     COAGS:   Lab Results   Component Value Date    PTT 31 08/10/2022    INR 1.10 08/10/2022     POC: No results found for: BGM, HCG, HCGS  HEPATIC:   Lab Results   Component Value Date    ALBUMIN 4.4 08/10/2022    PROTTOTAL 6.8 08/10/2022    ALT 42 (H) 08/10/2022    AST 31 08/10/2022    ALKPHOS 77 08/10/2022    BILITOTAL 0.8 08/10/2022     OTHER:   Lab Results   Component Value Date    A1C 6.9 (H) 08/10/2022    PAUL 9.1 08/10/2022    TSH 1.23 02/04/2022    T4 0.96 02/04/2022       Anesthesia Plan    ASA Status:  3   NPO Status:  NPO Appropriate    Anesthesia Type: General.     - Airway: ETT   Induction: Intravenous, Propofol.   Maintenance: TIVA.        Consents    Anesthesia Plan(s) and associated risks, benefits, and realistic alternatives discussed. Questions answered and patient/representative(s) expressed understanding.     - Discussed: Risks, Benefits and Alternatives for BOTH SEDATION and the PROCEDURE were discussed     - Discussed with:  Patient      - Extended Intubation/Ventilatory Support Discussed: No.      - Patient is DNR/DNI Status: No     Use of blood products discussed: Yes.     - Discussed with: Patient.     - Consented: consented to blood products            Reason for refusal: other.     Postoperative Care    Pain management: Multi-modal analgesia.   PONV prophylaxis: Ondansetron (or other 5HT-3), Dexamethasone or Solumedrol, Scopolamine patch, Aprepitant     Comments:    Other Comments: TIVA    Glidescope    Surgeon will inject local  Magnesium  Acetamiophen    Reverse with Sugammadex            Mark David MD

## 2022-08-30 ENCOUNTER — VIRTUAL VISIT (OUTPATIENT)
Dept: SURGERY | Facility: CLINIC | Age: 37
End: 2022-08-30
Payer: COMMERCIAL

## 2022-08-30 DIAGNOSIS — Z71.3 NUTRITIONAL COUNSELING: ICD-10-CM

## 2022-08-30 DIAGNOSIS — Z98.84 BARIATRIC SURGERY STATUS: Primary | ICD-10-CM

## 2022-08-30 PROCEDURE — 99024 POSTOP FOLLOW-UP VISIT: CPT | Performed by: DIETITIAN, REGISTERED

## 2022-08-30 NOTE — PROGRESS NOTES
Met with patient for one day post op video visit. Reviewed full liquid diet materials- encouraged minimum of 48oz water or calorie free beverages as well as at least 40-50g protein each day. Also discussed the importance of adding in a bit of carbohydrate with applesauce, greek yogurt, diluted fruit juice, etc. Written materials on the full liquid diet were provided.     Also discussed supplements, patient will start taking chewable multivitamin and B12 today, and will start calcium and vitamin D in 3-5 weeks.     Patient is doing well, moving around and drinking from medicine cup every 15 min or so.     Patient will follow up with RD for 1 week post op class.

## 2022-08-30 NOTE — LETTER
8/30/2022         RE: Yokasta Gary  [address]  [place] [address]        Dear Colleague,    Thank you for referring your patient, Yokasta Gary, to the [facility] SUR[facility]ERY [facility] AND BARIATRICS CARE [place]. Please see a copy of my visit note below.    Met with patient for one day post op video visit. Reviewed full liquid diet materials- encouraged minimum of 48oz water or calorie free beverages as well as at least 40-50g protein each day. Also discussed the importance of adding in a bit of carbohydrate with applesauce, greek yogurt, diluted fruit juice, etc. Written materials on the full liquid diet were provided.     Also discussed supplements, patient will start taking chewable multivitamin and B12 today, and will start calcium and vitamin D in 3-5 weeks.     Patient is doing well, moving around and drinking from medicine cup every 15 min or so.     Patient will follow up with RD for 1 week post op class.         Again, thank you for allowing me to participate in the care of your patient.        Sincerely,        ONEIDA PHAN RD

## 2022-08-31 ENCOUNTER — TELEPHONE (OUTPATIENT)
Dept: SURGERY | Facility: CLINIC | Age: 37
End: 2022-08-31

## 2022-08-31 ENCOUNTER — PATIENT OUTREACH (OUTPATIENT)
Dept: CARE COORDINATION | Facility: CLINIC | Age: 37
End: 2022-08-31

## 2022-08-31 NOTE — TELEPHONE ENCOUNTER
Post-Op Phone Call  [facility] Bariatric Care    Surgeon: Alex Mcgraw MD.  Date of Surgery: 8/29/2022  Discharge Date: 8/29/2022    Date/Time Called:   Date: 8/31/2022 Time: 3:40 PM   Attempt: First    Pain Control:  Intensity: Moderate (4 - 5)  Duration/Location/Explain: cramping off and on left side along with gas pains  What makes it better/worse? Will try using her abdominal binder when she sleeps and maybe some ice packs    Medications:  Narcotic Use - No  - but has the tramadol if needed.  Drug type: Gabapentin -   Frequency: stopped taking that because it was making her nauseated, haven't needed anything other than the Tylenol    Non-prescription pain control: Tylenol    Other medications currently taking: See medication list for details - medication list reviewed    Complete Multivitamin + Iron BID? Yes    Vitamin B12? sublingual daily    Incisions:  Drainage? clean and dry  Comment: very flushed face, chest and arms since yesterday but seems to be a little better today.    Intake/Output:  Fluid Intake(oz/day)? 25 oz of water so far  Fluid type? 2 Protein shakes, water, applesauce and yogurt    Heartburn? No  Counseling: Call if reflux, or pain with eating and drinking start  Nausea? Yes  Vomiting? Yes - foam with gabapentin, but none since  BM? Yes  Gas? Yes    Voiding Frequency? 4 or more/day     Are you using your incentive spirometer? If yes, how often? 3+/day    Any fever type symptoms? No    Walking activity? Yes  Frequency/Type: walking    In Preparation for Surgery:    Is there anything you would have changed about your preparation for surgery from our clinic? If yes, what? No, her  went through it before her so it seems familiar.    On a scale of 1-5, with 5 being the highest, how was the service while you were in the hospital? 5    Is/was there anyone in particular; nurse, aide, hospital staff, that did a great job and you would like us to recognize? If yes, whom. Everyone was really  great from start to finish!    Reminder of plan and expectations for follow-up visits given to the patient.    Thank you for your time. Please do not hesitate to call us with any questions or concerns.    Call completed by: Margarita Blackwell RN

## 2022-08-31 NOTE — PROGRESS NOTES
[facility]    Background: Transitional Care Management program auto-identified and prompting a chart review by [facility] team.    Assessment: Upon chart review, [facility] Team member will cancel/close this episode of Transitional Care Management program due to reason below:    Patient has active communication with a nurse, provider or care team for reason of post-hospital follow up plan.  Outreach call by [facility] team not indicated to minimize duplicative efforts.     Plan: Transitional Care Management episode closed per reason above.      Arabella Urban MA  [facility] Care Resource [facility]    *Connected Care Resource Team does NOT follow patient ongoing. Referrals are identified based on internal discharge reports and the outreach is to ensure patient has an understanding of their discharge instructions.

## 2022-09-06 ENCOUNTER — ALLIED HEALTH/NURSE VISIT (OUTPATIENT)
Dept: SURGERY | Facility: CLINIC | Age: 37
End: 2022-09-06
Payer: COMMERCIAL

## 2022-09-06 DIAGNOSIS — Z71.3 NUTRITIONAL COUNSELING: ICD-10-CM

## 2022-09-06 DIAGNOSIS — Z98.84 BARIATRIC SURGERY STATUS: Primary | ICD-10-CM

## 2022-09-06 PROCEDURE — 99024 POSTOP FOLLOW-UP VISIT: CPT

## 2022-09-06 NOTE — PROGRESS NOTES
Time in: 10:00a   /Time out: 10:40a      Pt presents for 1 week post op dietitian follow up. Reports tolerating full liquids well. Denies n/v, constipation/diarrhea, or significant pain. Taking MVI and SL B12 appropriately. Taking 60-70 oz fluid/day and 60 grams protein. Educated pt on pureed and soft to bariatric regular diets. Provided grocery list and sample meal plan for each diet stage.  Pt will begin pureed diet on 9-6-22 and advance as tolerated to softs/bariatric regular on 9-21-22. Instructed pt to begin 500 mg calcium citrate BID and 5000IU Vitamin D3 on 9-21-22. Pt to follow up with RD at 3 months post op.

## 2022-09-09 ENCOUNTER — VIRTUAL VISIT (OUTPATIENT)
Dept: SURGERY | Facility: CLINIC | Age: 37
End: 2022-09-09
Payer: COMMERCIAL

## 2022-09-09 DIAGNOSIS — Z98.890 POST-OPERATIVE STATE: Primary | ICD-10-CM

## 2022-09-09 PROCEDURE — 99024 POSTOP FOLLOW-UP VISIT: CPT | Performed by: SURGERY

## 2022-09-09 RX ORDER — CHOLECALCIFEROL (VITAMIN D3) 25 MCG
TABLET,CHEWABLE ORAL
COMMUNITY
Start: 2022-02-09 | End: 2023-04-11

## 2022-09-09 NOTE — LETTER
9/9/2022         RE: Yokasta Gary  [address]  [place] [address]        Dear Colleague,    Thank you for referring your patient, Yokasta Gary, to the [facility] SUR[facility]ERY [facility] AND BARIATRICS CARE [place]. Please see a copy of my visit note below.    Yokasta Gary is 37 year old  female who presents for a billable video visit today.    How would you like to obtain your AVS? MyChart  If dropped from the video visit, the video invitation should be resent by: Text to cell phone: [phone]  Will anyone else be joining your video visit? No      Video Start Time: 8:15 AM    Are there any specific questions or needs that you would like addressed at your visit today? 2wk Post Op     Provider Notes:   HPI: Pt is here for follow up of a lap gastric bypass. She is doing well. Taking po well, estimating she is getting in 50+ oz of fluid. No vomiting.  No pain with drinking or eating purees. No fevers or chills. Ambulating without problems. Weight at 253 lbs today.    Current Outpatient Medications   Medication Sig Dispense Refill     Cyanocobalamin (B-12) 1000 MCG LOZG TAKE 1 TABLET 3 DAYS WEEKLY       levonorgestrel (MIRENA, 52 MG,) 20 MCG/24HR IUD 1 each by Intrauterine route once       omeprazole (PRILOSEC) 20 MG DR capsule Take 1 capsule (20 mg) by mouth daily for 90 days Start day after surgery, open contents and sprinkle on food for first 6 weeks. Take daily for 3 months after surgery. 90 capsule 0     Pediatric Multivitamins-Iron (MULTIVITAMINS PLUS IRON CHILD) 18 MG CHEW Take 1 chew tab by mouth 2 times daily Ok to substitute with any chewable that contains 18 mg of iron, Vitamin A, Thiamine and Zinc. 180 tablet 3     Cyanocobalamin (B-12) 1000 MCG SUBL Take 3 days weekly (Patient taking differently: Take 1,000 mcg by mouth daily Take 3 days weekly) 150 tablet 1     gabapentin (NEURONTIN) 250 MG/5ML solution Take 5 mLs (250 mg) by mouth every 8 hours for 4 days 60 mL 0     hyoscyamine  (LEVSIN/SL) 0.125 MG sublingual tablet Place 1 tablet (0.125 mg) under the tongue every 4 hours as needed for cramping (Patient not taking: Reported on 9/9/2022) 30 tablet 1     metFORMIN (GLUCOPHAGE) 500 MG tablet Start one tablet with supper for 2-3 weeks then increase if tolerating it to one tablet, twice daily with breakfast and supper. Can restart 8/31/22 (Patient not taking: Reported on 9/9/2022)       ondansetron (ZOFRAN ODT) 4 MG ODT tab Take 1 tablet (4 mg) by mouth every 8 hours as needed for nausea (Patient not taking: Reported on 9/9/2022) 12 tablet 1     traMADol (ULTRAM) 50 MG tablet Take 1 tablet (50 mg) by mouth every 6 hours as needed for severe pain (if gabapentin and tylenol are ineffective) (Patient not taking: Reported on 9/9/2022) 8 tablet 0     [START ON 9/12/2022] ursodiol (ACTIGALL) 300 MG capsule Take 1 capsule (300 mg) by mouth 2 times daily for 180 days Start 2 weeks after surgery, do not open-take with warm liquid. Take twice a day for 6 months. (Patient not taking: Reported on 9/9/2022) 180 capsule 1         LMP 08/29/2022 (Exact Date)   Wt Readings from Last 4 Encounters:   08/29/22 120.6 kg (265 lb 14.4 oz)   08/10/22 125.6 kg (277 lb)   08/09/22 126.1 kg (278 lb)   06/30/22 124.7 kg (275 lb)         There is no height or weight on file to calculate BMI.    EXAM:  GENERAL:Appears well  ABDOMEN: Incisions healing well      Assessment/Plan: Pt s/p lap gastric bypass. Doing well. Diet and activity discussed, she can resume any exercise that she would like at this point. She will f/u with us at the Bariatric Center in 1 month to meet with our dietician, and again at 3 months for additional follow up.    Alex Mcgraw MD, FACS  Office: [phone]  [facility]   General and Bariatric Surgery      Video-Visit Details    Type of service:  Video Visit    Video End Time (time video stopped): 0825  Originating Location (pt. Location): Home    Distant Location (provider location):  M  [facility] SURGERY CLINIC AND BARIATRICS CARE [place]     Platform used for Video Visit: Cedric      Again, thank you for allowing me to participate in the care of your patient.        Sincerely,        Alex Mcgraw MD

## 2022-09-09 NOTE — PROGRESS NOTES
Yokasta Gary is 37 year old  female who presents for a billable video visit today.    How would you like to obtain your AVS? MyChart  If dropped from the video visit, the video invitation should be resent by: Text to cell phone: [phone]  Will anyone else be joining your video visit? No      Video Start Time: 8:15 AM    Are there any specific questions or needs that you would like addressed at your visit today? 2wk Post Op     Provider Notes:   HPI: Pt is here for follow up of a lap gastric bypass. She is doing well. Taking po well, estimating she is getting in 50+ oz of fluid. No vomiting.  No pain with drinking or eating purees. No fevers or chills. Ambulating without problems. Weight at 253 lbs today.    Current Outpatient Medications   Medication Sig Dispense Refill     Cyanocobalamin (B-12) 1000 MCG LOZG TAKE 1 TABLET 3 DAYS WEEKLY       levonorgestrel (MIRENA, 52 MG,) 20 MCG/24HR IUD 1 each by Intrauterine route once       omeprazole (PRILOSEC) 20 MG DR capsule Take 1 capsule (20 mg) by mouth daily for 90 days Start day after surgery, open contents and sprinkle on food for first 6 weeks. Take daily for 3 months after surgery. 90 capsule 0     Pediatric Multivitamins-Iron (MULTIVITAMINS PLUS IRON CHILD) 18 MG CHEW Take 1 chew tab by mouth 2 times daily Ok to substitute with any chewable that contains 18 mg of iron, Vitamin A, Thiamine and Zinc. 180 tablet 3     Cyanocobalamin (B-12) 1000 MCG SUBL Take 3 days weekly (Patient taking differently: Take 1,000 mcg by mouth daily Take 3 days weekly) 150 tablet 1     gabapentin (NEURONTIN) 250 MG/5ML solution Take 5 mLs (250 mg) by mouth every 8 hours for 4 days 60 mL 0     hyoscyamine (LEVSIN/SL) 0.125 MG sublingual tablet Place 1 tablet (0.125 mg) under the tongue every 4 hours as needed for cramping (Patient not taking: Reported on 9/9/2022) 30 tablet 1     metFORMIN (GLUCOPHAGE) 500 MG tablet Start one tablet with supper for 2-3 weeks then increase if  tolerating it to one tablet, twice daily with breakfast and supper. Can restart 8/31/22 (Patient not taking: Reported on 9/9/2022)       ondansetron (ZOFRAN ODT) 4 MG ODT tab Take 1 tablet (4 mg) by mouth every 8 hours as needed for nausea (Patient not taking: Reported on 9/9/2022) 12 tablet 1     traMADol (ULTRAM) 50 MG tablet Take 1 tablet (50 mg) by mouth every 6 hours as needed for severe pain (if gabapentin and tylenol are ineffective) (Patient not taking: Reported on 9/9/2022) 8 tablet 0     [START ON 9/12/2022] ursodiol (ACTIGALL) 300 MG capsule Take 1 capsule (300 mg) by mouth 2 times daily for 180 days Start 2 weeks after surgery, do not open-take with warm liquid. Take twice a day for 6 months. (Patient not taking: Reported on 9/9/2022) 180 capsule 1         LMP 08/29/2022 (Exact Date)   Wt Readings from Last 4 Encounters:   08/29/22 120.6 kg (265 lb 14.4 oz)   08/10/22 125.6 kg (277 lb)   08/09/22 126.1 kg (278 lb)   06/30/22 124.7 kg (275 lb)         There is no height or weight on file to calculate BMI.    EXAM:  GENERAL:Appears well  ABDOMEN: Incisions healing well      Assessment/Plan: Pt s/p lap gastric bypass. Doing well. Diet and activity discussed, she can resume any exercise that she would like at this point. She will f/u with us at the Bariatric Center in 1 month to meet with our dietician, and again at 3 months for additional follow up.    Alex Mcgraw MD, FACS  Office: [phone]  [facility]   General and Bariatric Surgery      Video-Visit Details    Type of service:  Video Visit    Video End Time (time video stopped): 0825  Originating Location (pt. Location): Home    Distant Location (provider location):  [facility] SURGERY CLINIC AND BARIATRICS CARE [place]     Platform used for Video Visit: Cedric

## 2022-09-25 ENCOUNTER — HEALTH MAINTENANCE LETTER (OUTPATIENT)
Age: 37
End: 2022-09-25

## 2022-10-03 ENCOUNTER — VIRTUAL VISIT (OUTPATIENT)
Dept: SURGERY | Facility: CLINIC | Age: 37
End: 2022-10-03
Payer: COMMERCIAL

## 2022-10-03 DIAGNOSIS — Z98.84 BARIATRIC SURGERY STATUS: Primary | ICD-10-CM

## 2022-10-03 DIAGNOSIS — Z71.3 NUTRITIONAL COUNSELING: ICD-10-CM

## 2022-10-03 PROCEDURE — 99024 POSTOP FOLLOW-UP VISIT: CPT | Performed by: DIETITIAN, REGISTERED

## 2022-10-03 NOTE — PROGRESS NOTES
Pt presents for 1 month post op dietitian follow up visit. Reports tolerating soft food diet well. Denies n/v, constipation/diarrhea, or significant pain. Taking MVI and Vitamin B12 appropriately.Instructed pt to begin taking 500 mg calcium citrate BID and 5000 IU Vitamin D3. Educated pt on soft to bariatric regular diets, medications, developing an exercise regimen, and other dietary points of care. Provided  Education handout on items discussed. Pt to follow up with RD at 3 months post op.   -two protein shakes, yogurt at breakfast, tuna at lunch, experimenting more with dinner meals  -a lot more active- peloton 4 days per week, walking, hiking    -mentioned eczema flare up around incisions- steroid cream not working, encouraged patient to send message to either Dr. Bray or Dr. Ruff regarding this    Have you been to the hospital or seen by a doctor for any reason since your surgery? no

## 2022-10-03 NOTE — LETTER
10/3/2022         RE: Yokasta Gary  [address]  [place] [address]        Dear Colleague,    Thank you for referring your patient, Yokasta Gary, to the [facility] AND BARIATRICS [facility]. Please see a copy of my visit note below.      Pt presents for 1 month post op dietitian follow up visit. Reports tolerating soft food diet well. Denies n/v, constipation/diarrhea, or significant pain. Taking MVI and Vitamin B12 appropriately.Instructed pt to begin taking 500 mg calcium citrate BID and 5000 IU Vitamin D3. Educated pt on soft to bariatric regular diets, medications, developing an exercise regimen, and other dietary points of care. Provided  Education handout on items discussed. Pt to follow up with CLAIRE at 3 months post op.   -two protein shakes, yogurt at breakfast, tuna at lunch, experimenting more with dinner meals  -a lot more active- peloton 4 days per week, walking, hiking    -mentioned eczema flare up around incisions- steroid cream not working, encouraged patient to send message to either Dr. Bray or Dr. Ruff regarding this    Have you been to the hospital or seen by a doctor for any reason since your surgery? no          Again, thank you for allowing me to participate in the care of your patient.        Sincerely,        ONEIDA PHAN RD

## 2022-11-28 ENCOUNTER — VIRTUAL VISIT (OUTPATIENT)
Dept: SURGERY | Facility: CLINIC | Age: 37
End: 2022-11-28
Payer: COMMERCIAL

## 2022-11-28 DIAGNOSIS — Z98.84 BARIATRIC SURGERY STATUS: Primary | ICD-10-CM

## 2022-11-28 DIAGNOSIS — K91.2 POSTOPERATIVE MALABSORPTION: ICD-10-CM

## 2022-11-28 DIAGNOSIS — Z71.3 NUTRITIONAL COUNSELING: ICD-10-CM

## 2022-11-28 PROCEDURE — 97803 MED NUTRITION INDIV SUBSEQ: CPT | Mod: 95 | Performed by: DIETITIAN, REGISTERED

## 2022-11-28 NOTE — PROGRESS NOTES
"Yokasta Gary is a 37 year old who is being evaluated via a billable video visit.    How would you like to obtain your AVS? MyChart  If the video visit is dropped, the invitation should be resent by: Send to e-mail at: [email or web address]  Will anyone else be joining your video visit? No  {If patient encounters technical issues they should call [phone]      Post-op Surgical Weight Loss Diet Evaluation     Assessment:  Pt presents for 3 months post-op RD visit, s/p RNYGB on 8/29/2022 with Dr. Mcgraw. Today we reviewed current eating habits and level of physical activity, and instructed on the changes that are required for successful bariatric outcomes.    Patient Progress: going really well, no major concerns    Initial weight: 277 lbs  Current weight: 227 lbs  Weight change: 50 lbs (down)    There is no height or weight on file to calculate BMI.    Patient Active Problem List   Diagnosis     Morbid obesity (H)     Type 2 diabetes mellitus without complication, without long-term current use of insulin (H)     Morbid (severe) obesity due to excess calories (H)       Diabetes: yes    Vitamins   Multi Vit with Iron: yes  Calcium Citrate: yes  B12: yes  D3: yes    Do you experience hunger? yes  Do you have \"dumping\" syndrome?unsure   Do you experience any reflux or discomfort with eating? No    Diet Recall/Time:   Breakfast: Protein Shake or Yogurt, Apple   Lunch: soup, cottage cheese or apple   Dinner: protein (chicken or pork chops), veggies (cooked), brown rice or baby potatoes    Typical Snacks: hummus, [employer] Protein Chips, Ratio Yogurt with Apples    Proteins/Veg/Fruits/CHO (NOT well tolerated): None     Estimated protein intake: 60-80 grams    Estimated portion size per meals:1 cup/meal      Meal Duration:20-30 minutes/meal    Fluid-meal separation:  Fluids are  30min before and 30 minutes after meals.    Fluid Intake  Water: at least 96 oz/day (flavoring here and there)   Caffeine: 1-2 " "cups/day       Exercise: Walking, Weight Lifting, Pelaton, HIIT Training-shoots for daily for 30-45 minutes      PES statement:      (NC-1.4) Altered GI Function related to Alteration in gastrointestinal tract structure and/or function/ Decreased functional length of the GI tract as evidenced by Weight loss of 18% initial body weight; Gastric bypass surgery    Intervention    Discussion  1. Discussed 3 Months Post-Op Nutritional Guidelines for RNY GB  2. Recommended to consume 15-20gm protein at 3 meals daily, along with protein supplement/\"planned protein containing snack\" of 15-30gm protein, to reach goal of 60-80 gm protein daily.  3. Educated on post-op vitamin regimen: Multi Vit + iron 2x/day, calcium citrate 400-600 mg 2x/day, 1059-0989 mcg of Sublingual B-12 daily, and 5000 IU Vitamin D3 daily (MVI and calcium can be taken at the same time BID)  4. Reviewed lean protein sources  5. Bariatric Plate Method-  including lean/low fat protein at each meal, including a vegetable/fruit, and limiting carbohydrate intake to less than 25% of plate volume.     Instructions  1. Include 15-20gm protein at each meal, along with protein supplement/\"planned protein containing snack\" of 15-30gm protein, to reach goal of 60-80 gm protein daily.  2. Increase fluid intake to 64oz daily: choose plain or calorie/carbonation-free beverages.  3. Incorporate daily structured activity, 30-60 minutes most days of the week  4. Recommended pt to start taking: Multi Vit + iron 2x/day, calcium citrate 400-600 mg 2x/day, 1359-5962 mcg of Sublingual B-12 daily, and 5000 IU Vitamin D3 daily. (MVI and calcium can be taken at the same time)  5. Read food labels more consistently: keeping total fat grams <10, total sugar grams <10, fiber >3gm per serving.  6. Increase vegetable/fruit intake, by having a vegetable or fruit with each meal daily.  7. Practice plate method: 1/2 plate lean/low fat protein source, vegetable/fruit, <25% of plate complex " carbohydrates.  8. Separate fluids 30 minutes before/after meal times.  9. Practice eating off of smaller plates/bowls, chewing to applesauce consistency, taking 20-30 minutes to eat in a calm/relaxed environment without distractions of tv/email/cell phone.    Handouts provided:  3 months Post-Op Nutritional Guidelines for RNY GB    Assessment/Plan:    Pt to follow up for 6 months post-op visit with bariatrician       Video-Visit Details    Type of service:  Video Visit    Video Start Time (time video started): 12:21 pm    Video End Time (time video stopped): 12:45 PM    Originating Location (pt. Location): Home        Distant Location (provider location):  Off-site    Mode of Communication:  Video Conference via [facility]    Physician has received verbal consent for a Video Visit from the patient? Yes    Kate Bocanegra, RD

## 2022-11-28 NOTE — LETTER
"    11/28/2022         RE: Yokasta Gary  [address] [facility] [address]        Dear Colleague,    Thank you for referring your patient, Yokasta Gary, to the [facility] SURGERY CLINIC AND BARIATRICS CARE [place]. Please see a copy of my visit note below.    Yokasta Gary is a 37 year old who is being evaluated via a billable video visit.    How would you like to obtain your AVS? MyChart  If the video visit is dropped, the invitation should be resent by: Send to e-mail at: [email or web address]  Will anyone else be joining your video visit? No  {If patient encounters technical issues they should call [phone]      Post-op Surgical Weight Loss Diet Evaluation     Assessment:  Pt presents for 3 months post-op RD visit, s/p RNYGB on 8/29/2022 with Dr. Mcgraw. Today we reviewed current eating habits and level of physical activity, and instructed on the changes that are required for successful bariatric outcomes.    Patient Progress: going really well, no major concerns    Initial weight: 277 lbs  Current weight: 227 lbs  Weight change: 50 lbs (down)    There is no height or weight on file to calculate BMI.    Patient Active Problem List   Diagnosis     Morbid obesity (H)     Type 2 diabetes mellitus without complication, without long-term current use of insulin (H)     Morbid (severe) obesity due to excess calories (H)       Diabetes: yes    Vitamins   Multi Vit with Iron: yes  Calcium Citrate: yes  B12: yes  D3: yes    Do you experience hunger? yes  Do you have \"dumping\" syndrome?unsure   Do you experience any reflux or discomfort with eating? No    Diet Recall/Time:   Breakfast: Protein Shake or Yogurt, Apple   Lunch: soup, cottage cheese or apple   Dinner: protein (chicken or pork chops), veggies (cooked), brown rice or baby potatoes    Typical Snacks: hummus, [employer] Protein Chips, Ratio Yogurt with Apples    Proteins/Veg/Fruits/CHO (NOT well tolerated): None     Estimated protein " "intake: 60-80 grams    Estimated portion size per meals:1 cup/meal      Meal Duration:20-30 minutes/meal    Fluid-meal separation:  Fluids are  30min before and 30 minutes after meals.    Fluid Intake  Water: at least 96 oz/day (flavoring here and there)   Caffeine: 1-2 cups/day       Exercise: Walking, Weight Lifting, Pelaton, HIIT Training-shoots for daily for 30-45 minutes      PES statement:      (NC-1.4) Altered GI Function related to Alteration in gastrointestinal tract structure and/or function/ Decreased functional length of the GI tract as evidenced by Weight loss of 18% initial body weight; Gastric bypass surgery    Intervention    Discussion  1. Discussed 3 Months Post-Op Nutritional Guidelines for RNY GB  2. Recommended to consume 15-20gm protein at 3 meals daily, along with protein supplement/\"planned protein containing snack\" of 15-30gm protein, to reach goal of 60-80 gm protein daily.  3. Educated on post-op vitamin regimen: Multi Vit + iron 2x/day, calcium citrate 400-600 mg 2x/day, 9560-9060 mcg of Sublingual B-12 daily, and 5000 IU Vitamin D3 daily (MVI and calcium can be taken at the same time BID)  4. Reviewed lean protein sources  5. Bariatric Plate Method-  including lean/low fat protein at each meal, including a vegetable/fruit, and limiting carbohydrate intake to less than 25% of plate volume.     Instructions  1. Include 15-20gm protein at each meal, along with protein supplement/\"planned protein containing snack\" of 15-30gm protein, to reach goal of 60-80 gm protein daily.  2. Increase fluid intake to 64oz daily: choose plain or calorie/carbonation-free beverages.  3. Incorporate daily structured activity, 30-60 minutes most days of the week  4. Recommended pt to start taking: Multi Vit + iron 2x/day, calcium citrate 400-600 mg 2x/day, 7050-1955 mcg of Sublingual B-12 daily, and 5000 IU Vitamin D3 daily. (MVI and calcium can be taken at the same time)  5. Read food labels more " consistently: keeping total fat grams <10, total sugar grams <10, fiber >3gm per serving.  6. Increase vegetable/fruit intake, by having a vegetable or fruit with each meal daily.  7. Practice plate method: 1/2 plate lean/low fat protein source, vegetable/fruit, <25% of plate complex carbohydrates.  8. Separate fluids 30 minutes before/after meal times.  9. Practice eating off of smaller plates/bowls, chewing to applesauce consistency, taking 20-30 minutes to eat in a calm/relaxed environment without distractions of tv/email/cell phone.    Handouts provided:  3 months Post-Op Nutritional Guidelines for RNY GB    Assessment/Plan:    Pt to follow up for 6 months post-op visit with bariatrician       Video-Visit Details    Type of service:  Video Visit    Video Start Time (time video started): 12:21 pm    Video End Time (time video stopped): 12:45 PM    Originating Location (pt. Location): Home        Distant Location (provider location):  Off-site    Mode of Communication:  Video Conference via [facility]    Physician has received verbal consent for a Video Visit from the patient? Yes    Kate Bocanegra RD              Again, thank you for allowing me to participate in the care of your patient.        Sincerely,        Kate Bocanegra RD

## 2022-12-01 ENCOUNTER — ALLIED HEALTH/NURSE VISIT (OUTPATIENT)
Dept: FAMILY MEDICINE | Facility: CLINIC | Age: 37
End: 2022-12-01
Payer: COMMERCIAL

## 2022-12-01 DIAGNOSIS — Z23 NEEDS FLU SHOT: Primary | ICD-10-CM

## 2022-12-01 PROCEDURE — 99207 PR NO CHARGE NURSE ONLY: CPT

## 2022-12-01 PROCEDURE — 90686 IIV4 VACC NO PRSV 0.5 ML IM: CPT

## 2022-12-01 PROCEDURE — 90471 IMMUNIZATION ADMIN: CPT

## 2023-02-04 ENCOUNTER — HEALTH MAINTENANCE LETTER (OUTPATIENT)
Age: 38
End: 2023-02-04

## 2023-03-16 ENCOUNTER — MYC MEDICAL ADVICE (OUTPATIENT)
Dept: SURGERY | Facility: CLINIC | Age: 38
End: 2023-03-16
Payer: COMMERCIAL

## 2023-03-16 DIAGNOSIS — Z98.84 S/P BARIATRIC SURGERY: ICD-10-CM

## 2023-03-16 DIAGNOSIS — E11.9 TYPE 2 DIABETES MELLITUS WITHOUT COMPLICATION, WITHOUT LONG-TERM CURRENT USE OF INSULIN (H): Primary | ICD-10-CM

## 2023-03-16 DIAGNOSIS — K91.2 POSTOPERATIVE MALABSORPTION: ICD-10-CM

## 2023-04-10 ENCOUNTER — LAB (OUTPATIENT)
Dept: LAB | Facility: CLINIC | Age: 38
End: 2023-04-10
Payer: COMMERCIAL

## 2023-04-10 DIAGNOSIS — Z98.84 S/P BARIATRIC SURGERY: ICD-10-CM

## 2023-04-10 DIAGNOSIS — E11.9 TYPE 2 DIABETES MELLITUS WITHOUT COMPLICATION, WITHOUT LONG-TERM CURRENT USE OF INSULIN (H): ICD-10-CM

## 2023-04-10 DIAGNOSIS — K91.2 POSTOPERATIVE MALABSORPTION: ICD-10-CM

## 2023-04-10 LAB
ALBUMIN SERPL BCG-MCNC: 4.2 G/DL (ref 3.5–5.2)
ALP SERPL-CCNC: 91 U/L (ref 35–104)
ALT SERPL W P-5'-P-CCNC: 26 U/L (ref 10–35)
ANION GAP SERPL CALCULATED.3IONS-SCNC: 11 MMOL/L (ref 7–15)
AST SERPL W P-5'-P-CCNC: 27 U/L (ref 10–35)
BILIRUB SERPL-MCNC: 0.8 MG/DL
BUN SERPL-MCNC: 7 MG/DL (ref 6–20)
CALCIUM SERPL-MCNC: 9.3 MG/DL (ref 8.6–10)
CHLORIDE SERPL-SCNC: 105 MMOL/L (ref 98–107)
CHOLEST SERPL-MCNC: 136 MG/DL
CREAT SERPL-MCNC: 0.72 MG/DL (ref 0.51–0.95)
DEPRECATED HCO3 PLAS-SCNC: 25 MMOL/L (ref 22–29)
ERYTHROCYTE [DISTWIDTH] IN BLOOD BY AUTOMATED COUNT: 12 % (ref 10–15)
FERRITIN SERPL-MCNC: 122 NG/ML (ref 6–175)
FOLATE SERPL-MCNC: 29.6 NG/ML (ref 4.6–34.8)
GFR SERPL CREATININE-BSD FRML MDRD: >90 ML/MIN/1.73M2
GLUCOSE SERPL-MCNC: 91 MG/DL (ref 70–99)
HBA1C MFR BLD: 5 % (ref 0–5.6)
HCT VFR BLD AUTO: 44.8 % (ref 35–47)
HDLC SERPL-MCNC: 40 MG/DL
HGB BLD-MCNC: 15.2 G/DL (ref 11.7–15.7)
LDLC SERPL CALC-MCNC: 73 MG/DL
MCH RBC QN AUTO: 28.5 PG (ref 26.5–33)
MCHC RBC AUTO-ENTMCNC: 33.9 G/DL (ref 31.5–36.5)
MCV RBC AUTO: 84 FL (ref 78–100)
NONHDLC SERPL-MCNC: 96 MG/DL
PLATELET # BLD AUTO: 219 10E3/UL (ref 150–450)
POTASSIUM SERPL-SCNC: 4.5 MMOL/L (ref 3.4–5.3)
PROT SERPL-MCNC: 6.8 G/DL (ref 6.4–8.3)
PTH-INTACT SERPL-MCNC: 25 PG/ML (ref 15–65)
RBC # BLD AUTO: 5.33 10E6/UL (ref 3.8–5.2)
SODIUM SERPL-SCNC: 141 MMOL/L (ref 136–145)
TRIGL SERPL-MCNC: 115 MG/DL
VIT B12 SERPL-MCNC: 1897 PG/ML (ref 232–1245)
WBC # BLD AUTO: 6.9 10E3/UL (ref 4–11)

## 2023-04-10 PROCEDURE — 84630 ASSAY OF ZINC: CPT | Mod: 90

## 2023-04-10 PROCEDURE — 84425 ASSAY OF VITAMIN B-1: CPT | Mod: 90

## 2023-04-10 PROCEDURE — 36415 COLL VENOUS BLD VENIPUNCTURE: CPT

## 2023-04-10 PROCEDURE — 82607 VITAMIN B-12: CPT

## 2023-04-10 PROCEDURE — 82728 ASSAY OF FERRITIN: CPT

## 2023-04-10 PROCEDURE — 84590 ASSAY OF VITAMIN A: CPT | Mod: 90

## 2023-04-10 PROCEDURE — 82746 ASSAY OF FOLIC ACID SERUM: CPT

## 2023-04-10 PROCEDURE — 80053 COMPREHEN METABOLIC PANEL: CPT

## 2023-04-10 PROCEDURE — 82306 VITAMIN D 25 HYDROXY: CPT

## 2023-04-10 PROCEDURE — 83970 ASSAY OF PARATHORMONE: CPT

## 2023-04-10 PROCEDURE — 80061 LIPID PANEL: CPT

## 2023-04-10 PROCEDURE — 83036 HEMOGLOBIN GLYCOSYLATED A1C: CPT

## 2023-04-10 PROCEDURE — 85027 COMPLETE CBC AUTOMATED: CPT

## 2023-04-10 PROCEDURE — 99000 SPECIMEN HANDLING OFFICE-LAB: CPT

## 2023-04-11 LAB
DEPRECATED CALCIDIOL+CALCIFEROL SERPL-MC: <44 UG/L (ref 20–75)
VITAMIN D2 SERPL-MCNC: <5 UG/L
VITAMIN D3 SERPL-MCNC: 39 UG/L
ZINC SERPL-MCNC: 82.3 UG/DL

## 2023-04-13 LAB
ANNOTATION COMMENT IMP: NORMAL
RETINYL PALMITATE SERPL-MCNC: <0.02 MG/L
VIT A SERPL-MCNC: 0.54 MG/L

## 2023-04-14 ENCOUNTER — OFFICE VISIT (OUTPATIENT)
Dept: SURGERY | Facility: CLINIC | Age: 38
End: 2023-04-14
Payer: COMMERCIAL

## 2023-04-14 VITALS
BODY MASS INDEX: 36.5 KG/M2 | WEIGHT: 206 LBS | SYSTOLIC BLOOD PRESSURE: 114 MMHG | DIASTOLIC BLOOD PRESSURE: 66 MMHG | HEIGHT: 63 IN

## 2023-04-14 DIAGNOSIS — K91.2 POSTOPERATIVE MALABSORPTION: Primary | ICD-10-CM

## 2023-04-14 DIAGNOSIS — M25.551 HIP PAIN, BILATERAL: ICD-10-CM

## 2023-04-14 DIAGNOSIS — Z98.84 S/P BARIATRIC SURGERY: ICD-10-CM

## 2023-04-14 DIAGNOSIS — E11.9 TYPE 2 DIABETES MELLITUS WITHOUT COMPLICATION, WITHOUT LONG-TERM CURRENT USE OF INSULIN (H): ICD-10-CM

## 2023-04-14 DIAGNOSIS — M25.552 HIP PAIN, BILATERAL: ICD-10-CM

## 2023-04-14 LAB — VIT B1 PYROPHOSHATE BLD-SCNC: 164 NMOL/L

## 2023-04-14 PROCEDURE — 99214 OFFICE O/P EST MOD 30 MIN: CPT | Performed by: EMERGENCY MEDICINE

## 2023-04-14 RX ORDER — IBUPROFEN 200 MG
1 CAPSULE ORAL 2 TIMES DAILY
COMMUNITY

## 2023-04-14 RX ORDER — CHOLECALCIFEROL (VITAMIN D3) 50 MCG
1 TABLET ORAL DAILY
COMMUNITY

## 2023-04-14 NOTE — LETTER
4/14/2023         RE: Yokasta Gary  [address]  [place] [address]        Dear Colleague,    Thank you for referring your patient, Yokasta Gary, to the [facility] AND BARIATRICS [facility] [place]. Please see a copy of my visit note below.    Bariatric Follow Up Visit with a History of Previous Bariatric Surgery     Date of visit: 4/14/2023  Physician: North Bray MD, MD  Primary Care Provider:  Dennise Ruff  Yokasta Gary   38 year old  female    Date of Surgery: 8/29/22  Initial Weight: 280 lbs  Initial BMI: 49.6  Today's Weight:   Wt Readings from Last 1 Encounters:   04/14/23 93.4 kg (206 lb)     Weight history:   Wt Readings from Last 4 Encounters:   04/14/23 93.4 kg (206 lb)   08/29/22 120.6 kg (265 lb 14.4 oz)   08/10/22 125.6 kg (277 lb)   08/09/22 126.1 kg (278 lb)      Body mass index is 36.5 kg/m .      Assessment and Plan     Assessment: Yokasta is a 38 year old year old female who is 6 months s/p  Lupe en Y Gastric Bypass with Dr. Mcgraw.  She is overall doing, weight is down 74 lbs from her introductory weight, a 26.4% total body weight reduction which is right on track. 4/10/23 labs show good vitamin support and dramatically improved A1c, now at 5% (down from newly diabetic at time of intake evaluation at 6.9%), metformin has been discontinued for her diabetes. She can reduce frequency of B12 given some modest excess supplementation levels. Lipids are much improved with LDL of 73mg/dl.    Yokasta Gary feels as if she has achieved the goals she hoped to accomplish through bariatric surgery and weight loss.    Encounter Diagnoses   Name Primary?     Postoperative malabsorption Yes     Hip pain, bilateral      S/P bariatric surgery          Current Outpatient Medications:      calcium citrate (CITRACAL) 950 (200 Ca) MG tablet, Take 1 tablet by mouth 2 times daily, Disp: , Rfl:      Cyanocobalamin (B-12) 1000 MCG SUBL, Take 3 days weekly (Patient taking  "differently: Take 1,000 mcg by mouth daily Take 3 days weekly), Disp: 150 tablet, Rfl: 1     levonorgestrel (MIRENA, 52 MG,) 20 MCG/24HR IUD, 1 each by Intrauterine route once, Disp: , Rfl:      Pediatric Multivitamins-Iron (MULTIVITAMINS PLUS IRON CHILD) 18 MG CHEW, Take 1 chew tab by mouth 2 times daily Ok to substitute with any chewable that contains 18 mg of iron, Vitamin A, Thiamine and Zinc., Disp: 180 tablet, Rfl: 3     vitamin D3 (CHOLECALCIFEROL) 50 mcg (2000 units) tablet, Take 1 tablet by mouth daily, Disp: , Rfl:     Plan:    1. Great work on over 26% total body weight reduction thus far.   2. Pelvis xray/hips to make sure no stress fractures. If OK then will refer for PT for stabilization of pelvis/hip muscles.   3. Follow up in 3 months w/ dietician and with me in 6 months.  Return in about 6 months (around 10/14/2023).    Bariatric Surgery Review     Interim History/LifeChanges: doing well.     Patient Concerns: none  Appetite (1-10): good control. Will sneeze if eats too fast.  GERD: resolved w/ gastric bypass. Formerly once weekly prior to surgery..    Reviewed whether any need/indication for screening EGD today and we will deferred.  Typically, a screening EGD is recommend post op year 2-3 if no symptoms to assess health of esophagus/bariatric surgery and sooner if difficult to control GERD or persistent pain/dysphagia sx despite behavior modification.    Medication changes: off metformin.    Vitamin Intake:   B-12   yes [employer] twice dosing    MVI  yes: norm complete with Iron   Vitamin D  yes   Calcium   yes     Other  n/a              LABS: \"Reviewed    Nausea no  Vomiting only rarely if eating too fast.  Constipation no  Diarrhea no  Rashes few in skin creases, better drying.   Hair Loss a lot.   Reactive Hypoglycemia none.  Light Headedness \"few\" after standing too fast or inadequate water intake. After GI illness in [place] felt lightheaded   Moods a little more anxious/sensitive. Menses " "more irregular since weight loss.       Most recent labs:  Lab Results   Component Value Date    WBC 6.9 04/10/2023    HGB 15.2 04/10/2023    HCT 44.8 04/10/2023    MCV 84 04/10/2023     04/10/2023     Lab Results   Component Value Date    CHOL 136 04/10/2023     Lab Results   Component Value Date    HDL 40 (L) 04/10/2023     No components found for: LDLCALC  Lab Results   Component Value Date    TRIG 115 04/10/2023     No results found for: CHOLHDL  Lab Results   Component Value Date    ALT 26 04/10/2023    AST 27 04/10/2023    ALKPHOS 91 04/10/2023     No results found for: HGBA1C  Lab Results   Component Value Date    B12 1,897 (H) 04/10/2023     No components found for: VITDT1  Lab Results   Component Value Date    RADHA 122 04/10/2023     Lab Results   Component Value Date    PTHI 25 04/10/2023     Lab Results   Component Value Date    ZN 82.3 04/10/2023     Lab Results   Component Value Date    VIB1WB 164 04/10/2023     Lab Results   Component Value Date    TSH 1.23 02/04/2022     No results found for: TEST    Habits:  Tobacco/Nicotine/THC exposure? no   NSAID use? no   Alcohol use? Rare. Feels it faster.    Caffeine Habits? no       Exercise Routine: yes, gym  3 meals/day? yes  Protein 60-80g/day? yes  Water Separate from meals? yes  Calorie Containing Beverages: no  Restaurant eating/wk: 3-4  Sleep Habits:  good  CPAP Use? never  Contraception: IUD, tubal  DEXA:n/a.  Discussed annual screening to start at age 45 and continue to age 55 if scoring \"low risk\". DEXA scan recommended at age 55 regardless as long as at least 2 years have transpired from their bariatric surgery.    Social History     Social History     Socioeconomic History     Marital status:      Spouse name: Not on file     Number of children: Not on file     Years of education: Not on file     Highest education level: Not on file   Occupational History     Not on file   Tobacco Use     Smoking status: Former     Types: Cigarettes    "  Quit date: 2009     Years since quittin.6     Smokeless tobacco: Never   Vaping Use     Vaping status: Not on file   Substance and Sexual Activity     Alcohol use: Yes     Comment: one per month     Drug use: No     Sexual activity: Yes     Partners: Male     Birth control/protection: I.U.D., Female Surgical   Other Topics Concern     Not on file   Social History Narrative     Not on file     Social Determinants of Health     Financial Resource Strain: Not on file   Food Insecurity: Not on file   Transportation Needs: Not on file   Physical Activity: Not on file   Stress: Not on file   Social Connections: Not on file   Intimate Partner Violence: Not on file   Housing Stability: Not on file       Past Medical History     Past Medical History:   Diagnosis Date     COVID-2021.     Dyspepsia     rare gaviscon use     Insulin controlled gestational diabetes mellitus (GDM) in second trimester 2018     Morbid obesity (H)      Type 2 diabetes mellitus without complication, without long-term current use of insulin (H) 2022    hx of gestational diabetes, A1c now 6.8% 2022 as she enters bariatric surgery program.     Past Surgical History:   Procedure Laterality Date     ANTERIOR CRUCIATE LIGAMENT REPAIR Left       SECTION N/A 10/17/2014    Procedure:  SECTION;  Surgeon: Maureen Chanel MD;  Location: [facility];  Service:       SECTION N/A 9/10/2018    Procedure:  SECTION, REPEAT;  Surgeon: Dorota Guzman MD;  Location: [facility];  Service:      LAPAROSCOPIC BYPASS GASTRIC N/A 2022    Procedure: CREATION, GASTRIC BYPASS, ERI - EN - Y LAPAROSCOPIC;  Surgeon: Alex Mcgraw MD;  [facility]       Problem List     Patient Active Problem List   Diagnosis     Morbid obesity (H)     Type 2 diabetes mellitus without complication, without long-term current use of insulin (H)     Morbid (severe) obesity due to  "excess calories (H)     Medications     [unfilled]  Surgical History     Past Surgical History  She has a past surgical history that includes  Section (N/A, 10/17/2014); Anterior Cruciate Ligament Repair (Left);  Section (N/A, 9/10/2018); and Laparoscopic bypass gastric (N/A, 2022).    Objective-Exam     Constitutional:  /66 (BP Location: Right arm)   Ht 1.6 m (5' 2.99\")   Wt 93.4 kg (206 lb)   BMI 36.50 kg/m    [unfilled]   General:  Pleasant and in no acute distress   Eyes:  EOMI  ENT:  Airway patent.    Neck:  Respiratory: Normal respiratory effort, no cough, .  CV:  RRR  Gastrointestinal: non-tender/flat  Musculoskeletal: muscle mass WNL  Skin: color without pallor,  hair thick/long,   Psychiatric: alert and oriented X3, mood and affect normal.    Counseling     We reviewed the important post op bariatric recommendations:  -eating 3 meals daily  -eating protein first, getting >60gm protein daily  -eating slowly, chewing food well  -avoiding/limiting calorie containing beverages  -drinking water 15-30 minutes before or after meals  -limiting restaurant or cafeteria eating to twice a week or less    We discussed the importance of restorative sleep and stress management in maintaining a healthy weight.  We discussed the National Weight Control Registry healthy weight maintenance strategies and ways to optimize metabolism.  We discussed the importance of physical activity including cardiovascular and strength training in maintaining a healthier weight.    We discussed the importance of life-long vitamin supplementation and life-long  follow-up.    Yokasta was reminded that, to avoid marginal ulcers she should avoid tobacco at all, alcohol in excess, caffeine in excess, and NSAIDS (unless indicated for cardioprotection or othewise and opposed by a PPI).    North Bray MD    [facility].  2023  7:42 AM  [phone] (clinic phone)  [phone] " (fax)    No images are attached to the encounter.  Medical Decision Makin minutes spent by me on the date of the encounter doing chart review, history and exam, documentation and further activities per the note      Again, thank you for allowing me to participate in the care of your patient.        Sincerely,        North Bray MD

## 2023-04-14 NOTE — PROGRESS NOTES
Bariatric Follow Up Visit with a History of Previous Bariatric Surgery     Date of visit: 4/14/2023  Physician: North Bray MD, MD  Primary Care Provider:  Dennise Ruff Amkarina Gary   38 year old  female    Date of Surgery: 8/29/22  Initial Weight: 280 lbs  Initial BMI: 49.6  Today's Weight:   Wt Readings from Last 1 Encounters:   04/14/23 93.4 kg (206 lb)     Weight history:   Wt Readings from Last 4 Encounters:   04/14/23 93.4 kg (206 lb)   08/29/22 120.6 kg (265 lb 14.4 oz)   08/10/22 125.6 kg (277 lb)   08/09/22 126.1 kg (278 lb)      Body mass index is 36.5 kg/m .      Assessment and Plan     Assessment: Yokasta is a 38 year old year old female who is 6 months s/p  Lupe en Y Gastric Bypass with Dr. Mcgraw.  She is overall doing, weight is down 74 lbs from her introductory weight, a 26.4% total body weight reduction which is right on track. 4/10/23 labs show good vitamin support and dramatically improved A1c, now at 5% (down from newly diabetic at time of intake evaluation at 6.9%), metformin has been discontinued for her diabetes. She can reduce frequency of B12 given some modest excess supplementation levels. Lipids are much improved with LDL of 73mg/dl.    Yokasta Gary feels as if she has achieved the goals she hoped to accomplish through bariatric surgery and weight loss.    Encounter Diagnoses   Name Primary?     Postoperative malabsorption Yes     Hip pain, bilateral      S/P bariatric surgery          Current Outpatient Medications:      calcium citrate (CITRACAL) 950 (200 Ca) MG tablet, Take 1 tablet by mouth 2 times daily, Disp: , Rfl:      Cyanocobalamin (B-12) 1000 MCG SUBL, Take 3 days weekly (Patient taking differently: Take 1,000 mcg by mouth daily Take 3 days weekly), Disp: 150 tablet, Rfl: 1     levonorgestrel (MIRENA, 52 MG,) 20 MCG/24HR IUD, 1 each by Intrauterine route once, Disp: , Rfl:      Pediatric Multivitamins-Iron (MULTIVITAMINS PLUS IRON CHILD) 18 MG CHEW, Take 1 chew tab  "by mouth 2 times daily Ok to substitute with any chewable that contains 18 mg of iron, Vitamin A, Thiamine and Zinc., Disp: 180 tablet, Rfl: 3     vitamin D3 (CHOLECALCIFEROL) 50 mcg (2000 units) tablet, Take 1 tablet by mouth daily, Disp: , Rfl:     Plan:    1. Great work on over 26% total body weight reduction thus far.   2. Pelvis xray/hips to make sure no stress fractures. If OK then will refer for PT for stabilization of pelvis/hip muscles.   3. Follow up in 3 months w/ dietician and with me in 6 months.  Return in about 6 months (around 10/14/2023).    Bariatric Surgery Review     Interim History/LifeChanges: doing well.     Patient Concerns: none  Appetite (1-10): good control. Will sneeze if eats too fast.  GERD: resolved w/ gastric bypass. Formerly once weekly prior to surgery..    Reviewed whether any need/indication for screening EGD today and we will deferred.  Typically, a screening EGD is recommend post op year 2-3 if no symptoms to assess health of esophagus/bariatric surgery and sooner if difficult to control GERD or persistent pain/dysphagia sx despite behavior modification.    Medication changes: off metformin.    Vitamin Intake:   B-12   yes [employer] twice dosing    MVI  yes: norm complete with Iron   Vitamin D  yes   Calcium   yes     Other  n/a              LABS: \"Reviewed    Nausea no  Vomiting only rarely if eating too fast.  Constipation no  Diarrhea no  Rashes few in skin creases, better drying.   Hair Loss a lot.   Reactive Hypoglycemia none.  Light Headedness \"few\" after standing too fast or inadequate water intake. After GI illness in [place] felt lightheaded   Moods a little more anxious/sensitive. Menses more irregular since weight loss.       Most recent labs:  Lab Results   Component Value Date    WBC 6.9 04/10/2023    HGB 15.2 04/10/2023    HCT 44.8 04/10/2023    MCV 84 04/10/2023     04/10/2023     Lab Results   Component Value Date    CHOL 136 04/10/2023     Lab Results " "  Component Value Date    HDL 40 (L) 04/10/2023     No components found for: LDLCALC  Lab Results   Component Value Date    TRIG 115 04/10/2023     No results found for: CHOLHDL  Lab Results   Component Value Date    ALT 26 04/10/2023    AST 27 04/10/2023    ALKPHOS 91 04/10/2023     No results found for: HGBA1C  Lab Results   Component Value Date    B12 1,897 (H) 04/10/2023     No components found for: VITDT1  Lab Results   Component Value Date    RADHA 122 04/10/2023     Lab Results   Component Value Date    PTHI 25 04/10/2023     Lab Results   Component Value Date    ZN 82.3 04/10/2023     Lab Results   Component Value Date    VIB1WB 164 04/10/2023     Lab Results   Component Value Date    TSH 1.23 2022     No results found for: TEST    Habits:  Tobacco/Nicotine/THC exposure? no   NSAID use? no   Alcohol use? Rare. Feels it faster.    Caffeine Habits? no       Exercise Routine: yes, gym  3 meals/day? yes  Protein 60-80g/day? yes  Water Separate from meals? yes  Calorie Containing Beverages: no  Restaurant eating/wk: 3-4  Sleep Habits:  good  CPAP Use? never  Contraception: IUD, tubal  DEXA:n/a.  Discussed annual screening to start at age 45 and continue to age 55 if scoring \"low risk\". DEXA scan recommended at age 55 regardless as long as at least 2 years have transpired from their bariatric surgery.    Social History     Social History     Socioeconomic History     Marital status:      Spouse name: Not on file     Number of children: Not on file     Years of education: Not on file     Highest education level: Not on file   Occupational History     Not on file   Tobacco Use     Smoking status: Former     Types: Cigarettes     Quit date: 2009     Years since quittin.6     Smokeless tobacco: Never   Vaping Use     Vaping status: Not on file   Substance and Sexual Activity     Alcohol use: Yes     Comment: one per month     Drug use: No     Sexual activity: Yes     Partners: Male     Birth " control/protection: I.U.D., Female Surgical   Other Topics Concern     Not on file   Social History Narrative     Not on file     Social Determinants of Health     Financial Resource Strain: Not on file   Food Insecurity: Not on file   Transportation Needs: Not on file   Physical Activity: Not on file   Stress: Not on file   Social Connections: Not on file   Intimate Partner Violence: Not on file   Housing Stability: Not on file       Past Medical History     Past Medical History:   Diagnosis Date     COVID-19     [place] 2021.     Dyspepsia     rare gaviscon use     Insulin controlled gestational diabetes mellitus (GDM) in second trimester 2018     Morbid obesity (H)      Type 2 diabetes mellitus without complication, without long-term current use of insulin (H) 2022    hx of gestational diabetes, A1c now 6.8% 2022 as she enters bariatric surgery program.     Past Surgical History:   Procedure Laterality Date     ANTERIOR CRUCIATE LIGAMENT REPAIR Left       SECTION N/A 10/17/2014    Procedure:  SECTION;  Surgeon: Maureen Chanel MD;  Location: [facility];  Service:       SECTION N/A 9/10/2018    Procedure:  SECTION, REPEAT;  Surgeon: Dorota Guzman MD;  Location: [facility];  Service:      LAPAROSCOPIC BYPASS GASTRIC N/A 2022    Procedure: CREATION, GASTRIC BYPASS, ERI - EN - Y LAPAROSCOPIC;  Surgeon: Alex Mcgraw MD;  [place] [facility]       Problem List     Patient Active Problem List   Diagnosis     Morbid obesity (H)     Type 2 diabetes mellitus without complication, without long-term current use of insulin (H)     Morbid (severe) obesity due to excess calories (H)     Medications     [unfilled]  Surgical History     Past Surgical History  She has a past surgical history that includes  Section (N/A, 10/17/2014); Anterior Cruciate Ligament Repair (Left);  Section (N/A, 9/10/2018); and  "Laparoscopic bypass gastric (N/A, 2022).    Objective-Exam     Constitutional:  /66 (BP Location: Right arm)   Ht 1.6 m (5' 2.99\")   Wt 93.4 kg (206 lb)   BMI 36.50 kg/m    [unfilled]   General:  Pleasant and in no acute distress   Eyes:  EOMI  ENT:  Airway patent.    Neck:  Respiratory: Normal respiratory effort, no cough, .  CV:  RRR  Gastrointestinal: non-tender/flat  Musculoskeletal: muscle mass WNL  Skin: color without pallor,  hair thick/long,   Psychiatric: alert and oriented X3, mood and affect normal.    Counseling     We reviewed the important post op bariatric recommendations:  -eating 3 meals daily  -eating protein first, getting >60gm protein daily  -eating slowly, chewing food well  -avoiding/limiting calorie containing beverages  -drinking water 15-30 minutes before or after meals  -limiting restaurant or cafeteria eating to twice a week or less    We discussed the importance of restorative sleep and stress management in maintaining a healthy weight.  We discussed the National Weight Control Registry healthy weight maintenance strategies and ways to optimize metabolism.  We discussed the importance of physical activity including cardiovascular and strength training in maintaining a healthier weight.    We discussed the importance of life-long vitamin supplementation and life-long  follow-up.    Yokasta was reminded that, to avoid marginal ulcers she should avoid tobacco at all, alcohol in excess, caffeine in excess, and NSAIDS (unless indicated for cardioprotection or othewise and opposed by a PPI).    North Bray MD    [facility].  2023  7:42 AM  [phone] (clinic phone)  [phone] (fax)    No images are attached to the encounter.  Medical Decision Makin minutes spent by me on the date of the encounter doing chart review, history and exam, documentation and further activities per the note  "

## 2023-04-14 NOTE — PATIENT INSTRUCTIONS
Plan:  Great work on over 26% total body weight reduction thus far.   Pelvis xray/hips to make sure no stress fractures. If OK then will refer for PT for stabilization of pelvis/hip muscles.   Follow up in 3 months w/ dietician and with me in 6 months.    [facility]  Nutritional Guidelines  Gastric Bypass 6 Months Post Op    General Guidelines and Helpful Hints:  Eat 3 meals per day + protein supplement(s). No snacks between meals.  Do not skip meals.  This can cause overeating at the next meal and will prevent adequate protein and nutritional intake.  Aim for 60-80 grams of protein per day.   Always eat your protein first. This assists with optimal nutrition and helps you stay full longer.  To achieve daily protein goals, it is recommended to drink approved protein supplement between meals.  Follow appropriate portion size: Use measuring cups to be accurate.    Months Post Op Portion Size per Meal   6 months 1/2 cup   7-8 months 1/2 - 2/3 cup   8-9 months 2/3 - 3/4 cup   10-12 months 3/4 - 1 cup   12 months and beyond 1 cup maximum     Continue to use saucer/salad plates, infant/toddler silverware to keep portion sizes small and take small bites.  Eat S-L-O-W-L-Y to make each meal last 20-30 minutes. Always stop eating when satisfied.  Continue to use caution with foods containing skins, peels or membranes. Chew well!  Aim for 64 oz. of calorie-free fluids daily.  Continue to avoid caffeine, carbonation and alcohol.  Remember to avoid drinking during meals, 15-30 minutes before and 30 minutes after.  Aim for 30-60 minutes of physical activity most days of the week.  If having trouble tolerating meat, try using a crock-pot, tinfoil tent, steamer or other moist cooking method to create tender meats. Add broth or low-fat gravy to help meat stay moist.   Avoid high sugar and high fat foods to prevent dumping syndrome.  Check nutrition labels for less than 10 grams of sugar and less than 10 grams of fat  per serving.  Continue Taking Vitamins/Minerals:  9534-5580 mcg of Sublingual B-12 daily  1 Multivitamin with Iron twice daily (chewable or swallow tabs)  500-600 mg Calcium Citrate twice daily (chewable or swallow tabs)  5000 IU Vitamin D3 daily    Sample Grocery List    Protein:  Fat free Greek or light yogurt (less than 10 grams sugar)  Fat free or low-fat cottage cheese  String cheese or reduced fat cheese slices  Tuna, salmon, crab, egg, or chicken salad made with light or fat free mayonnaise  Egg or Egg Substitute  Lean/extra lean turkey, beef, bison, venison (ground, sirloin, round, flank)  Pork loin or tenderloin (grilled, baked, broiled)  Fish such as salmon, tuna, trout, tilapia, etc. (grilled, baked, broiled)  Tender cuts of lean (skinless) turkey or chicken  Lean deli meats: turkey, lean ham, chicken, lean roast beef  Beans such as kidney, garbanzo, black, padilla, or low-fat/fat free refried beans  Peanut butter (natural preferred). Limit to 1 Tbsp. per day.  Low-fat meatloaf (made with lean ground beef or turkey)  Sloppy Joes made with low-sugar ketchup and lean ground beef or turkey  Soy or vegetable protein (i.e. vegan crumbles, soy/veggie burger, tofu)  Hummus    Vegetables:  Fresh: cooked or raw (as tolerated)  Frozen vegetables  Canned vegetables (low sodium or no salt added, rinse before cooking/eating)  (Ok to have skins/peels/membranes/seeds - just chew well)    Fruits:  Fresh fruit  Frozen fruit (no sugar added)  Canned fruit (packed in its own juice, NOT syrup)  (Ok to have skins/peels/membranes/seeds - just chew well)    Starch:  Unsweetened whole-grain hot cereal (or high fiber cold cereal, dry)  Toasted whole wheat bread or [place] Thins  Whole grain crackers  Baked/boiled/mashed potato/sweet potato  Cooked whole grain pasta, brown rice, or other cooked whole grains  Starchy vegetables: corn, peas, winter squash      Protein Supplement:   Ready to drink protein shake with:  15-30 grams  protein per serving  Less than 10 grams total carbohydrate per serving   Protein powder mixed with:   Skim or 1% milk  Low fat or fat free Lactaid milk, plain or no sugar added soymilk  Water     Fats: (use in moderation)  1 teaspoon of soft tub margarine  1 teaspoon olive oil, canola oil, or peanut oil  1 tablespoon of low-fat tobin or salad dressing     Sample Menu for 6 months after Gastric Bypass    You do NOT need to eat/drink the full portion sizes listed below  Always stop when you are satisfied  Breakfast 6 Tbsp. 1% cottage cheese   2 Tbsp. peaches    Lunch 2 oz. lean hamburger or veggie burger  1-2 tsp. salsa   Supplement Approved Protein Shake   (Have between meals throughout the day)   Dinner 6 Tbsp. chicken breast  1-2 Tbsp. green beans     Breakfast 2 Eggs or   cup scrambled egg substitute product   Lunch 7 Tbsp. low-fat S[place]ppy [place] mixture   2 high fiber crackers   Supplement Approved Protein Shake   (Have between meals throughout the day)   Dinner 6 Tbsp. grilled, broiled, or baked lemon pepper salmon  2 Tbsp. asparagus     Breakfast 6 Tbsp. light yogurt with 2 Tbsp. Grape Nuts or high fiber cereal    Lunch   cup of chili made with extra lean ground beef and kidney beans   Dinner 5 Tbsp. pork loin made in a crock pot  2 Tbsp. cooked broccoli  1 Tbsp. baked potato with 2 sprays of spray margarine    Supplement Approved Protein Shake   (Have between meals throughout the day)     Breakfast 6 Tbsp. lean ham  2 Tbsp. seedless melon   Lunch 7 Tbsp. diced turkey with 1 teaspoon low-fat gravy  1 Tbsp. green beans   Dinner 6 Tbsp. extra lean ground beef mixed with low sugar spaghetti sauce  2 Tbsp. whole wheat pasta   Supplement Approved Protein Shake   (Have between meals throughout the day)     Breakfast 2 oz turkey or soy based sausage veronica    Lunch 6 Tbsp. low-fat cottage cheese  2 Tbsp. pineapple   Supplement Approved Protein Shake   (Have between meals throughout the day)   Dinner 7 Tbsp. beef  tenderloin  1 Tbsp. asparagus     Breakfast 1/2 of a whole wheat English Muffin (toasted)  1 Tbsp. creamy natural peanut butter   Lunch   cup of black bean soup with 1 Tbsp. low fat shredded cheese   Dinner 7 Tbsp. low-fat turkey meat loaf   1 Tbsp. cooked carrots   Supplement Approved Protein Shake   (Have between meals throughout the day)     Breakfast 6 Tbsp. scrambled egg or scrambled egg substitute  1 Tbsp. finely chopped bell pepper  1 Tbsp. low fat shredded cheese   Lunch 7 Tbsp. lean diced ham  1 Tbsp. mandarin oranges   Supplement Approved Protein Shake   (Have between meals throughout the day)   Dinner 6 Tbsp. flaked fish   2 Tbsp. mashed sweet potato          LEAN PROTEIN SOURCES  Getting 20-30 grams of protein, 3 meals daily, is appropriate for most people, some need more but more than about 40 grams per meal is not useful.  General rule is drinking one ounce of water per gram of protein eaten over the course of the day:  70 grams of protein each day, drink 70 oz of water.  Protein Source Portion Calories Grams of Protein                           Nonfat, plain Greek yogurt    (10 grams sugar or less) 3/4 cup (6 oz)  12-17   Light Yogurt (10 grams sugar or less) 3/4 cup (6 oz)  6-8   Protein Shake 1 shake 110-180 15-30   Skim/1% Milk or lactose-free milk 1 cup ( 8 oz)  8   Plain or light, flavored soymilk 1 cup  7-8   Plain or light, hemp milk 1 cup 110 6   Fat Free or 1% Cottage Cheese 1/2 cup 90 15   Part skim ricotta cheese 1/2 cup 100 14   Part skim or reduced fat cheese slices 1 ounce 65-80 8     Mozzarella String Cheese 1 80 8   Canned tuna, chicken, crab or salmon  (canned in water)  1/2 cup 100 15-20   White fish (broiled, grilled, baked) 3 ounces 100 21   [place]/Tuna (broiled, grilled, baked) 3 ounces 150-180 21   Shrimp, Scallops, Lobster, Crab 3 ounces 100 21   Pork loin, Pork Tenderloin 3 ounces 150 21   Boneless, skinless chicken /turkey breast                           (broiled, grilled, baked) 3 ounces 120 21   [place], [place], [place], and Venison 3 ounces 120 21   Lean cuts of red meat and pork (sirloin,   round, tenderloin, flank, ground 93%-96%) 3 ounces 170 21   Lean or Extra Lean Ground Turkey 1/2 cup 150 20   90-95% Lean [place] Burger 1 veronica 140-180 21   Low-fat casserole with lean meat 3/4 cup 200 17   Luncheon Meats                                                        (turkey, lean ham, roast beef, chicken) 3 ounces 100 21   Egg (boiled, poached, scrambled) 1 Egg 60 7   Egg Substitute 1/2 cup 70 10   Nuts (limit to 1 serving per day)  3 Tbsp. 150 7   Nut [place] (peanut, almond)  Limit to 1 serving or less daily 1 Tbsp. 90 4   Soy Burger (varies) 1  15   Garbanzo, Black, [place] Beans 1/2 cup 110 7   Refried Beans 1/2 cup 100 7   Kidney and Lima beans 1/2 cup 110 7   Tempeh 3 oz 175 18   Vegan crumbles 1/2 cup 100 14   Tofu 1/2 cup 110 14   Chili (beans and extra lean beef or turkey) 1 cup 200 23   Lentil Stew/Soup 1 cup 150 12   Black Bean Soup 1 cup 175 12

## 2023-07-10 ENCOUNTER — TELEPHONE (OUTPATIENT)
Dept: SURGERY | Facility: CLINIC | Age: 38
End: 2023-07-10

## 2023-08-05 ENCOUNTER — HEALTH MAINTENANCE LETTER (OUTPATIENT)
Age: 38
End: 2023-08-05

## 2023-08-14 ENCOUNTER — MYC MEDICAL ADVICE (OUTPATIENT)
Dept: SURGERY | Facility: CLINIC | Age: 38
End: 2023-08-14
Payer: COMMERCIAL

## 2023-08-14 DIAGNOSIS — K91.2 POSTOPERATIVE MALABSORPTION: Primary | ICD-10-CM

## 2023-08-14 DIAGNOSIS — K91.2 POSTGASTRECTOMY MALABSORPTION: ICD-10-CM

## 2023-08-14 DIAGNOSIS — Z98.84 S/P BARIATRIC SURGERY: ICD-10-CM

## 2023-08-14 DIAGNOSIS — Z90.3 POSTGASTRECTOMY MALABSORPTION: ICD-10-CM

## 2023-08-14 NOTE — TELEPHONE ENCOUNTER
Florinda Chavez MA  P Bariatric Surgery Support [facility]  Patient has upcoming appointment for annual visit on 9/8/2023 with . Please place lab orders. Thank you!

## 2023-09-01 ENCOUNTER — LAB (OUTPATIENT)
Dept: LAB | Facility: CLINIC | Age: 38
End: 2023-09-01
Payer: COMMERCIAL

## 2023-09-01 DIAGNOSIS — K91.2 POSTOPERATIVE MALABSORPTION: ICD-10-CM

## 2023-09-01 DIAGNOSIS — K91.2 POSTGASTRECTOMY MALABSORPTION: ICD-10-CM

## 2023-09-01 DIAGNOSIS — Z98.84 S/P BARIATRIC SURGERY: ICD-10-CM

## 2023-09-01 DIAGNOSIS — Z90.3 POSTGASTRECTOMY MALABSORPTION: ICD-10-CM

## 2023-09-01 LAB
ALBUMIN SERPL BCG-MCNC: 4 G/DL (ref 3.5–5.2)
ALP SERPL-CCNC: 85 U/L (ref 35–104)
ALT SERPL W P-5'-P-CCNC: 20 U/L (ref 0–50)
ANION GAP SERPL CALCULATED.3IONS-SCNC: 9 MMOL/L (ref 7–15)
AST SERPL W P-5'-P-CCNC: 23 U/L (ref 0–45)
BILIRUB SERPL-MCNC: 1 MG/DL
BUN SERPL-MCNC: 9.1 MG/DL (ref 6–20)
CALCIUM SERPL-MCNC: 9.2 MG/DL (ref 8.6–10)
CHLORIDE SERPL-SCNC: 106 MMOL/L (ref 98–107)
CHOLEST SERPL-MCNC: 139 MG/DL
CREAT SERPL-MCNC: 0.69 MG/DL (ref 0.51–0.95)
DEPRECATED HCO3 PLAS-SCNC: 23 MMOL/L (ref 22–29)
ERYTHROCYTE [DISTWIDTH] IN BLOOD BY AUTOMATED COUNT: 11.9 % (ref 10–15)
FERRITIN SERPL-MCNC: 82 NG/ML (ref 6–175)
FOLATE SERPL-MCNC: 29.7 NG/ML (ref 4.6–34.8)
GFR SERPL CREATININE-BSD FRML MDRD: >90 ML/MIN/1.73M2
GLUCOSE SERPL-MCNC: 92 MG/DL (ref 70–99)
HBA1C MFR BLD: 5.1 % (ref 0–5.6)
HCT VFR BLD AUTO: 42.8 % (ref 35–47)
HDLC SERPL-MCNC: 49 MG/DL
HGB BLD-MCNC: 14.8 G/DL (ref 11.7–15.7)
LDLC SERPL CALC-MCNC: 72 MG/DL
MCH RBC QN AUTO: 28.7 PG (ref 26.5–33)
MCHC RBC AUTO-ENTMCNC: 34.6 G/DL (ref 31.5–36.5)
MCV RBC AUTO: 83 FL (ref 78–100)
NONHDLC SERPL-MCNC: 90 MG/DL
PLATELET # BLD AUTO: 199 10E3/UL (ref 150–450)
POTASSIUM SERPL-SCNC: 4.2 MMOL/L (ref 3.4–5.3)
PROT SERPL-MCNC: 6.4 G/DL (ref 6.4–8.3)
PTH-INTACT SERPL-MCNC: 34 PG/ML (ref 15–65)
RBC # BLD AUTO: 5.15 10E6/UL (ref 3.8–5.2)
SODIUM SERPL-SCNC: 138 MMOL/L (ref 136–145)
TRIGL SERPL-MCNC: 92 MG/DL
VIT B12 SERPL-MCNC: 1047 PG/ML (ref 232–1245)
WBC # BLD AUTO: 5.5 10E3/UL (ref 4–11)

## 2023-09-01 PROCEDURE — 36415 COLL VENOUS BLD VENIPUNCTURE: CPT

## 2023-09-01 PROCEDURE — 83970 ASSAY OF PARATHORMONE: CPT

## 2023-09-01 PROCEDURE — 80061 LIPID PANEL: CPT

## 2023-09-01 PROCEDURE — 99000 SPECIMEN HANDLING OFFICE-LAB: CPT

## 2023-09-01 PROCEDURE — 84425 ASSAY OF VITAMIN B-1: CPT | Mod: 90

## 2023-09-01 PROCEDURE — 82607 VITAMIN B-12: CPT

## 2023-09-01 PROCEDURE — 85027 COMPLETE CBC AUTOMATED: CPT

## 2023-09-01 PROCEDURE — 80053 COMPREHEN METABOLIC PANEL: CPT

## 2023-09-01 PROCEDURE — 83036 HEMOGLOBIN GLYCOSYLATED A1C: CPT

## 2023-09-01 PROCEDURE — 82728 ASSAY OF FERRITIN: CPT

## 2023-09-01 PROCEDURE — 82746 ASSAY OF FOLIC ACID SERUM: CPT

## 2023-09-01 PROCEDURE — 84630 ASSAY OF ZINC: CPT | Mod: 90

## 2023-09-01 PROCEDURE — 82306 VITAMIN D 25 HYDROXY: CPT

## 2023-09-01 PROCEDURE — 84590 ASSAY OF VITAMIN A: CPT | Mod: 90

## 2023-09-02 LAB — DEPRECATED CALCIDIOL+CALCIFEROL SERPL-MC: 41 UG/L (ref 20–75)

## 2023-09-03 LAB — ZINC SERPL-MCNC: 68.5 UG/DL

## 2023-09-04 LAB
ANNOTATION COMMENT IMP: NORMAL
RETINYL PALMITATE SERPL-MCNC: 0.02 MG/L
VIT A SERPL-MCNC: 0.64 MG/L

## 2023-09-05 LAB — VIT B1 PYROPHOSHATE BLD-SCNC: 190 NMOL/L

## 2023-09-07 NOTE — PROGRESS NOTES
Bariatric Follow Up Visit with a History of Previous Bariatric Surgery     Date of visit: 9/7/2023  Physician: North Bray MD, MD  Primary Care Provider:  Dennise Ruff  Yokasta Gary   38 year old  female    Date of Surgery: 8/29/22  Initial Weight: 280 lbs  Initial BMI: 49.6 with co-morbid recent onset DM II prior to surgery (A1c 6.8%).   Today's Weight:   Wt Readings from Last 1 Encounters:   09/08/23 91.7 kg (202 lb 1.6 oz)     Weight history:   Wt Readings from Last 4 Encounters:   09/08/23 91.7 kg (202 lb 1.6 oz)   04/14/23 93.4 kg (206 lb)   08/29/22 120.6 kg (265 lb 14.4 oz)   08/10/22 125.6 kg (277 lb)      Body mass index is 36.96 kg/m .      Assessment and Plan     Assessment: Yokasta is a 38 year old year old female who is one year s/p  Lupe en Y Gastric Bypass with Dr. Mcgraw.  In the interim, she has been feeling great. Her weight is down 78 lbs, a 27.8% total body weight reduction. On review today, her 9/1/23 labs show excellent vitamin support and resolution of her diabetes now off all medications, with A1c of 5.1%. HDL is rising well (up 20%) and LDL is excellent.    Yokasta Gary feels as if she has achieved the goals she hoped to accomplish through bariatric surgery and weight loss.    No diagnosis found.      Current Outpatient Medications:     calcium citrate (CITRACAL) 950 (200 Ca) MG tablet, Take 1 tablet by mouth 2 times daily, Disp: , Rfl:     Cyanocobalamin (B-12) 1000 MCG SUBL, Take 3 days weekly (Patient taking differently: Take 1,000 mcg by mouth daily Take 3 days weekly), Disp: 150 tablet, Rfl: 1    levonorgestrel (MIRENA, 52 MG,) 20 MCG/24HR IUD, 1 each by Intrauterine route once, Disp: , Rfl:     Pediatric Multivitamins-Iron (MULTIVITAMINS PLUS IRON CHILD) 18 MG CHEW, Take 1 chew tab by mouth 2 times daily Ok to substitute with any chewable that contains 18 mg of iron, Vitamin A, Thiamine and Zinc., Disp: 180 tablet, Rfl: 3    vitamin D3 (CHOLECALCIFEROL) 50 mcg (2000 units)  "tablet, Take 1 tablet by mouth daily, Disp: , Rfl:     Plan:  Great work with 78 lb reduction in total body weight and putting your diabetes into remission.    2. Vitamin regimen is excellent. Continue current regimen.    3. Continue staying active to destress, particularly new leadership role.     4. Follow up with dietician in 6 months and with me in 12 months with full labs.   Return in about 1 year (around 9/8/2024) for Follow up, with me.    Bariatric Surgery Review     Interim History/LifeChanges: she didn't make it to her 9 month dietician visit, has been much more active. Less anxious that she was at her 6 month visit. .   New promotaion led to her missing 9 month visit, new leadership role.  Patient Concerns: doing well.   Appetite (1-10): alternates between low hunger/high hunger if working out.  GERD: none..    Reviewed whether any need/indication for screening EGD today and we will deferred..  Typically, a screening EGD is recommend post op year 2-3 if no symptoms to assess health of esophagus/bariatric surgery and sooner if difficult to control GERD or persistent pain/dysphagia sx despite behavior modification.    Medication changes: none.    Vitamin Intake:   B-12   5000mcg, can use once weekly   [facility]  [place]   Vitamin D  5000 IU   Calcium   Citracal 2 daily. Has another 25mcg of D3 contributions to her total.     Other  None.              LABS: \"Reviewed    Nausea no  Vomiting no  Constipation no  Diarrhea rarely loose.  Rashes none.  Hair Loss coming back  Reactive Hypoglycemia rare. Sleepiness.   Light Headedness once. Due to dehydration.   Moods good, happy with progress.      Most recent labs:  Lab Results   Component Value Date    WBC 5.5 09/01/2023    HGB 14.8 09/01/2023    HCT 42.8 09/01/2023    MCV 83 09/01/2023     09/01/2023     Lab Results   Component Value Date    CHOL 139 09/01/2023     Lab Results   Component Value Date    HDL 49 (L) 09/01/2023     No components found for: " "LDLCALC  Lab Results   Component Value Date    TRIG 92 2023     No results found for: CHOLHDL  Lab Results   Component Value Date    ALT 20 2023    AST 23 2023    ALKPHOS 85 2023     No results found for: HGBA1C  Lab Results   Component Value Date    B12 1,047 2023     No components found for: VITDT1  Lab Results   Component Value Date    RADHA 82 2023     Lab Results   Component Value Date    PTHI 34 2023     Lab Results   Component Value Date    ZN 68.5 2023     Lab Results   Component Value Date    VIB1WB 190 (H) 2023     Lab Results   Component Value Date    TSH 1.23 2022     No results found for: TEST    Habits:  Tobacco/Nicotine/THC exposure? no   NSAID use? no   Alcohol use? Occasional.    Caffeine Habits? Yes, coffee w/ cream.        Exercise Routine: walking 3 miles daily, split in 2 sessions. Has [employer] plan for winter. Biking this summer again.  3 meals/day? yes  Protein 60-80g/day? yes  Water Separate from meals? yes  Calorie Containing Beverages: coffee.   Restaurant eating/wk: once or less  Sleep Habits:  6-8 hours  CPAP Use? never  Contraception: IUD, tubal ligation  DEXA:n/a.  Discussed annual screening to start at age 45 and continue to age 55 if scoring \"low risk\". DEXA scan recommended at age 55 regardless as long as at least 2 years have transpired from their bariatric surgery.    Social History     Social History     Socioeconomic History    Marital status:      Spouse name: Not on file    Number of children: Not on file    Years of education: Not on file    Highest education level: Not on file   Occupational History    Not on file   Tobacco Use    Smoking status: Former     Types: Cigarettes     Quit date: 2009     Years since quittin.0    Smokeless tobacco: Never   Substance and Sexual Activity    Alcohol use: Yes     Comment: one per month    Drug use: No    Sexual activity: Yes     Partners: Male     Birth " control/protection: I.U.D., Female Surgical   Other Topics Concern    Not on file   Social History Narrative    Not on file     Social Determinants of Health     Financial Resource Strain: Not on file   Food Insecurity: Not on file   Transportation Needs: Not on file   Physical Activity: Not on file   Stress: Not on file   Social Connections: Not on file   Intimate Partner Violence: Not on file   Housing Stability: Not on file       Past Medical History     Past Medical History:   Diagnosis Date    COVID-19     [place] 2021.    Dyspepsia     rare gaviscon use    Insulin controlled gestational diabetes mellitus (GDM) in second trimester 2018    Morbid (severe) obesity due to excess calories (H) 2022    Morbid obesity (H)     Type 2 diabetes mellitus without complication, without long-term current use of insulin (H) 2022    hx of gestational diabetes, A1c now 6.8% 2022 as she enters bariatric surgery program.     Past Surgical History:   Procedure Laterality Date    ANTERIOR CRUCIATE LIGAMENT REPAIR Left      SECTION N/A 10/17/2014    Procedure:  SECTION;  Surgeon: Maureen Chanel MD;  Location: [facility];  Service:      SECTION N/A 9/10/2018    Procedure:  SECTION, REPEAT;  Surgeon: Dorota Guzman MD;  Location: [facility];  Service:     LAPAROSCOPIC BYPASS GASTRIC N/A 2022    Procedure: CREATION, GASTRIC BYPASS, ERI - EN - Y LAPAROSCOPIC;  Surgeon: Alex Mcgraw MD;  Location: [facility]       Problem List     Patient Active Problem List   Diagnosis    Morbid obesity (H)    Type 2 diabetes mellitus without complication, without long-term current use of insulin (H)    Morbid (severe) obesity due to excess calories (H)     Medications     [unfilled]  Surgical History     Past Surgical History  She has a past surgical history that includes  Section (N/A, 10/17/2014); Anterior Cruciate Ligament Repair (Left);  " Section (N/A, 9/10/2018); and Laparoscopic bypass gastric (N/A, 2022).    Objective-Exam     Constitutional:  /68   Ht 1.575 m (5' 2\")   Wt 91.7 kg (202 lb 1.6 oz)   BMI 36.96 kg/m    [unfilled]   General:  Pleasant and in no acute distress   Eyes:  EOMI  ENT:  Airway patent    Neck:  Respiratory: Normal respiratory effort, no cough, .  CV:  RRR  Gastrointestinal: nontender  Musculoskeletal: muscle mass WNL  Skin: color without pallor,  hair thick/long,   Psychiatric: alert and oriented X3, mood and affect normal    Counseling     We reviewed the important post op bariatric recommendations:  -eating 3 meals daily  -eating protein first, getting >60gm protein daily  -eating slowly, chewing food well  -avoiding/limiting calorie containing beverages  -drinking water 15-30 minutes before or after meals  -limiting restaurant or cafeteria eating to twice a week or less    We discussed the importance of restorative sleep and stress management in maintaining a healthy weight.  We discussed the National Weight Control Registry healthy weight maintenance strategies and ways to optimize metabolism.  We discussed the importance of physical activity including cardiovascular and strength training in maintaining a healthier weight.    We discussed the importance of life-long vitamin supplementation and life-long  follow-up.    Yokasta was reminded that, to avoid marginal ulcers she should avoid tobacco at all, alcohol in excess, caffeine in excess, and NSAIDS (unless indicated for cardioprotection or othewise and opposed by a PPI).    North Bray MD    [facility].  2023  3:57 PM  [phone] (clinic phone)  [phone] (fax)    No images are attached to the encounter.  Medical Decision Making:          "

## 2023-09-08 ENCOUNTER — OFFICE VISIT (OUTPATIENT)
Dept: SURGERY | Facility: CLINIC | Age: 38
End: 2023-09-08
Payer: COMMERCIAL

## 2023-09-08 VITALS
SYSTOLIC BLOOD PRESSURE: 116 MMHG | BODY MASS INDEX: 37.19 KG/M2 | HEIGHT: 62 IN | DIASTOLIC BLOOD PRESSURE: 68 MMHG | WEIGHT: 202.1 LBS

## 2023-09-08 DIAGNOSIS — Z86.39 HX OF DIABETES MELLITUS: ICD-10-CM

## 2023-09-08 DIAGNOSIS — Z98.84 HX OF GASTRIC BYPASS: ICD-10-CM

## 2023-09-08 DIAGNOSIS — K91.2 POSTOPERATIVE MALABSORPTION: Primary | ICD-10-CM

## 2023-09-08 PROCEDURE — 99214 OFFICE O/P EST MOD 30 MIN: CPT | Performed by: EMERGENCY MEDICINE

## 2023-09-08 NOTE — LETTER
9/8/2023         RE: Yokasta Gary  [address] [place] [address]        Dear Colleague,    Thank you for referring your patient, Yokasta Gary, to the [facility] [place]. Please see a copy of my visit note below.    Bariatric Follow Up Visit with a History of Previous Bariatric Surgery     Date of visit: 9/7/2023  Physician: North Bray MD, MD  Primary Care Provider:  Dennise Ruff  Yokasta Gary   38 year old  female    Date of Surgery: 8/29/22  Initial Weight: 280 lbs  Initial BMI: 49.6 with co-morbid recent onset DM II prior to surgery (A1c 6.8%).   Today's Weight:   Wt Readings from Last 1 Encounters:   09/08/23 91.7 kg (202 lb 1.6 oz)     Weight history:   Wt Readings from Last 4 Encounters:   09/08/23 91.7 kg (202 lb 1.6 oz)   04/14/23 93.4 kg (206 lb)   08/29/22 120.6 kg (265 lb 14.4 oz)   08/10/22 125.6 kg (277 lb)      Body mass index is 36.96 kg/m .      Assessment and Plan     Assessment: Yokasta is a 38 year old year old female who is one year s/p  Lupe en Y Gastric Bypass with Dr. Mcgraw.  In the interim, she has been feeling great. Her weight is down 78 lbs, a 27.8% total body weight reduction. On review today, her 9/1/23 labs show excellent vitamin support and resolution of her diabetes now off all medications, with A1c of 5.1%. HDL is rising well (up 20%) and LDL is excellent.    Yokasta Gary feels as if she has achieved the goals she hoped to accomplish through bariatric surgery and weight loss.    No diagnosis found.      Current Outpatient Medications:      calcium citrate (CITRACAL) 950 (200 Ca) MG tablet, Take 1 tablet by mouth 2 times daily, Disp: , Rfl:      Cyanocobalamin (B-12) 1000 MCG SUBL, Take 3 days weekly (Patient taking differently: Take 1,000 mcg by mouth daily Take 3 days weekly), Disp: 150 tablet, Rfl: 1     levonorgestrel (MIRENA, 52 MG,) 20 MCG/24HR IUD, 1 each by Intrauterine route once, Disp: , Rfl:       "Pediatric Multivitamins-Iron (MULTIVITAMINS PLUS IRON CHILD) 18 MG CHEW, Take 1 chew tab by mouth 2 times daily Ok to substitute with any chewable that contains 18 mg of iron, Vitamin A, Thiamine and Zinc., Disp: 180 tablet, Rfl: 3     vitamin D3 (CHOLECALCIFEROL) 50 mcg (2000 units) tablet, Take 1 tablet by mouth daily, Disp: , Rfl:     Plan:  Great work with 78 lb reduction in total body weight and putting your diabetes into remission.    2. Vitamin regimen is excellent. Continue current regimen.    3. Continue staying active to destress, particularly new leadership role.     4. Follow up with dietician in 6 months and with me in 12 months with full labs.   Return in about 1 year (around 9/8/2024) for Follow up, with me.    Bariatric Surgery Review     Interim History/LifeChanges: she didn't make it to her 9 month dietician visit, has been much more active. Less anxious that she was at her 6 month visit. .   New promotaion led to her missing 9 month visit, new leadership role.  Patient Concerns: doing well.   Appetite (1-10): alternates between low hunger/high hunger if working out.  GERD: none..    Reviewed whether any need/indication for screening EGD today and we will deferred..  Typically, a screening EGD is recommend post op year 2-3 if no symptoms to assess health of esophagus/bariatric surgery and sooner if difficult to control GERD or persistent pain/dysphagia sx despite behavior modification.    Medication changes: none.    Vitamin Intake:   B-12   5000mcg, can use once weekly   [facility]  [place]   Vitamin D  5000 IU   Calcium   Citracal 2 daily. Has another 25mcg of D3 contributions to her total.     Other  None.              LABS: \"Reviewed    Nausea no  Vomiting no  Constipation no  Diarrhea rarely loose.  Rashes none.  Hair Loss coming back  Reactive Hypoglycemia rare. Sleepiness.   Light Headedness once. Due to dehydration.   Moods good, happy with progress.      Most recent labs:  Lab Results " "  Component Value Date    WBC 5.5 09/01/2023    HGB 14.8 09/01/2023    HCT 42.8 09/01/2023    MCV 83 09/01/2023     09/01/2023     Lab Results   Component Value Date    CHOL 139 09/01/2023     Lab Results   Component Value Date    HDL 49 (L) 09/01/2023     No components found for: LDLCALC  Lab Results   Component Value Date    TRIG 92 09/01/2023     No results found for: CHOLHDL  Lab Results   Component Value Date    ALT 20 09/01/2023    AST 23 09/01/2023    ALKPHOS 85 09/01/2023     No results found for: HGBA1C  Lab Results   Component Value Date    B12 1,047 09/01/2023     No components found for: VITDT1  Lab Results   Component Value Date    RADHA 82 09/01/2023     Lab Results   Component Value Date    PTHI 34 09/01/2023     Lab Results   Component Value Date    ZN 68.5 09/01/2023     Lab Results   Component Value Date    VIB1WB 190 (H) 09/01/2023     Lab Results   Component Value Date    TSH 1.23 02/04/2022     No results found for: TEST    Habits:  Tobacco/Nicotine/THC exposure? no   NSAID use? no   Alcohol use? Occasional.    Caffeine Habits? Yes, coffee w/ cream.        Exercise Routine: walking 3 miles daily, split in 2 sessions. Has [employer] plan for winter. Biking this summer again.  3 meals/day? yes  Protein 60-80g/day? yes  Water Separate from meals? yes  Calorie Containing Beverages: coffee.   Restaurant eating/wk: once or less  Sleep Habits:  6-8 hours  CPAP Use? never  Contraception: IUD, tubal ligation  DEXA:n/a.  Discussed annual screening to start at age 45 and continue to age 55 if scoring \"low risk\". DEXA scan recommended at age 55 regardless as long as at least 2 years have transpired from their bariatric surgery.    Social History     Social History     Socioeconomic History     Marital status:      Spouse name: Not on file     Number of children: Not on file     Years of education: Not on file     Highest education level: Not on file   Occupational History     Not on file   Tobacco " Use     Smoking status: Former     Types: Cigarettes     Quit date: 2009     Years since quittin.0     Smokeless tobacco: Never   Substance and Sexual Activity     Alcohol use: Yes     Comment: one per month     Drug use: No     Sexual activity: Yes     Partners: Male     Birth control/protection: I.U.D., Female Surgical   Other Topics Concern     Not on file   Social History Narrative     Not on file     Social Determinants of Health     Financial Resource Strain: Not on file   Food Insecurity: Not on file   Transportation Needs: Not on file   Physical Activity: Not on file   Stress: Not on file   Social Connections: Not on file   Intimate Partner Violence: Not on file   Housing Stability: Not on file       Past Medical History     Past Medical History:   Diagnosis Date     COVID-2021.     Dyspepsia     rare gaviscon use     Insulin controlled gestational diabetes mellitus (GDM) in second trimester 2018     Morbid (severe) obesity due to excess calories (H) 2022     Morbid obesity (H)      Type 2 diabetes mellitus without complication, without long-term current use of insulin (H) 2022    hx of gestational diabetes, A1c now 6.8% 2022 as she enters bariatric surgery program.     Past Surgical History:   Procedure Laterality Date     ANTERIOR CRUCIATE LIGAMENT REPAIR Left       SECTION N/A 10/17/2014    Procedure:  SECTION;  Surgeon: Maureen Chanel MD;  Location: [facility];  Service:       SECTION N/A 9/10/2018    Procedure:  SECTION, REPEAT;  Surgeon: Dorota Guzman MD;  Location: [facility];  Service:      LAPAROSCOPIC BYPASS GASTRIC N/A 2022    Procedure: CREATION, GASTRIC BYPASS, ERI - EN - Y LAPAROSCOPIC;  Surgeon: Alex Mcgraw MD;  Location: [facility]       Problem List     Patient Active Problem List   Diagnosis     Morbid obesity (H)     Type 2 diabetes mellitus without complication,  "without long-term current use of insulin (H)     Morbid (severe) obesity due to excess calories (H)     Medications     [unfilled]  Surgical History     Past Surgical History  She has a past surgical history that includes  Section (N/A, 10/17/2014); Anterior Cruciate Ligament Repair (Left);  Section (N/A, 9/10/2018); and Laparoscopic bypass gastric (N/A, 2022).    Objective-Exam     Constitutional:  /68   Ht 1.575 m (5' 2\")   Wt 91.7 kg (202 lb 1.6 oz)   BMI 36.96 kg/m    [unfilled]   General:  Pleasant and in no acute distress   Eyes:  EOMI  ENT:  Airway patent    Neck:  Respiratory: Normal respiratory effort, no cough, .  CV:  RRR  Gastrointestinal: nontender  Musculoskeletal: muscle mass WNL  Skin: color without pallor,  hair thick/long,   Psychiatric: alert and oriented X3, mood and affect normal    Counseling     We reviewed the important post op bariatric recommendations:  -eating 3 meals daily  -eating protein first, getting >60gm protein daily  -eating slowly, chewing food well  -avoiding/limiting calorie containing beverages  -drinking water 15-30 minutes before or after meals  -limiting restaurant or cafeteria eating to twice a week or less    We discussed the importance of restorative sleep and stress management in maintaining a healthy weight.  We discussed the National Weight Control Registry healthy weight maintenance strategies and ways to optimize metabolism.  We discussed the importance of physical activity including cardiovascular and strength training in maintaining a healthier weight.    We discussed the importance of life-long vitamin supplementation and life-long  follow-up.    Yokasta was reminded that, to avoid marginal ulcers she should avoid tobacco at all, alcohol in excess, caffeine in excess, and NSAIDS (unless indicated for cardioprotection or othewise and opposed by a PPI).    North Bray MD    [facility].  2023  3:57 " PM  [phone] (clinic phone)  [phone] (fax)    No images are attached to the encounter.  Medical Decision Making:              Again, thank you for allowing me to participate in the care of your patient.        Sincerely,        North Bray MD

## 2023-09-08 NOTE — PATIENT INSTRUCTIONS
Plan:  Great work with 78 lb reduction in total body weight and putting your diabetes into remission.    2. Vitamin regimen is excellent. Continue current regimen.    3. Continue staying active to destress, particularly new leadership role.     4. Follow up with dietician in 6 months and with me in 12 months with full labs.       [facility]  Nutritional Guidelines  Gastric Bypass 12 Months Post Op    General Guidelines and Helpful Hints:  Eat 3 meals per day + protein supplement(s). No snacks between meals.  Do not skip meals.  This can cause overeating at the next meal and will prevent adequate protein and nutritional intake.  Aim for 60-80 grams of protein per day.  Always eat your protein first. This assists with optimal nutrition and helps you stay full longer.  Depending on your portion size, you may need to drink approved protein supplement between meals to achieve protein goals. Follow recommendations of your Dietitian.   Eat your protein first, and then follow with fiber.   It is not necessary to count your fiber, but 15-20 grams per day is recommended.    Add fiber by including fruits, vegetables, whole grains, and beans.   Portions should be about 1 cup per meal. Use measuring cups to be accurate.  Continue to use saucer/salad plates, infant/toddler silverware to keep portion sizes small and take small bites.  Eat S-L-O-W-L-Y to make each meal last 20-30 minutes. Always stop eating when satisfied.  Continue to use caution with foods containing skins, peels or membranes. Chew well!  Aim for 64 oz. of calorie-free fluids daily.  Continue to avoid caffeine and carbonation. If you choose to drink alcohol, do so in moderation.   Remember to avoid drinking during meals, 15-30 minutes before and 30 minutes after.  Exercise is rivera for continued weight loss and weight maintenance. Aim for 30-60 minutes of physical activity most days of the week. Include cardiovascular and strength training.  If  having trouble tolerating meat, try using a crock-pot, tinfoil tent, steamer or other moist cooking method to create tender meats. Add broth or low-fat gravy to help meat stay moist.   Avoid high sugar and high fat foods to prevent dumping syndrome.  Check nutrition labels for less than 10 grams of sugar and less than 10 grams of fat per serving.  Continue Taking Vitamins/Minerals:  6741-0467 mcg of Sublingual B-12 daily  1 Multivitamin with Iron twice daily (chewable or swallow tabs)  500-600 mg Calcium Citrate twice daily (chewable or swallow tabs)  5000 IU Vitamin D3 daily    Sample Grocery List    Protein:  Fat free Greek or light yogurt (less than 10 grams sugar)  Fat free or low-fat cottage cheese  String cheese or reduced fat cheese slices  Tuna, salmon, crab, egg, or chicken salad made with light or fat free mayonnaise  Egg or Egg Substitute  Lean/extra lean turkey, beef, bison, venison (ground, sirloin, round, flank)  Pork loin or tenderloin (grilled, baked, broiled)  Fish such as salmon, tuna, trout, tilapia, etc. (grilled, baked, broiled)  Tender cuts of lean (skinless) turkey or chicken  Lean deli meats: turkey, lean ham, chicken, lean roast beef  Beans such as kidney, garbanzo, black, padilla, or low-fat/fat free refried beans  Peanut butter (natural preferred). Limit to 1 Tbsp. per day.  Low-fat meatloaf (made with lean ground beef or turkey)  Sloppy Joes made with low-sugar ketchup and lean ground beef or turkey  Soy or vegetable protein (i.e. vegan crumbles, soy/veggie burger, tofu)  Hummus    Vegetables:  Fresh: cooked or raw (as tolerated)  Frozen vegetables  Canned vegetables (low sodium or no salt added, rinse before cooking/eating)  (Ok to have skins/peels/membranes/seeds - just chew well)    Fruits:  Fresh fruit  Frozen fruit (no sugar added)  Canned fruit (packed in its own juice, NOT syrup)  (Ok to have skins/peels/membranes/seeds - just chew well)    Starch:  Unsweetened whole-grain hot cereal  (or high fiber cold cereal, dry)  Toasted whole wheat bread or [place] Thins  Whole grain crackers  Baked/boiled/mashed potato/sweet potato  Cooked whole grain pasta, brown rice, or other cooked whole grains  Starchy vegetables: corn, peas, winter squash      Protein Supplement:   Ready to drink protein shake with:  15-30 grams protein per serving  Less than 10 grams total carbohydrate per serving   Protein powder mixed with:   Skim or 1% milk  Low fat or fat free Lactaid milk, plain or no sugar added soymilk  Water     Fats: (use in moderation)  1 teaspoon of soft tub margarine  1 teaspoon olive oil, canola oil, or peanut oil  1 tablespoon of low-fat tobin or salad dressing     Sample Menu for 12 months after Gastric Bypass    You do NOT need to eat/drink the full portion sizes listed below  Always stop when you are satisfied    Breakfast   cup 1% cottage cheese     cup diced peaches   Lunch   slice whole grain bread/toast with 1 tsp. light tobin  2 oz. sliced lean turkey, ham, or chicken    cup carrots   Supplement Approved protein supplement (as needed between meals)   Dinner   cup 93% lean ground beef mixed with 2 Tbsp. marinara sauce     cup green beans    cup whole grain pasta     Breakfast   cup egg substitute or 2 egg whites, scrambled   1 oz low fat shredded cheese    cup sautéed chopped vegetables mixed in   Lunch 1 cup chili made with lean ground beef or turkey   Supplement 6 oz light Greek yogurt (as needed)   Dinner 3 oz  grilled, broiled, or baked lemon pepper salmon  2 Tbsp. grilled asparagus  2 Tbsp. baked sweet potato     Breakfast   cup whole grain oatmeal made with skim milk and unflavored protein powder added    cup blueberries       Lunch 3 oz  meatloaf made with lean ground turkey    cup steamed broccoli   Dinner 3 oz pork loin made in a crock pot seasoned with a spice rub    cup cooked carrots   Supplement Approved protein supplement (as needed between meals)     Breakfast   cup egg scramble  made with egg substitute and turkey sausage    whole grain English muffin with 1 teaspoon low sugar jelly   Lunch 3 oz seasoned, skinless grilled chicken     cup grilled vegetables   Dinner 2 oz lean beef    cup brown rice    cup strawberries   Supplement 1 string cheese (as needed)     Breakfast 6 ounces light Greek yogurt    cup unsweetened mixed berries   Lunch 3 oz shrimp, with low-sugar cocktail sauce for dipping    cup pea pods   Supplement   cup fat free cottage cheese (as needed)   Dinner 3 oz tender turkey breast (made in crock pot for extra moisture)    of a small whole wheat dinner roll     Breakfast     cup low fat cottage cheese    cup strawberries   Lunch   cup black bean soup  4 whole grain crackers   Supplement 1 cup skim milk with scoop of protein powder added (as needed)   Dinner 3 oz. grilled tilapia with lemon pepper seasoning    cup grilled bell peppers     Breakfast 2 ounces turkey sausage veronica    whole wheat English muffin   Supplement Approved protein supplement (as needed between meals)   Lunch 3 oz lean ham, turkey, or chicken     cup tomatoes   Dinner 2 oz. sirloin steak    cup mixed vegetables    cup brown rice      LEAN PROTEIN SOURCES  Getting 20-30 grams of protein, 3 meals daily, is appropriate for most people, some need more but more than about 40 grams per meal is not useful.  General rule is drinking one ounce of water per gram of protein eaten over the course of the day:  70 grams of protein each day, drink 70 oz of water.  Protein Source Portion Calories Grams of Protein                           Nonfat, plain Greek yogurt    (10 grams sugar or less) 3/4 cup (6 oz)  12-17   Light Yogurt (10 grams sugar or less) 3/4 cup (6 oz)  6-8   Protein Shake 1 shake 110-180 15-30   Skim/1% Milk or lactose-free milk 1 cup ( 8 oz)  8   Plain or light, flavored soymilk 1 cup  7-8   Plain or light, hemp milk 1 cup 110 6   Fat Free or 1% Cottage Cheese 1/2 cup 90 15    Part skim ricotta cheese 1/2 cup 100 14   Part skim or reduced fat cheese slices 1 ounce 65-80 8     Mozzarella String Cheese 1 80 8   Canned tuna, chicken, crab or salmon  (canned in water)  1/2 cup 100 15-20   White fish (broiled, grilled, baked) 3 ounces 100 21   [place]/Tuna (broiled, grilled, baked) 3 ounces 150-180 21   Shrimp, Scallops, Lobster, Crab 3 ounces 100 21   Pork loin, Pork Tenderloin 3 ounces 150 21   Boneless, skinless chicken /turkey breast                          (broiled, grilled, baked) 3 ounces 120 21   [place], Curry, [place], and Venison 3 ounces 120 21   Lean cuts of red meat and pork (sirloin,   round, tenderloin, flank, ground 93%-96%) 3 ounces 170 21   Lean or Extra Lean Ground Turkey 1/2 cup 150 20   90-95% Lean [place] Burger 1 veronica 140-180 21   Low-fat casserole with lean meat 3/4 cup 200 17   Luncheon Meats                                                        (turkey, lean ham, roast beef, chicken) 3 ounces 100 21   Egg (boiled, poached, scrambled) 1 Egg 60 7   Egg Substitute 1/2 cup 70 10   Nuts (limit to 1 serving per day)  3 Tbsp. 150 7   Nut Enigma (peanut, almond)  Limit to 1 serving or less daily 1 Tbsp. 90 4   Soy Burger (varies) 1  15   Garbanzo, Black, [place] Beans 1/2 cup 110 7   Refried Beans 1/2 cup 100 7   Kidney and Lima beans 1/2 cup 110 7   Tempeh 3 oz 175 18   Vegan crumbles 1/2 cup 100 14   Tofu 1/2 cup 110 14   Chili (beans and extra lean beef or turkey) 1 cup 200 23   Lentil Stew/Soup 1 cup 150 12   Black Bean Soup 1 cup 175 12

## 2023-11-16 ENCOUNTER — IMMUNIZATION (OUTPATIENT)
Dept: FAMILY MEDICINE | Facility: CLINIC | Age: 38
End: 2023-11-16
Payer: COMMERCIAL

## 2023-11-16 DIAGNOSIS — E66.812 OBESITY, CLASS II, BMI 35-39.9: Primary | ICD-10-CM

## 2023-11-16 PROCEDURE — 90471 IMMUNIZATION ADMIN: CPT

## 2023-11-16 PROCEDURE — 90686 IIV4 VACC NO PRSV 0.5 ML IM: CPT

## 2023-11-16 RX ORDER — PHENTERMINE HYDROCHLORIDE 37.5 MG/1
TABLET ORAL
Qty: 45 TABLET | Refills: 1 | Status: SHIPPED | OUTPATIENT
Start: 2023-11-16 | End: 2024-02-07 | Stop reason: SINTOL

## 2023-12-17 ENCOUNTER — MYC MEDICAL ADVICE (OUTPATIENT)
Dept: FAMILY MEDICINE | Facility: CLINIC | Age: 38
End: 2023-12-17
Payer: COMMERCIAL

## 2023-12-17 DIAGNOSIS — U07.1 INFECTION DUE TO 2019 NOVEL CORONAVIRUS: Primary | ICD-10-CM

## 2023-12-18 NOTE — TELEPHONE ENCOUNTER
RN COVID TREATMENT VISIT  12/18/23      The patient has been triaged and does not require a higher level of care.    Yokasta Gary  38 year old  Current weight? 195    Has the patient been seen by a primary care provider at an [facility] or [facility] within the past two years? Yes.   Have you been in close proximity to/do you have a known exposure to a person with a confirmed case of influenza? No.     General treatment eligibility:  Date of positive COVID test (PCR or at home)?  12/17/23    Are you or have you been hospitalized for this COVID-19 infection? No.   Have you received monoclonal antibodies or antiviral treatment for COVID-19 since this positive test? No.   Do you have any of the following conditions that place you at risk of being very sick from COVID-19?   - Diabetes mellitus, type 1 and type 2  - Overweight or Obesity (BMI >85th percentile or BMI 25 or higher)  Yes, patient has at least one high risk condition as noted above.     Current COVID symptoms:   - fever or chills  - cough  - congestion or runny nose  Yes. Patient has at least one symptom as selected.     How many days since symptoms started? 5 days or less. Established patient, 12 years or older weighing at least 88.2 lbs, who has symptoms that started in the past 5 days, has not been hospitalized nor received treatment already, and is at risk for being very sick from COVID-19.     Treatment eligibility by RN:  Are you currently pregnant or nursing? No  Do you have a clinically significant hypersensitivity to nirmatrelvir or ritonavir, or toxic epidermal necrolysis (TEN) or Mccarty-Toro Syndrome? No  Do you have a history of hepatitis, any hepatic impairment on the Problem List (such as Child-Campoverde Class C, cirrhosis, fatty liver disease, alcoholic liver disease), or was the last liver lab (hepatic panel, ALT, AST, ALK Phos, bilirubin) elevated in the past 6 months? No  Do you have any history of severe  renal impairment (eGFR < 30mL/min)? No    Is patient eligible to continue? Yes, patient meets all eligibility requirements for the RN COVID treatment (as denoted by all no responses above).     Current Outpatient Medications   Medication Sig Dispense Refill    calcium citrate (CITRACAL) 950 (200 Ca) MG tablet Take 1 tablet by mouth 2 times daily      Cyanocobalamin (B-12) 1000 MCG SUBL Take 3 days weekly (Patient taking differently: Take 1,000 mcg by mouth daily Take 3 days weekly) 150 tablet 1    levonorgestrel (MIRENA, 52 MG,) 20 MCG/24HR IUD 1 each by Intrauterine route once      Pediatric Multivitamins-Iron (MULTIVITAMINS PLUS IRON CHILD) 18 MG CHEW Take 1 chew tab by mouth 2 times daily Ok to substitute with any chewable that contains 18 mg of iron, Vitamin A, Thiamine and Zinc. 180 tablet 3    phentermine (ADIPEX-P) 37.5 MG tablet Start half tablet daily after breakfast. 45 tablet 1    vitamin D3 (CHOLECALCIFEROL) 50 mcg (2000 units) tablet Take 1 tablet by mouth daily         Medications from List 1 of the standing order (on medications that exclude the use of Paxlovid) that patient is taking: NONE. Is patient taking Nik's Wort? No  Is patient taking Kensett's Wort or any meds from List 1? No.   Medications from List 2 of the standing order (on meds that provider needs to adjust) that patient is taking: NONE. Is patient on any of the meds from List 2? No.   Medications from List 3 of standing order (on meds that a RN needs to adjust) that patient is taking: NONE. Is patient on any meds from List 3? No.     Paxlovid has an approximate 90% reduction in hospitalization. Paxlovid can possibly cause altered sense of taste, diarrhea (loose, watery stools), high blood pressure, muscle aches.     Would patient like a Paxlovid prescription?   Yes.   Lab Results   Component Value Date    GFRESTIMATED >90 09/01/2023       Was last eGFR reduced? No, eGFR 60 or greater/ No Result on record. Patient can receive the  normal renal function dose. Paxlovid Rx sent to [place] pharmacy   [facility]    Temporary change to home medications: None          Reviewed the following instructions with the patient:    Paxlovid (nimatrelvir and ritonavir)    How it works  Two medicines (nirmatrelvir and ritonavir) are taken together. They stop the virus from growing. Less amount of virus is easier for your body to fight.    How to take  Medicine comes in a daily container with both medicine tablets. Take by mouth twice daily (once in the morning, once at night) for 5 days.  The number of tablets to take varies by patient.  Don't chew or break capsules. Swallow whole.    When to take  Take as soon as possible after positive COVID-19 test result, and within 5 days of your first symptoms.    Possible side effects  Can cause altered sense of taste, diarrhea (loose, watery stools), high blood pressure, muscle aches.    Corinne Ravenwald, RN

## 2023-12-23 ENCOUNTER — HEALTH MAINTENANCE LETTER (OUTPATIENT)
Age: 38
End: 2023-12-23

## 2024-01-11 ENCOUNTER — E-VISIT (OUTPATIENT)
Dept: URGENT CARE | Facility: CLINIC | Age: 39
End: 2024-01-11
Payer: COMMERCIAL

## 2024-01-11 DIAGNOSIS — R30.0 DYSURIA: Primary | ICD-10-CM

## 2024-01-11 PROCEDURE — 99207 PR NON-BILLABLE SERV PER CHARTING: CPT | Performed by: NURSE PRACTITIONER

## 2024-01-12 DIAGNOSIS — N30.00 ACUTE CYSTITIS WITHOUT HEMATURIA: Primary | ICD-10-CM

## 2024-01-12 RX ORDER — NITROFURANTOIN 25; 75 MG/1; MG/1
100 CAPSULE ORAL 2 TIMES DAILY
Qty: 10 CAPSULE | Refills: 0 | Status: SHIPPED | OUTPATIENT
Start: 2024-01-12 | End: 2024-01-17

## 2024-01-12 NOTE — PATIENT INSTRUCTIONS
Dear Yokasta Gary,    We are sorry you are not feeling well. Based on the responses you provided, you may be experiencing a serious health condition that needs immediate in-person attention. It is recommended that you be seen in the emergency room in order to better evaluate your symptoms. Please click here to find the nearest Emergency Room.     Staci Lackey NP

## 2024-02-07 DIAGNOSIS — E11.9 TYPE II DIABETES MELLITUS, WELL CONTROLLED (H): ICD-10-CM

## 2024-02-07 DIAGNOSIS — Z86.39 HX OF DIABETES MELLITUS: Primary | ICD-10-CM

## 2024-02-08 NOTE — PROGRESS NOTES
Patient has had excellent response with initial weight loss in her diabetes management but some increased appetite/worsening trajectory of late. For dual benefit on glycemic control, we'll plan to ramp to low dose Mounjaro if affordable/tolerated, 5mg/week. Recheck metabolic studies. Lipids excellent control:  Recent Labs   Lab Test 09/01/23  1009 04/10/23  1004   CHOL 139 136   HDL 49* 40*   LDL 72 73   TRIG 92 115     Lab Results   Component Value Date    A1C 5.1 09/01/2023    A1C 5.0 04/10/2023    A1C 6.9 08/10/2022    A1C 6.8 02/04/2022       North Bray MD

## 2024-03-08 ENCOUNTER — VIRTUAL VISIT (OUTPATIENT)
Dept: SURGERY | Facility: CLINIC | Age: 39
End: 2024-03-08
Payer: COMMERCIAL

## 2024-03-08 DIAGNOSIS — Z98.84 HX OF GASTRIC BYPASS: ICD-10-CM

## 2024-03-08 DIAGNOSIS — Z86.39 HX OF DIABETES MELLITUS: ICD-10-CM

## 2024-03-08 DIAGNOSIS — E66.9 OBESITY (BMI 30-39.9): Primary | ICD-10-CM

## 2024-03-08 DIAGNOSIS — Z98.84 S/P BARIATRIC SURGERY: ICD-10-CM

## 2024-03-08 PROCEDURE — 97803 MED NUTRITION INDIV SUBSEQ: CPT | Mod: 95

## 2024-03-08 NOTE — PROGRESS NOTES
"Yokasta Gary is a 38 year old who is being evaluated via a billable video visit.    How would you like to obtain your AVS? MyChart  If the video visit is dropped, the invitation should be resent by: Send to e-mail at: [email or web address]  Will anyone else be joining your video visit? No  {If patient encounters technical issues they should call [phone]      Post-op Surgical Weight Loss Diet Evaluation     Assessment:  Pt presents for  18 month  post-op RD visit, s/p RNYGB on 8/29/2022 with Dr. Mcgraw. Today we reviewed current eating habits and level of physical activity, and instructed on the changes that are required for successful bariatric outcomes.    Patient Progress: Patient reports she did notice some weight plateau and increased cravings and old habits. Is not a breakfast person. After starting medication food chatter has decreased again.     Weight loss medication: Mounjaro    Initial weight: 277 lbs  Current weight: 203 lbs  Weight change: 74 lbs (down), 26.7%     BMI: 36.96    Patient Active Problem List   Diagnosis    Morbid obesity (H)    Type 2 diabetes mellitus without complication, without long-term current use of insulin (H)    Morbid (severe) obesity due to excess calories (H)       Diabetes: yes, type 2  Hgb A1c: 5.1% (9/01/2023)    Vitamins   Multi Vit with Iron: yes  Calcium Citrate: yes  B12: yes  D3: yes    Do you experience hunger? yes  Do you have \"dumping\" syndrome? none  Do you experience any reflux or discomfort with eating? No    Diet Recall/Time:   Breakfast: skips or protein shake ([place])  Lunch: salad kit with chicken OR soup   Dinner: protein (chicken or pork chops, steak), veggies (cooked), brown rice or 1/2 potatoes (baked or sweet)    Typical Snacks: apple or berries, Ratio yogurt with fruit     Proteins/Veg/Fruits/CHO (NOT well tolerated): Eggs     Estimated protein intake: 60-80 grams    Estimated portion size per meals:1 - 1/2 cup/meal      Meal Duration: " "15-20 minutes/meal    Fluid-meal separation:  Fluids are  30min before and 30 minutes after meals.    Fluid Intake  Water: at least 96 oz/day (flavoring here and there)   Caffeine: 3-4 cups/day with SF creamer       Exercise: Walking, Weight Lifting, [place], HIIT Training-shoots for daily for 30-45 minutes      PES statement:      (NC-1.4) Altered GI Function related to Alteration in gastrointestinal tract structure and/or function/ Decreased functional length of the GI tract as evidenced by Weight loss of 26.7% initial body weight; Gastric bypass surgery    Intervention    Discussion  Discussed 18 Months Post-Op Nutritional Guidelines for RNY GB  Recommended to consume 15-20gm protein at 3 meals daily, along with protein supplement/\"planned protein containing snack\" of 15-30gm protein, to reach goal of 60-80 gm protein daily.  Educated on post-op vitamin regimen: Multi Vit + iron 2x/day, calcium citrate 400-600 mg 2x/day, 5297-8463 mcg of Sublingual B-12 daily, and 5000 IU Vitamin D3 daily (MVI and calcium can be taken at the same time BID)  Reviewed lean protein sources  Bariatric Plate Method-  including lean/low fat protein at each meal, including a vegetable/fruit, and limiting carbohydrate intake to less than 25% of plate volume.     Instructions  Include 15-20gm protein at each meal, along with protein supplement/\"planned protein containing snack\" of 15-30gm protein, to reach goal of 60-80 gm protein daily.  Increase fluid intake to 64oz daily: choose plain or calorie/carbonation-free beverages.  Incorporate daily structured activity, 30-60 minutes most days of the week  Increase vegetable/fruit intake, by having a vegetable or fruit with each meal daily.  Practice plate method: 1/2 plate lean/low fat protein source, vegetable/fruit, <25% of plate complex carbohydrates.  Separate fluids 30 minutes before/after meal times.  Practice eating off of smaller plates/bowls, chewing to applesauce " consistency, taking 20-30 minutes to eat in a calm/relaxed environment without distractions of tv/email/cell phone.    Handouts provided:  18 month  Post-Op Nutritional Guidelines for RNY GB    Assessment/Plan:    Pt to follow up for  2 year  post-op visit with bariatrician       Video-Visit Details    Type of service:  Video Visit    Video Start Time (time video started):  3:46 pm    Video End Time (time video stopped): 4:01 PM    Originating Location (pt. Location): Home        Distant Location (provider location):  Off-site    Mode of Communication:  Video Conference via [facility]    Physician has received verbal consent for a Video Visit from the patient? Yes    Naty Trammell RD

## 2024-03-08 NOTE — LETTER
"    3/8/2024         RE: Yokasta Gary  [address] [place] [address]        Dear Colleague,    Thank you for referring your patient, Yokasta Gary, to the [facility] SURGERY CLINIC AND BARIATRICS CARE [place]. Please see a copy of my visit note below.    Yokasta Gary is a 38 year old who is being evaluated via a billable video visit.    How would you like to obtain your AVS? MyChart  If the video visit is dropped, the invitation should be resent by: Send to e-mail at: [email or web address]  Will anyone else be joining your video visit? No  {If patient encounters technical issues they should call [phone]      Post-op Surgical Weight Loss Diet Evaluation     Assessment:  Pt presents for  18 month  post-op RD visit, s/p RNYGB on 8/29/2022 with Dr. Mcgraw. Today we reviewed current eating habits and level of physical activity, and instructed on the changes that are required for successful bariatric outcomes.    Patient Progress: Patient reports she did notice some weight plateau and increased cravings and old habits. Is not a breakfast person. After starting medication food chatter has decreased again.     Weight loss medication: Mounjaro    Initial weight: 277 lbs  Current weight: 203 lbs  Weight change: 74 lbs (down), 26.7%     BMI: 36.96    Patient Active Problem List   Diagnosis     Morbid obesity (H)     Type 2 diabetes mellitus without complication, without long-term current use of insulin (H)     Morbid (severe) obesity due to excess calories (H)       Diabetes: yes, type 2  Hgb A1c: 5.1% (9/01/2023)    Vitamins   Multi Vit with Iron: yes  Calcium Citrate: yes  B12: yes  D3: yes    Do you experience hunger? yes  Do you have \"dumping\" syndrome? none  Do you experience any reflux or discomfort with eating? No    Diet Recall/Time:   Breakfast: skips or protein shake ([place]ife)  Lunch: salad kit with chicken OR soup   Dinner: protein (chicken or pork chops, steak), veggies " "(cooked), brown rice or 1/2 potatoes (baked or sweet)    Typical Snacks: apple or berries, Ratio yogurt with fruit     Proteins/Veg/Fruits/CHO (NOT well tolerated): Eggs     Estimated protein intake: 60-80 grams    Estimated portion size per meals:1 - 1/2 cup/meal      Meal Duration: 15-20 minutes/meal    Fluid-meal separation:  Fluids are  30min before and 30 minutes after meals.    Fluid Intake  Water: at least 96 oz/day (flavoring here and there)   Caffeine: 3-4 cups/day with SF creamer       Exercise: Walking, Weight Lifting, [place], HIIT Training-shoots for daily for 30-45 minutes      PES statement:      (NC-1.4) Altered GI Function related to Alteration in gastrointestinal tract structure and/or function/ Decreased functional length of the GI tract as evidenced by Weight loss of 26.7% initial body weight; Gastric bypass surgery    Intervention    Discussion  Discussed 18 Months Post-Op Nutritional Guidelines for RNY GB  Recommended to consume 15-20gm protein at 3 meals daily, along with protein supplement/\"planned protein containing snack\" of 15-30gm protein, to reach goal of 60-80 gm protein daily.  Educated on post-op vitamin regimen: Multi Vit + iron 2x/day, calcium citrate 400-600 mg 2x/day, 6599-8967 mcg of Sublingual B-12 daily, and 5000 IU Vitamin D3 daily (MVI and calcium can be taken at the same time BID)  Reviewed lean protein sources  Bariatric Plate Method-  including lean/low fat protein at each meal, including a vegetable/fruit, and limiting carbohydrate intake to less than 25% of plate volume.     Instructions  Include 15-20gm protein at each meal, along with protein supplement/\"planned protein containing snack\" of 15-30gm protein, to reach goal of 60-80 gm protein daily.  Increase fluid intake to 64oz daily: choose plain or calorie/carbonation-free beverages.  Incorporate daily structured activity, 30-60 minutes most days of the week  Increase vegetable/fruit intake, by having a " vegetable or fruit with each meal daily.  Practice plate method: 1/2 plate lean/low fat protein source, vegetable/fruit, <25% of plate complex carbohydrates.  Separate fluids 30 minutes before/after meal times.  Practice eating off of smaller plates/bowls, chewing to applesauce consistency, taking 20-30 minutes to eat in a calm/relaxed environment without distractions of tv/email/cell phone.    Handouts provided:  18 month  Post-Op Nutritional Guidelines for RNY GB    Assessment/Plan:    Pt to follow up for  2 year  post-op visit with bariatrician       Video-Visit Details    Type of service:  Video Visit    Video Start Time (time video started):  3:46 pm    Video End Time (time video stopped): 4:01 PM    Originating Location (pt. Location): Home        Distant Location (provider location):  Off-site    Mode of Communication:  Video Conference via [facility]    Physician has received verbal consent for a Video Visit from the patient? Yes    Naty Trammell RD              Again, thank you for allowing me to participate in the care of your patient.        Sincerely,        Naty Trammell RD

## 2024-03-08 NOTE — PATIENT INSTRUCTIONS
[facility]ariatric [facility]  Nutritional Guidelines  Gastric Bypass 18 Months Post Op and Beyond    General Guidelines and Helpful Hints:  Eat 3 meals per day + protein supplement(s). No snacks between meals.  Do not skip meals.  This can cause overeating at the next meal and will prevent adequate protein and nutritional intake.  Aim for 60-80 grams of protein per day.  Always eat your protein first. This assists with optimal nutrition and helps you stay full longer.  Depending on your portion size, you may need to drink approved protein supplement between meals to achieve protein goals. Follow recommendations of your Dietitian.   Eat your protein first, and then follow with fiber.   It is not necessary to count your fiber, but 15-20 grams per day is recommended.    Add fiber by including fruits, vegetables, whole grains, and beans.   Portions should remain about 1 cup per meal. Use measuring cups to be accurate.  Continue to use saucer/salad plates, infant/toddler silverware to keep portion sizes small and take small bites.  Eat S-L-O-W-L-Y to make each meal last 20-30 minutes. Always stop eating when satisfied.  Continue to use caution with foods containing skins, peels or membranes. Chew well!  Aim for 64 oz. of calorie-free fluids daily.  Continue to avoid caffeine and carbonation. If you choose to drink alcohol, do so in moderation.   Remember to avoid drinking during meals, 15-30 minutes before and 30 minutes after.  Exercise is rivera for continued weight loss and weight maintenance. Aim for 30-60 minutes of physical activity most days of the week. Include cardiovascular and strength training.  If having trouble tolerating meat, try using a crock-pot, tinfoil tent, steamer or other moist cooking method to create tender meats. Add broth or low-fat gravy to help meat stay moist.   Avoid high sugar and high fat foods to prevent dumping syndrome.  Check nutrition labels for less than 10 grams of sugar and less  than 10 grams of fat per serving.  Continue Taking Vitamins/Minerals:  8201-0580 mcg of Sublingual B-12 daily  1 Multivitamin with Iron twice daily (chewable or swallow tabs)  500-600 mg Calcium Citrate twice daily (chewable or swallow tabs)  5000 IU Vitamin D3 daily    Grocery List  Protein:  Fat free Greek or light yogurt (less than 10 grams sugar)  Fat free or low-fat cottage cheese  String cheese or reduced fat cheese slices  Tuna, salmon, crab, egg, or chicken salad made with light or fat free mayonnaise  Egg or Egg Substitute  Lean/extra lean turkey, beef, bison, venison (ground, sirloin, round, flank)  Pork loin or tenderloin (grilled, baked, broiled)  Fish such as salmon, tuna, trout, tilapia, etc. (grilled, baked, broiled)  Tender cuts of lean (skinless) turkey or chicken  Lean deli meats: turkey, lean ham, chicken, lean roast beef  Beans such as kidney, garbanzo, black, padilla, or low-fat/fat free refried beans  Peanut butter (natural preferred). Limit to 1 Tbsp. per day.  Low-fat meatloaf (made with lean ground beef or turkey)  Sloppy Joes made with low-sugar ketchup and lean ground beef or turkey  Soy or vegetable protein (i.e. vegan crumbles, soy/veggie burger, tofu)  Hummus    Vegetables:  Fresh: cooked or raw (as tolerated)  Frozen vegetables  Canned vegetables (low sodium or no salt added, rinse before cooking/eating)  (Ok to have skins/peels/membranes/seeds - just chew well)    Fruits:  Fresh fruit  Frozen fruit (no sugar added)  Canned fruit (packed in its own juice, NOT syrup)  (Ok to have skins/peels/membranes/seeds - just chew well)    Starch:  Unsweetened whole-grain hot cereal (or high fiber cold cereal, dry)  Toasted whole wheat bread or [place] Thins  Whole grain crackers  Baked /boiled/mashed potato/sweet potato  Cooked whole grain pasta, brown rice, or other cooked whole grains  Starchy vegetables: corn, peas, winter squash        Protein Supplement:   Ready to drink protein shake  with:  15-30 grams protein per serving  Less than 10 grams total carbohydrate per serving   Protein powder mixed with:   Skim or 1% milk  Low fat or fat free Lactaid milk, plain or no sugar added soymilk  Water     Fats: (use in moderation)  1 teaspoon of soft tub margarine  1 teaspoon olive oil, canola oil, or peanut oil  1 tablespoon of low-fat tobin or salad dressing     Sample Menu for 18+ months after Gastric Bypass    You do NOT need to eat/drink the full portion sizes listed below  Always stop when you are satisfied    Breakfast   cup 1% cottage cheese     cup mixed berries   Lunch 2 oz lean roast beef on   [place] Thin with 1 tsp. light tobin    small tomato, chopped, mixed with 1 tsp. light vinaigrette dressing   Supplement Approved protein supplement (if needed between meals)   Dinner 2 oz grilled salmon    cup salad greens with 1 tsp. light salad dressing and 1 tsp. ground flax seed    cup quinoa or brown rice     Breakfast   cup egg substitute with   cup sautéed chopped vegetables  2 light [place] Krisp crackers   Lunch Tuna Melt:   cup tuna mixed with 1 tsp. light tobin over   [place] Thin. Top with 2-3 slices cucumber and 1 oz slice of low fat cheese   Supplement 1 cup skim milk (if needed between meals)   Dinner 3 oz  grilled, broiled, or baked seasoned skinless chicken breast    cup asparagus     Breakfast   cup plain oatmeal made with skim or 1% milk with 1 Tbsp. flavored/unflavored protein powder added  1 mozzarella string cheese   Lunch 2 oz deli turkey breast  1/3 cup salad with 1 tsp. light salad dressing, 1/8 of a whole avocado and 1 Tbsp. sunflower seeds   Dinner 3 oz. pork loin made in a crock pot, seasoned with a spice rub    cup cooked carrots   Supplement Approved protein supplement (if needed between meals)     Breakfast 1 cup breakfast casserole made with egg substitute, turkey sausage,  and steamed, chopped bell peppers   Supplement  1 cup light Greek yogurt (if needed between meals)   Lunch  2 oz. teriyaki turkey    cup mashed sweet potato with 1-2 spritzes of spray butter (like [place])    cup fresh pineapple   Dinner 3 oz low fat meatloaf    cup roasted garlic zucchini     Breakfast   cup leftover breakfast casserole    cup no sugar added applesauce with 1 Tbsp. unflavored protein powder and a sprinkle of cinnamon    Lunch 3 oz shrimp with 1-2 Tbsp. low-sugar cocktail sauce for dipping    c. whole wheat pasta drizzled with   tsp. olive oil   Supplement 1 cup skim/1% milk with scoop of protein powder (if needed between meals)   Dinner Grilled, seasoned kebob with 2 oz lean beef and   cup vegetables     Breakfast Breakfast pizza:   [place] Thin spread with 1 Tbsp. low sugar spaghetti sauce,   cup shredded low fat cheese, melted and 1 slice of [place] platt     cup fresh fruit mixed with chopped almonds   Lunch   cup black bean soup  4-5 whole grain crackers   Dinner 3 oz  tilapia with lemon pepper seasoning    cup stewed tomatoes   Supplement 1 string cheese (if needed between meals)     Breakfast 2 hard boiled eggs (discard 1 egg yolk)    whole wheat English Muffin with 1 tsp. low sugar jelly   Lunch   cup leftover black bean soup topped with 1-2 Tbsp. low fat cheese  2-3 light [place] Krisp crackers   Supplement Approved protein supplement (if needed between meals)   Dinner 3 oz sirloin steak    cup steamed broccoli

## 2024-04-01 ENCOUNTER — VIRTUAL VISIT (OUTPATIENT)
Dept: URGENT CARE | Facility: CLINIC | Age: 39
End: 2024-04-01
Payer: COMMERCIAL

## 2024-04-01 DIAGNOSIS — R68.89 FLU-LIKE SYMPTOMS: Primary | ICD-10-CM

## 2024-04-01 PROCEDURE — 99441 PR PHYSICIAN TELEPHONE EVALUATION 5-10 MIN: CPT | Mod: 93

## 2024-04-01 RX ORDER — OSELTAMIVIR PHOSPHATE 75 MG/1
75 CAPSULE ORAL 2 TIMES DAILY
Qty: 10 CAPSULE | Refills: 0 | Status: SHIPPED | OUTPATIENT
Start: 2024-04-01 | End: 2024-04-06

## 2024-04-01 NOTE — PROGRESS NOTES
Yokasta is a 38 year old who is being evaluated via a billable telephone visit.    What phone number would you like to be contacted at? [phone]  How would you like to obtain your AVS? [facility]      Assessment & Plan     (R68.89) Flu-like symptoms  (primary encounter diagnosis)    Plan: oseltamivir (TAMIFLU) 75 MG capsule        Subjective   Yokasta is a 38 year old, presenting for the following health issues:  FLU LIKE    HPI   Covid negative  Fatigue aches chills fever cough congestion  Hydrated   No sob wheezing  Taking tylenol      Objective         Vitals:  No vitals were obtained today due to virtual visit.    Physical Exam   GENERAL: alert and no distress  RESP: No audible wheeze, cough  PSYCH: Appropriate affect, tone, and pace of words    Telephone time spent 10 minutes  KEELY Castellon CNP  Signed Electronically by: [facility]

## 2024-05-08 NOTE — LETTER
3/3/2022         RE: Yokasta Gary  [address] [facility] [address]        Dear Colleague,    Thank you for referring your patient, Yokasta Gary, to the [facility] SURGERY CLINIC AND BARIATRICS [facility]. Please see a copy of my visit note below.    Video-Visit Details    Type of service:  Video Visit    Video Start Time (time video started): 11 AM    Video End Time (time video stopped): 11:38 AM    Originating Location (pt. Location): Home    Distant Location (provider location):  Home office    Mode of Communication:  Video Conference via [facility]    Physician has received verbal consent for a Video Visit from the patient? Yes    Yokasta has made quality changes in eating and lifestyle and appears more mindful about her eating. She is now ready to proceed with surgery. A report will be sent to the clinic. F50.9; E66.01    Brody Tobias, PhD            Again, thank you for allowing me to participate in the care of your patient.        Sincerely,        Brody Tobias, PhD     Detail Level: Detailed Comment: Mild flare in setting of new Vitamin C serum. Will start topical clindamycin mixed w/ OTC BPO 2x daily until clear Render Risk Assessment In Note?: no Comment: Pilar cyst, discussed WLE. Will schedule in an 11:30 slot PRN Comment: Initiate ketoconazole cream, SE and usage discussed. Recommends Zeasorb powder for prevention.

## 2024-05-10 ENCOUNTER — ANCILLARY PROCEDURE (OUTPATIENT)
Dept: MAMMOGRAPHY | Facility: CLINIC | Age: 39
End: 2024-05-10
Attending: FAMILY MEDICINE
Payer: COMMERCIAL

## 2024-05-10 DIAGNOSIS — Z12.31 VISIT FOR SCREENING MAMMOGRAM: ICD-10-CM

## 2024-05-10 PROCEDURE — 77067 SCR MAMMO BI INCL CAD: CPT

## 2024-05-11 ENCOUNTER — HEALTH MAINTENANCE LETTER (OUTPATIENT)
Age: 39
End: 2024-05-11

## 2024-07-26 ENCOUNTER — MYC MEDICAL ADVICE (OUTPATIENT)
Dept: SURGERY | Facility: CLINIC | Age: 39
End: 2024-07-26
Payer: COMMERCIAL

## 2024-07-26 DIAGNOSIS — K91.2 POSTOPERATIVE MALABSORPTION: ICD-10-CM

## 2024-07-26 DIAGNOSIS — Z86.39 HX OF DIABETES MELLITUS: ICD-10-CM

## 2024-07-26 DIAGNOSIS — K90.9 INTESTINAL MALABSORPTION, UNSPECIFIED TYPE: ICD-10-CM

## 2024-07-26 DIAGNOSIS — Z98.84 HX OF GASTRIC BYPASS: Primary | ICD-10-CM

## 2024-07-26 NOTE — TELEPHONE ENCOUNTER
Dennise Sweet, RN  P Bariatric Surgery Support [facility]  , 8/26/2024, labs at [facility] (pt will make appt)

## 2024-08-22 ENCOUNTER — LAB (OUTPATIENT)
Dept: LAB | Facility: CLINIC | Age: 39
End: 2024-08-22
Payer: COMMERCIAL

## 2024-08-22 DIAGNOSIS — Z98.84 HX OF GASTRIC BYPASS: Primary | ICD-10-CM

## 2024-08-22 DIAGNOSIS — K90.9 INTESTINAL MALABSORPTION, UNSPECIFIED TYPE: ICD-10-CM

## 2024-08-22 DIAGNOSIS — Z86.39 HX OF DIABETES MELLITUS: ICD-10-CM

## 2024-08-22 DIAGNOSIS — K91.2 POSTOPERATIVE MALABSORPTION: ICD-10-CM

## 2024-08-22 PROBLEM — R87.619 ATYPICAL GLANDULAR CELLS ON CERVICAL PAP SMEAR: Status: ACTIVE | Noted: 2024-08-22

## 2024-08-22 LAB
ALBUMIN SERPL BCG-MCNC: 4 G/DL (ref 3.5–5.2)
ALP SERPL-CCNC: 73 U/L (ref 40–150)
ALT SERPL W P-5'-P-CCNC: 22 U/L (ref 0–50)
ANION GAP SERPL CALCULATED.3IONS-SCNC: 10 MMOL/L (ref 7–15)
AST SERPL W P-5'-P-CCNC: 28 U/L (ref 0–45)
BILIRUB SERPL-MCNC: 1.1 MG/DL
BUN SERPL-MCNC: 9.8 MG/DL (ref 6–20)
CALCIUM SERPL-MCNC: 8.9 MG/DL (ref 8.8–10.4)
CHLORIDE SERPL-SCNC: 104 MMOL/L (ref 98–107)
CHOLEST SERPL-MCNC: 153 MG/DL
CREAT SERPL-MCNC: 0.75 MG/DL (ref 0.51–0.95)
EGFRCR SERPLBLD CKD-EPI 2021: >90 ML/MIN/1.73M2
ERYTHROCYTE [DISTWIDTH] IN BLOOD BY AUTOMATED COUNT: 11.8 % (ref 10–15)
FASTING STATUS PATIENT QL REPORTED: YES
FASTING STATUS PATIENT QL REPORTED: YES
FERRITIN SERPL-MCNC: 73 NG/ML (ref 6–175)
FOLATE SERPL-MCNC: 25.1 NG/ML (ref 4.6–34.8)
GLUCOSE SERPL-MCNC: 89 MG/DL (ref 70–99)
HBA1C MFR BLD: 5.2 % (ref 0–5.6)
HCO3 SERPL-SCNC: 26 MMOL/L (ref 22–29)
HCT VFR BLD AUTO: 43.7 % (ref 35–47)
HDLC SERPL-MCNC: 64 MG/DL
HGB BLD-MCNC: 15.1 G/DL (ref 11.7–15.7)
LDLC SERPL CALC-MCNC: 68 MG/DL
MCH RBC QN AUTO: 29.2 PG (ref 26.5–33)
MCHC RBC AUTO-ENTMCNC: 34.6 G/DL (ref 31.5–36.5)
MCV RBC AUTO: 85 FL (ref 78–100)
NONHDLC SERPL-MCNC: 89 MG/DL
PLATELET # BLD AUTO: 203 10E3/UL (ref 150–450)
POTASSIUM SERPL-SCNC: 4.2 MMOL/L (ref 3.4–5.3)
PROT SERPL-MCNC: 6.4 G/DL (ref 6.4–8.3)
PTH-INTACT SERPL-MCNC: 35 PG/ML (ref 15–65)
RBC # BLD AUTO: 5.17 10E6/UL (ref 3.8–5.2)
SODIUM SERPL-SCNC: 140 MMOL/L (ref 135–145)
TRIGL SERPL-MCNC: 104 MG/DL
VIT B12 SERPL-MCNC: 1732 PG/ML (ref 232–1245)
VIT D+METAB SERPL-MCNC: 48 NG/ML (ref 20–50)
WBC # BLD AUTO: 5.4 10E3/UL (ref 4–11)

## 2024-08-22 PROCEDURE — 84590 ASSAY OF VITAMIN A: CPT | Mod: 90

## 2024-08-22 PROCEDURE — 80061 LIPID PANEL: CPT

## 2024-08-22 PROCEDURE — 83036 HEMOGLOBIN GLYCOSYLATED A1C: CPT

## 2024-08-22 PROCEDURE — 82728 ASSAY OF FERRITIN: CPT

## 2024-08-22 PROCEDURE — 82306 VITAMIN D 25 HYDROXY: CPT

## 2024-08-22 PROCEDURE — 82607 VITAMIN B-12: CPT

## 2024-08-22 PROCEDURE — 80053 COMPREHEN METABOLIC PANEL: CPT

## 2024-08-22 PROCEDURE — 85027 COMPLETE CBC AUTOMATED: CPT

## 2024-08-22 PROCEDURE — 84425 ASSAY OF VITAMIN B-1: CPT | Mod: 90

## 2024-08-22 PROCEDURE — 82746 ASSAY OF FOLIC ACID SERUM: CPT

## 2024-08-22 PROCEDURE — 83970 ASSAY OF PARATHORMONE: CPT

## 2024-08-22 PROCEDURE — 36415 COLL VENOUS BLD VENIPUNCTURE: CPT

## 2024-08-22 PROCEDURE — 99000 SPECIMEN HANDLING OFFICE-LAB: CPT

## 2024-08-25 LAB
ANNOTATION COMMENT IMP: NORMAL
RETINYL PALMITATE SERPL-MCNC: 0.04 MG/L
VIT A SERPL-MCNC: 0.71 MG/L

## 2024-08-26 ENCOUNTER — OFFICE VISIT (OUTPATIENT)
Dept: SURGERY | Facility: CLINIC | Age: 39
End: 2024-08-26
Payer: COMMERCIAL

## 2024-08-26 VITALS
SYSTOLIC BLOOD PRESSURE: 122 MMHG | DIASTOLIC BLOOD PRESSURE: 70 MMHG | BODY MASS INDEX: 36.44 KG/M2 | WEIGHT: 198 LBS | HEIGHT: 62 IN

## 2024-08-26 DIAGNOSIS — Z86.39 HX OF DIABETES MELLITUS: ICD-10-CM

## 2024-08-26 DIAGNOSIS — E11.9 TYPE II DIABETES MELLITUS, WELL CONTROLLED (H): ICD-10-CM

## 2024-08-26 DIAGNOSIS — K91.2 POSTOPERATIVE MALABSORPTION: Primary | ICD-10-CM

## 2024-08-26 DIAGNOSIS — Z98.84 HX OF GASTRIC BYPASS: ICD-10-CM

## 2024-08-26 LAB — VIT B1 PYROPHOSHATE BLD-SCNC: 166 NMOL/L

## 2024-08-26 PROCEDURE — 99214 OFFICE O/P EST MOD 30 MIN: CPT | Performed by: EMERGENCY MEDICINE

## 2024-08-26 NOTE — PROGRESS NOTES
Bariatric Follow Up Visit with a History of Previous Bariatric Surgery     Date of visit: 8/25/2024  Physician: North Bray MD, MD  Primary Care Provider:  Dennise Ruff Amkarina Gary   39 year old  female    Date of Surgery: 8/29/22  Initial Weight: 280 lbs  Initial BMI: 49.6  Today's Weight:   Wt Readings from Last 1 Encounters:   08/26/24 89.8 kg (198 lb)     Weight history:   Wt Readings from Last 4 Encounters:   08/26/24 89.8 kg (198 lb)   09/08/23 91.7 kg (202 lb 1.6 oz)   04/14/23 93.4 kg (206 lb)   08/29/22 120.6 kg (265 lb 14.4 oz)      Body mass index is 36.21 kg/m .    Lab Results   Component Value Date    A1C 5.2 08/22/2024    A1C 5.1 09/01/2023    A1C 5.0 04/10/2023    A1C 6.9 08/10/2022    A1C 6.8 02/04/2022       Assessment and Plan     Assessment: Yokasta is a 39 year old year old female who is 2 years s/p  Lupe en Y Gastric Bypass with Dr. Mcgraw.  In the interim she's done well overall. Feels good about progress but is worried that residual weight will impact her diabetes. .    Bariatric labs show continued  excellent control of her previous type II diabetes (also on Mounjaro 5mg/week up until about March and did well with it but then ran out of supply and has been off it since April '24), good vitamin support, B12 excess such that she can likely get by with once weekly dosing for the prevention of deficiency over her lifetime in light of her malabsorptive procedure.   .  her weight is down 82 lbs from introductory weight, a 29.3% total body weight reduction.    Yokasta Gary feels as if she has achieved many of the goals she hoped to accomplish through bariatric surgery and weight loss.    Encounter Diagnoses   Name Primary?    Postoperative malabsorption Yes    Type II diabetes mellitus, well controlled (H)     Hx of diabetes mellitus     Hx of gastric bypass          Current Outpatient Medications:     calcium citrate (CITRACAL) 950 (200 Ca) MG tablet, Take 1 tablet by mouth 2 times  daily, Disp: , Rfl:     Cyanocobalamin (B-12) 1000 MCG SUBL, Take 3 days weekly (Patient taking differently: Take 1,000 mcg by mouth daily. Take 3 days weekly), Disp: 150 tablet, Rfl: 1    levonorgestrel (MIRENA, 52 MG,) 20 MCG/24HR IUD, 1 each by Intrauterine route once, Disp: , Rfl:     Pediatric Multivitamins-Iron (MULTIVITAMINS PLUS IRON CHILD) 18 MG CHEW, Take 1 chew tab by mouth 2 times daily Ok to substitute with any chewable that contains 18 mg of iron, Vitamin A, Thiamine and Zinc., Disp: 180 tablet, Rfl: 3    [START ON 8/30/2024] tirzepatide (MOUNJARO) 2.5 MG/0.5ML pen, Inject 2.5 mg subcutaneously every 7 days. Do not start before August 30, 2024., Disp: 2 mL, Rfl: 0    [START ON 9/20/2024] tirzepatide (MOUNJARO) 5 MG/0.5ML pen, Inject 5 mg subcutaneously every 7 days. Do not start before September 20, 2024., Disp: 2 mL, Rfl: 0    vitamin D3 (CHOLECALCIFEROL) 50 mcg (2000 units) tablet, Take 1 tablet by mouth daily, Disp: , Rfl:     tirzepatide (MOUNJARO) 2.5 MG/0.5ML pen, Inject 2.5 mg Subcutaneous every 7 days (Patient not taking: Reported on 8/26/2024), Disp: 2 mL, Rfl: 0    tirzepatide (MOUNJARO) 5 MG/0.5ML pen, Inject 5 mg Subcutaneous every 7 days for 280 days (Patient not taking: Reported on 8/26/2024), Disp: 2 mL, Rfl: 9    Plan:    No follow-ups on file.    Bariatric Surgery Review     Interim History/LifeChanges: stable life.     Patient Concerns: following up, lack of supply of Mounjaro the last 3 months.  Appetite (1-10): high brain hunger still plagues her.  Felt excellent while on mounjaro but much higher snacking urge now.   GERD: no.    Reviewed whether any need/indication for screening EGD today and we will deferred.  Typically, a screening EGD is recommend post op year 2-3 if no symptoms to assess health of esophagus/bariatric surgery and sooner if difficult to control GERD or persistent pain/dysphagia sx despite behavior modification.    Medication changes: n/a    Vitamin Intake:   B-12  "  Yes but 5000mcg so 7-10 day dosing should suffice   MVI  yes   Vitamin D  yes   Calcium   2 daily.     Other  Protein shakes about 3x weekly.              LABS: \"Reviewed    Nausea no  Vomiting no  Constipation no  Diarrhea no  Rashes no  Hair Loss no  Reactive Hypoglycemia no  Light Headedness no   Moods no      Most recent labs:  Lab Results   Component Value Date    WBC 5.4 08/22/2024    HGB 15.1 08/22/2024    HCT 43.7 08/22/2024    MCV 85 08/22/2024     08/22/2024     Lab Results   Component Value Date    CHOL 153 08/22/2024     Lab Results   Component Value Date    HDL 64 08/22/2024     No components found for: \"LDLCALC\"  Lab Results   Component Value Date    TRIG 104 08/22/2024     No results found for: \"CHOLHDL\"  Lab Results   Component Value Date    ALT 22 08/22/2024    AST 28 08/22/2024    ALKPHOS 73 08/22/2024     No results found for: \"HGBA1C\"  Lab Results   Component Value Date    B12 1,732 (H) 08/22/2024     No components found for: \"VITDT1\"  Lab Results   Component Value Date    RADHA 73 08/22/2024     Lab Results   Component Value Date    PTHI 35 08/22/2024     Lab Results   Component Value Date    ZN 68.5 09/01/2023     Lab Results   Component Value Date    VIB1WB 190 (H) 09/01/2023     Lab Results   Component Value Date    TSH 1.23 02/04/2022     No results found for: \"TEST\"    Habits:  Tobacco/Nicotine/THC exposure? no   NSAID use? no   Alcohol use? no   Caffeine Habits? no       Exercise Routine: working out regularly: kickboxing/walking. Walks every day.   3 meals/day? yes  Protein 60-80g/day? yes  Water Separate from meals? yes  Calorie Containing Beverages: n/a  Restaurant eating/wk: not much, less than weekly.  Sleep Habits:  good.   CPAP Use? never  Contraception: IUD.and tubal ligation.IUD for heavy periods.  DeXA:n/a.  Discussed annual screening to start at age 45 and continue to age 55 if scoring \"low risk\". DEXA scan recommended at age 55 regardless as long as at least 2 years have " transpired from their bariatric surgery.    Social History     Social History     Socioeconomic History    Marital status:      Spouse name: Not on file    Number of children: Not on file    Years of education: Not on file    Highest education level: Not on file   Occupational History    Not on file   Tobacco Use    Smoking status: Former     Current packs/day: 0.00     Types: Cigarettes     Quit date: 2009     Years since quittin.9    Smokeless tobacco: Never   Substance and Sexual Activity    Alcohol use: Yes     Comment: one per month    Drug use: No    Sexual activity: Yes     Partners: Male     Birth control/protection: I.U.D., Female Surgical   Other Topics Concern    Not on file   Social History Narrative    Not on file     Social Determinants of Health     Financial Resource Strain: Not on file   Food Insecurity: Not on file   Transportation Needs: Not on file   Physical Activity: Not on file   Stress: Not on file   Social Connections: Not on file   Interpersonal Safety: Not on file   Housing Stability: Not on file       Past Medical History     Past Medical History:   Diagnosis Date    COVID-19     Marita 2021.    Dyspepsia     rare gaviscon use    Insulin controlled gestational diabetes mellitus (GDM) in second trimester 2018    Morbid (severe) obesity due to excess calories (H) 2022    Morbid obesity (H)     Type 2 diabetes mellitus without complication, without long-term current use of insulin (H) 2022    hx of gestational diabetes, A1c now 6.8% 2022 as she enters bariatric surgery program.     Past Surgical History:   Procedure Laterality Date    ANTERIOR CRUCIATE LIGAMENT REPAIR Left      SECTION N/A 10/17/2014    Procedure:  SECTION;  Surgeon: Maureen Chanel MD;  Location: [facility];  Service:      SECTION N/A 9/10/2018    Procedure:  SECTION, REPEAT;  Surgeon: Dorota Guzman MD;  Location: [facility] OR;  " Service:     LAPAROSCOPIC BYPASS GASTRIC N/A 2022    Procedure: CREATION, GASTRIC BYPASS, ERI - EN - Y LAPAROSCOPIC;  Surgeon: Alex Mcgraw MD;  Location: [facility] OR       Problem List     Patient Active Problem List   Diagnosis    Morbid obesity (H)    Type 2 diabetes mellitus without complication, without long-term current use of insulin (H)    Morbid (severe) obesity due to excess calories (H)    Atypical glandular cells on cervical Pap smear     Medications     [unfilled]  Surgical History     Past Surgical History  She has a past surgical history that includes  Section (N/A, 10/17/2014); Anterior Cruciate Ligament Repair (Left);  Section (N/A, 9/10/2018); and Laparoscopic bypass gastric (N/A, 2022).    Objective-Exam     Constitutional:  /70 (BP Location: Right arm, Patient Position: Sitting, Cuff Size: Adult Small)   Ht 1.575 m (5' 2\")   Wt 89.8 kg (198 lb)   BMI 36.21 kg/m    [unfilled]   General:  Pleasant and in no acute distress   Eyes:  EOMI  ENT:  Airway patent    Neck:  Respiratory: Normal respiratory effort, no cough,.  CV:  RRR  Gastrointestinal: nontender  Musculoskeletal: muscle mass WNL  Skin: color without pallor, hair thick.,   Psychiatric: alert and oriented X3, mood and affect normal    Counseling     We reviewed the important post op bariatric recommendations:  -eating 3 meals daily  -eating protein first, getting >60gm protein daily  -eating slowly, chewing food well  -avoiding/limiting calorie containing beverages  -drinking water 15-30 minutes before or after meals  -limiting restaurant or cafeteria eating to twice a week or less    We discussed the importance of restorative sleep and stress management in maintaining a healthy weight.  We discussed the National Weight Control Registry healthy weight maintenance strategies and ways to optimize metabolism.  We discussed the importance of physical activity including cardiovascular and " strength training in maintaining a healthier weight.    We discussed the importance of life-long vitamin supplementation and life-long  follow-up.    Yokasta was reminded that, to avoid marginal ulcers she should avoid tobacco at all, alcohol in excess, caffeine in excess, and NSAIDS (unless indicated for cardioprotection or othewise and opposed by a PPI).    North Bray MD    [facility]  2024  9:09 PM  [phone] (clinic phone)  [phone] (fax)    No images are attached to the encounter.  Medical Decision Makin minutes spent by me on the date of the encounter doing chart review, history and exam, documentation and further activities per the note

## 2024-08-26 NOTE — PATIENT INSTRUCTIONS
Plan:  Great work on over 29% total body weight reduction over the last 2 years. Diabetes well controlled but given residual Class II obesity (BMI of 36.2) today, risk for recurrence is higher than if under BMI of 35 so we'll continue Mounjaro support now that supplies are better.  Start 2.5mg/week for 4 weeks then increase to 5mg/week and we'll stay at that dose which worked well for you last winter prior to shortages.    2. Recheck with me in 4-5 months to make sure medication is tolerated well.     3. Continue good vitamin support. B12 can reduce to every 7-10 days if using the 5000mcg dose and once weekly if on 2500mcg or twice weekly if on 1000mcg SL doses.  Vitamin D, calcium and multivitamins to stay the same.      [facility]  Nutritional Guidelines  Gastric Bypass 18 Months Post Op and Beyond    General Guidelines and Helpful Hints:  Eat 3 meals per day + protein supplement(s). No snacks between meals.  Do not skip meals.  This can cause overeating at the next meal and will prevent adequate protein and nutritional intake.  Aim for 60-80 grams of protein per day.  Always eat your protein first. This assists with optimal nutrition and helps you stay full longer.  Depending on your portion size, you may need to drink approved protein supplement between meals to achieve protein goals. Follow recommendations of your Dietitian.   Eat your protein first, and then follow with fiber.   It is not necessary to count your fiber, but 15-20 grams per day is recommended.    Add fiber by including fruits, vegetables, whole grains, and beans.   Portions should remain about 1 cup per meal. Use measuring cups to be accurate.  Continue to use saucer/salad plates, infant/toddler silverware to keep portion sizes small and take small bites.  Eat S-[id number] to make each meal last 20-30 minutes. Always stop eating when satisfied.  Continue to use caution with foods containing skins, peels or membranes. Chew  "well!  Aim for 64 oz. of calorie-free fluids daily.  Continue to avoid caffeine and carbonation. If you choose to drink alcohol, do so in moderation.   Remember to avoid drinking during meals, 15-30 minutes before and 30 minutes after.  Exercise is rivera for continued weight loss and weight maintenance. 150 minutes weekly of moderate aerobic activity or 75 minutes of vigorous with 2 days or more a week of strength training. Try to get 20% or more of your steps each day at a brisk pace, as though hurrying to a bus stop. Look to get stronger this year.  If having trouble tolerating meat, try using a crock-pot, tinfoil tent, steamer or other moist cooking method to create tender meats. Add broth or low-fat gravy to help meat stay moist.   Avoid high sugar and high fat foods to prevent dumping syndrome.  Check nutrition labels for less than 10 grams of sugar and less than 10 grams of fat per serving.  Continue Taking Vitamins/Minerals:  1000 mcg of Sublingual B-12 at least 3 days weekly to average 350-500mcg/day. If using 2500mcg lozenges, 2 weekly.  If 5000 mcg, once weekly dosing works.  1 Complete Multivitamin with 18mg Iron twice daily (chewable or swallow tabs). Often sold as \"women's one a day\" if tablet but take twice daily.  500-600 mg Calcium Citrate twice daily (chewable or swallow tabs).  5000 IU Vitamin D3 daily.  If menstruating, you may need closer to 60mg of iron daily to prevent iron deficiency. An occasional \"boost\" of extra iron supplement during/after is reasonable if heavy flow.    Sample Grocery List    Protein:  Fat free Greek or light yogurt (less than 10 grams sugar)  Fat free or low-fat cottage cheese  String cheese or reduced fat cheese slices  Tuna, salmon, crab, egg, or chicken salad made with light or fat free mayonnaise  Egg or Egg Substitute  Lean/extra lean turkey, beef, bison, venison (ground, sirloin, round, flank)  Pork loin or tenderloin (grilled, baked, broiled)  Fish such as salmon, " tuna, trout, tilapia, etc. (grilled, baked, broiled)  Tender cuts of lean (skinless) turkey or chicken  Lean deli meats: turkey, lean ham, chicken, lean roast beef  Beans such as kidney, garbanzo, black, padilla, or low-fat/fat free refried beans  Peanut butter (natural preferred). Limit to 1 Tbsp. per day.  Low-fat meatloaf (made with lean ground beef or turkey)  Sloppy Joes made with low-sugar ketchup and lean ground beef or turkey  Soy or vegetable protein (i.e. vegan crumbles, soy/veggie burger, tofu)  Hummus    Vegetables:  Fresh: cooked or raw (as tolerated)  Frozen vegetables  Canned vegetables (low sodium or no salt added, rinse before cooking/eating)  (Ok to have skins/peels/membranes/seeds - just chew well)    Fruits:  Fresh fruit  Frozen fruit (no sugar added)  Canned fruit (packed in its own juice, NOT syrup)  (Ok to have skins/peels/membranes/seeds - just chew well)    Starch:  Unsweetened whole-grain hot cereal (or high fiber cold cereal, dry)  Toasted whole wheat bread or Vienna Thins  Whole grain crackers  Baked /boiled/mashed potato/sweet potato  Cooked whole grain pasta, brown rice, or other cooked whole grains  Starchy vegetables: corn, peas, winter squash    Protein Supplement:   Ready to drink protein shake with:  15-30 grams protein per serving  Less than 10 grams total carbohydrate per serving   Protein powder mixed with:   Skim or 1% milk  Low fat or fat free Lactaid milk, plain or no sugar added soymilk  Water     Fats: (use in moderation)  1 teaspoon of soft tub margarine  1 teaspoon olive oil, canola oil, or peanut oil  1 tablespoon of low-fat tobin or salad dressing     Sample Menu for 18+ months after Gastric Bypass    You do NOT need to eat/drink the full portion sizes listed below  Always stop when you are satisfied    Breakfast   cup 1% cottage cheese     cup mixed berries   Lunch 2 oz lean roast beef on   [place] Thin with 1 tsp. light tobin    small tomato, chopped, mixed with 1 tsp.  light vinaigrette dressing   Supplement Approved protein supplement (if needed between meals)   Dinner 2 oz grilled salmon    cup salad greens with 1 tsp. light salad dressing and 1 tsp. ground flax seed    cup quinoa or brown rice     Breakfast   cup egg substitute with   cup sautéed chopped vegetables  2 light [place] Krisp crackers   Lunch Tuna Melt:   cup tuna mixed with 1 tsp. light tobin over   [place] Thin. Top with 2-3 slices cucumber and 1 oz slice of low fat cheese   Supplement 1 cup skim milk (if needed between meals)   Dinner 3 oz  grilled, broiled, or baked seasoned skinless chicken breast    cup asparagus     Breakfast   cup plain oatmeal made with skim or 1% milk with 1 Tbsp. flavored/unflavored protein powder added  1 mozzarella string cheese   Lunch 2 oz deli turkey breast  1/3 cup salad with 1 tsp. light salad dressing, 1/8 of a whole avocado and 1 Tbsp. sunflower seeds   Dinner 3 oz. pork loin made in a crock pot, seasoned with a spice rub    cup cooked carrots   Supplement Approved protein supplement (if needed between meals)     Breakfast 1 cup breakfast casserole made with egg substitute, turkey sausage,  and steamed, chopped bell peppers   Supplement  1 cup light Greek yogurt (if needed between meals)   Lunch 2 oz. teriyaki turkey    cup mashed sweet potato with 1-2 spritzes of spray butter    cup fresh pineapple   Dinner 3 oz low fat meatloaf    cup roasted garlic zucchini     Breakfast   cup leftover breakfast casserole    cup no sugar added applesauce with 1 Tbsp. unflavored protein powder and a sprinkle of cinnamon    Lunch 3 oz shrimp with 1-2 Tbsp. low-sugar cocktail sauce for dipping    c. whole wheat pasta drizzled with   tsp. olive oil   Supplement 1 cup skim/1% milk with scoop of protein powder (if needed between meals)   Dinner Grilled, seasoned kebob with 2 oz lean beef and   cup vegetables     Breakfast Breakfast pizza:   [place] Thin spread with 1 Tbsp. low sugar spaghetti sauce,    cup shredded low fat cheese, melted and 1 slice of [place] platt     cup fresh fruit mixed with chopped almonds   Lunch   cup black bean soup  4-5 whole grain crackers   Dinner 3 oz  tilapia with lemon pepper seasoning    cup stewed tomatoes   Supplement 1 string cheese (if needed between meals)     Breakfast 2 hard boiled eggs (discard 1 egg yolk)    whole wheat English Muffin with 1 tsp. low sugar jelly   Lunch   cup leftover black bean soup topped with 1-2 Tbsp. low fat cheese  2-3 light [place] Krisp crackers   Supplement Approved protein supplement (if needed between meals)   Dinner 3 oz sirloin steak    cup steamed broccoli      LEAN PROTEIN SOURCES  Getting 20-30 grams of protein, 3 meals daily, is appropriate for most people, some need more but more than about 40 grams per meal is not useful.  General rule is drinking one ounce of water per gram of protein eaten over the course of the day:  70 grams of protein each day, drink 70 oz of water.  Protein Source Portion Calories Grams of Protein                           Nonfat, plain Greek yogurt    (10 grams sugar or less) 3/4 cup (6 oz)  12-17   Light Yogurt (10 grams sugar or less) 3/4 cup (6 oz)  6-8   Protein Shake 1 shake 110-180 15-30   Skim/1% Milk or lactose-free milk 1 cup ( 8 oz)  8   Plain or light, flavored soymilk 1 cup  7-8   Plain or light, hemp milk 1 cup 110 6   Fat Free or 1% Cottage Cheese 1/2 cup 90 15   Part skim ricotta cheese 1/2 cup 100 14   Part skim or reduced fat cheese slices 1 ounce 65-80 8     Mozzarella String Cheese 1 80 8   Canned tuna, chicken, crab or salmon  (canned in water)  1/2 cup 100 15-20   White fish (broiled, grilled, baked) 3 ounces 100 21   [place]/Tuna (broiled, grilled, baked) 3 ounces 150-180 21   Shrimp, Scallops, Lobster, Crab 3 ounces 100 21   Pork loin, Pork Tenderloin 3 ounces 150 21   Boneless, skinless chicken /turkey breast                          (broiled, grilled, baked) 3 ounces  120 21   [place], [place], [place], and Venison 3 ounces 120 21   Lean cuts of red meat and pork (sirloin,   round, tenderloin, flank, ground 93%-96%) 3 ounces 170 21   Lean or Extra Lean Ground Turkey 1/2 cup 150 20   90-95% Lean [place] Burger 1 veronica 140-180 21   Low-fat casserole with lean meat 3/4 cup 200 17   Luncheon Meats                                                        (turkey, lean ham, roast beef, chicken) 3 ounces 100 21   Egg (boiled, poached, scrambled) 1 Egg 60 7   Egg Substitute 1/2 cup 70 10   Nuts (limit to 1 serving per day)  3 Tbsp. 150 7   Nut [place] (peanut, almond)  Limit to 1 serving or less daily 1 Tbsp. 90 4   Soy Burger (varies) 1  15   Garbanzo, Black, [place] Beans 1/2 cup 110 7   Refried Beans 1/2 cup 100 7   Kidney and Lima beans 1/2 cup 110 7   Tempeh 3 oz 175 18   Vegan crumbles 1/2 cup 100 14   Tofu 1/2 cup 110 14   Chili (beans and extra lean beef or turkey) 1 cup 200 23   Lentil Stew/Soup 1 cup 150 12   Black Bean Soup 1 cup 175 12         Zepbound (Tirzepatide) is a very effective satiety boosting appetite suppressant that elevates satiety hormones GLP1 and GIP. It needs to be ramped up slowly to be tolerated adequately.  About 1/10 people will not tolerate this medication. Each month, you move up to a higher dose until eventually reaching the 10mg/week dose if tolerated with further ramping to follow if needed. If intolerant or severe side effects, a dose decrease would be wise, so keep me posted if not tolerated the ramping well. This may be a longer term medication based on individual needs/physiology and appetite control.     Injections can be given after cleansing the skin with alcohol prep pad or swab (available OTC).     Stop Zepbound if severe abdominal pain/vomiting/rash/throat swelling or constant nausea that prevents adequate food/water intake. Stop 2-3 weeks prior to any planned general anesthesia surgeries to reduce risk for something called a post operative  ileus.     Gallstones can occur in about 1% of patients on this medication so update me if increase right upper abdominal pain after eating.     Start meals with protein first, separate beverages from meals by 20 minutes and work hard in between meals to get your 64-75 oz of water daily to reduce risks for severe constipation. Consider a fiber supplement like powdered psyllium husk in 12 oz water each night, stool softerners as needed and Miralax or milk of Magnesia if more than 3 days have passed without a Bowel Movement. Some other options include:  For Prevention and Treatment of Constipation when on Semaglutide     From least aggressive to most aggressive:     Move: Wallking is essential - the more we move, the more our bowels move  Water: Drink water - 64oz or more a day  Go when you need to go. Don't wait. The longer you wait, the harder it gets.  Fiber: Fruit, raw veggies, nuts, whole grains, prune each night, flax/santana seeds added into meals can all be helpful.   Stool Softeners: if constipation is mild and for maintenance  Gentle laxatives: Miralax, senokot, dulcolax , Smooth move tea as needed     More aggressive (and typically won't get to this point)  Milk of Magnesia  Mag Citrate (what you drink before a colonoscopy)  Suppositories  Enema      Check out [employer] for patient resources.  If you have weekends off, I recommend dosing Friday evenings.     Some people starve on this medication if not mindful about food intake. I recommend starting meals with the protein part of your meal first, chew thoroughly and separate beverages from meals by about 20 minutes to make sure you get your nourishment in first. Include vegetables/complex carbohydrates and unsaturated fat as part of your balanced diet but group these at the end of the meal, after your protein is mostly gone. Satiety will kick in too early if drinking too much with meals and under-nourishment can result.     It's not a bad idea to take a  complete multivitamin most days of the week if using this medication. Lower iron content tends to be less constipating.     Adequate hydration is essential for feeling your best, efficient fat burning, waste elimination and constipation prevention. For those without fluid restrictions due to other disease, the goal is at least one ounce of water per gram of protein consumed with a  minimum of 64oz/day goal.     Pancreatitis is a very rare but potentially serious side effect. Stop Zepbound if severe mid abdominal pain/burning in nature or if unable to eat/drink due to severe nausea/discomfort.   People with strong history of pancreatitis without clear cause should stay clear of this medication as should those planning to get pregnant, those with strong personal or family history for medullary thyroid cancer or Multiple Endocrine Neoplasia (rare).     Stop Zepbound at least 2 weeks prior to any planned surgery.  Stop until fully recovered if unexpected/emergent surgery is needed with anticipation that re-ramping will be needed if off longer than 14-16 days since last dose.    Kind Regards,  North Bray MD  [facility]

## 2024-08-26 NOTE — LETTER
8/26/2024      Yokasta Gary  [id number]   [place] [address]      Dear Colleague,    Thank you for referring your patient, Yokasta Gary, to the [facility] [place]. Please see a copy of my visit note below.    Bariatric Follow Up Visit with a History of Previous Bariatric Surgery     Date of visit: 8/25/2024  Physician: North Bray MD, MD  Primary Care Provider:  Dennise Ruff  Yokasta Gary   39 year old  female    Date of Surgery: 8/29/22  Initial Weight: 280 lbs  Initial BMI: 49.6  Today's Weight:   Wt Readings from Last 1 Encounters:   08/26/24 89.8 kg (198 lb)     Weight history:   Wt Readings from Last 4 Encounters:   08/26/24 89.8 kg (198 lb)   09/08/23 91.7 kg (202 lb 1.6 oz)   04/14/23 93.4 kg (206 lb)   08/29/22 120.6 kg (265 lb 14.4 oz)      Body mass index is 36.21 kg/m .    Lab Results   Component Value Date    A1C 5.2 08/22/2024    A1C 5.1 09/01/2023    A1C 5.0 04/10/2023    A1C 6.9 08/10/2022    A1C 6.8 02/04/2022       Assessment and Plan     Assessment: Yokasta is a 39 year old year old female who is 2 years s/p  Lupe en Y Gastric Bypass with Dr. Mcgraw.  In the interim she's done well overall. Feels good about progress but is worried that residual weight will impact her diabetes. .    Bariatric labs show continued  excellent control of her previous type II diabetes (also on Mounjaro 5mg/week up until about March and did well with it but then ran out of supply and has been off it since April '24), good vitamin support, B12 excess such that she can likely get by with once weekly dosing for the prevention of deficiency over her lifetime in light of her malabsorptive procedure.   .  her weight is down 82 lbs from introductory weight, a 29.3% total body weight reduction.    Yokasta Gary feels as if she has achieved many of the goals she hoped to accomplish through bariatric surgery and weight loss.    Encounter Diagnoses   Name Primary?      Postoperative malabsorption Yes     Type II diabetes mellitus, well controlled (H)      Hx of diabetes mellitus      Hx of gastric bypass          Current Outpatient Medications:      calcium citrate (CITRACAL) 950 (200 Ca) MG tablet, Take 1 tablet by mouth 2 times daily, Disp: , Rfl:      Cyanocobalamin (B-12) 1000 MCG SUBL, Take 3 days weekly (Patient taking differently: Take 1,000 mcg by mouth daily. Take 3 days weekly), Disp: 150 tablet, Rfl: 1     levonorgestrel (MIRENA, 52 MG,) 20 MCG/24HR IUD, 1 each by Intrauterine route once, Disp: , Rfl:      Pediatric Multivitamins-Iron (MULTIVITAMINS PLUS IRON CHILD) 18 MG CHEW, Take 1 chew tab by mouth 2 times daily Ok to substitute with any chewable that contains 18 mg of iron, Vitamin A, Thiamine and Zinc., Disp: 180 tablet, Rfl: 3     [START ON 8/30/2024] tirzepatide (MOUNJARO) 2.5 MG/0.5ML pen, Inject 2.5 mg subcutaneously every 7 days. Do not start before August 30, 2024., Disp: 2 mL, Rfl: 0     [START ON 9/20/2024] tirzepatide (MOUNJARO) 5 MG/0.5ML pen, Inject 5 mg subcutaneously every 7 days. Do not start before September 20, 2024., Disp: 2 mL, Rfl: 0     vitamin D3 (CHOLECALCIFEROL) 50 mcg (2000 units) tablet, Take 1 tablet by mouth daily, Disp: , Rfl:      tirzepatide (MOUNJARO) 2.5 MG/0.5ML pen, Inject 2.5 mg Subcutaneous every 7 days (Patient not taking: Reported on 8/26/2024), Disp: 2 mL, Rfl: 0     tirzepatide (MOUNJARO) 5 MG/0.5ML pen, Inject 5 mg Subcutaneous every 7 days for 280 days (Patient not taking: Reported on 8/26/2024), Disp: 2 mL, Rfl: 9    Plan:    No follow-ups on file.    Bariatric Surgery Review     Interim History/LifeChanges: stable life.     Patient Concerns: following up, lack of supply of Mounjaro the last 3 months.  Appetite (1-10): high brain hunger still plagues her.  Felt excellent while on mounjaro but much higher snacking urge now.   GERD: no.    Reviewed whether any need/indication for screening EGD today and we will  "deferred.  Typically, a screening EGD is recommend post op year 2-3 if no symptoms to assess health of esophagus/bariatric surgery and sooner if difficult to control GERD or persistent pain/dysphagia sx despite behavior modification.    Medication changes: n/a    Vitamin Intake:   B-12   Yes but 5000mcg so 7-10 day dosing should suffice   MVI  yes   Vitamin D  yes   Calcium   2 daily.     Other  Protein shakes about 3x weekly.              LABS: \"Reviewed    Nausea no  Vomiting no  Constipation no  Diarrhea no  Rashes no  Hair Loss no  Reactive Hypoglycemia no  Light Headedness no   Moods no      Most recent labs:  Lab Results   Component Value Date    WBC 5.4 08/22/2024    HGB 15.1 08/22/2024    HCT 43.7 08/22/2024    MCV 85 08/22/2024     08/22/2024     Lab Results   Component Value Date    CHOL 153 08/22/2024     Lab Results   Component Value Date    HDL 64 08/22/2024     No components found for: \"LDLCALC\"  Lab Results   Component Value Date    TRIG 104 08/22/2024     No results found for: \"CHOLHDL\"  Lab Results   Component Value Date    ALT 22 08/22/2024    AST 28 08/22/2024    ALKPHOS 73 08/22/2024     No results found for: \"HGBA1C\"  Lab Results   Component Value Date    B12 1,732 (H) 08/22/2024     No components found for: \"VITDT1\"  Lab Results   Component Value Date    RADHA 73 08/22/2024     Lab Results   Component Value Date    PTHI 35 08/22/2024     Lab Results   Component Value Date    ZN 68.5 09/01/2023     Lab Results   Component Value Date    VIB1WB 190 (H) 09/01/2023     Lab Results   Component Value Date    TSH 1.23 02/04/2022     No results found for: \"TEST\"    Habits:  Tobacco/Nicotine/THC exposure? no   NSAID use? no   Alcohol use? no   Caffeine Habits? no       Exercise Routine: working out regularly: kickboxing/walking. Walks every day.   3 meals/day? yes  Protein 60-80g/day? yes  Water Separate from meals? yes  Calorie Containing Beverages: n/a  Restaurant eating/wk: not much, less than " "weekly.  Sleep Habits:  good.   CPAP Use? never  Contraception: IUD.and tubal ligation.IUD for heavy periods.  DeXA:n/a.  Discussed annual screening to start at age 45 and continue to age 55 if scoring \"low risk\". DEXA scan recommended at age 55 regardless as long as at least 2 years have transpired from their bariatric surgery.    Social History     Social History     Socioeconomic History     Marital status:      Spouse name: Not on file     Number of children: Not on file     Years of education: Not on file     Highest education level: Not on file   Occupational History     Not on file   Tobacco Use     Smoking status: Former     Current packs/day: 0.00     Types: Cigarettes     Quit date: 2009     Years since quittin.9     Smokeless tobacco: Never   Substance and Sexual Activity     Alcohol use: Yes     Comment: one per month     Drug use: No     Sexual activity: Yes     Partners: Male     Birth control/protection: I.U.D., Female Surgical   Other Topics Concern     Not on file   Social History Narrative     Not on file     Social Determinants of Health     Financial Resource Strain: Not on file   Food Insecurity: Not on file   Transportation Needs: Not on file   Physical Activity: Not on file   Stress: Not on file   Social Connections: Not on file   Interpersonal Safety: Not on file   Housing Stability: Not on file       Past Medical History     Past Medical History:   Diagnosis Date     COVID-19     [place] 2021.     Dyspepsia     rare gaviscon use     Insulin controlled gestational diabetes mellitus (GDM) in second trimester 2018     Morbid (severe) obesity due to excess calories (H) 2022     Morbid obesity (H)      Type 2 diabetes mellitus without complication, without long-term current use of insulin (H) 2022    hx of gestational diabetes, A1c now 6.8% 2022 as she enters bariatric surgery program.     Past Surgical History:   Procedure Laterality Date     ANTERIOR " "CRUCIATE LIGAMENT REPAIR Left       SECTION N/A 10/17/2014    Procedure:  SECTION;  Surgeon: Maureen Chanel MD;  Location: [facility];  Service:       SECTION N/A 9/10/2018    Procedure:  SECTION, REPEAT;  Surgeon: Dorota Guzman MD;  Location: [facility] OR;  Service:      LAPAROSCOPIC BYPASS GASTRIC N/A 2022    Procedure: CREATION, GASTRIC BYPASS, ERI - EN - Y LAPAROSCOPIC;  Surgeon: Alex Mcgraw MD;  Location: [facility]       Problem List     Patient Active Problem List   Diagnosis     Morbid obesity (H)     Type 2 diabetes mellitus without complication, without long-term current use of insulin (H)     Morbid (severe) obesity due to excess calories (H)     Atypical glandular cells on cervical Pap smear     Medications     [unfilled]  Surgical History     Past Surgical History  She has a past surgical history that includes  Section (N/A, 10/17/2014); Anterior Cruciate Ligament Repair (Left);  Section (N/A, 9/10/2018); and Laparoscopic bypass gastric (N/A, 2022).    Objective-Exam     Constitutional:  /70 (BP Location: Right arm, Patient Position: Sitting, Cuff Size: Adult Small)   Ht 1.575 m (5' 2\")   Wt 89.8 kg (198 lb)   BMI 36.21 kg/m    [unfilled]   General:  Pleasant and in no acute distress   Eyes:  EOMI  ENT:  Airway patent    Neck:  Respiratory: Normal respiratory effort, no cough,.  CV:  RRR  Gastrointestinal: nontender  Musculoskeletal: muscle mass WNL  Skin: color without pallor, hair thick.,   Psychiatric: alert and oriented X3, mood and affect normal    Counseling     We reviewed the important post op bariatric recommendations:  -eating 3 meals daily  -eating protein first, getting >60gm protein daily  -eating slowly, chewing food well  -avoiding/limiting calorie containing beverages  -drinking water 15-30 minutes before or after meals  -limiting restaurant or cafeteria eating to twice a week " or less    We discussed the importance of restorative sleep and stress management in maintaining a healthy weight.  We discussed the National Weight Control Registry healthy weight maintenance strategies and ways to optimize metabolism.  We discussed the importance of physical activity including cardiovascular and strength training in maintaining a healthier weight.    We discussed the importance of life-long vitamin supplementation and life-long  follow-up.    Yokasta was reminded that, to avoid marginal ulcers she should avoid tobacco at all, alcohol in excess, caffeine in excess, and NSAIDS (unless indicated for cardioprotection or othewise and opposed by a PPI).    North Bray MD    [facility].  2024  9:09 PM  [phone] (clinic phone)  [phone] (fax)    No images are attached to the encounter.  Medical Decision Makin minutes spent by me on the date of the encounter doing chart review, history and exam, documentation and further activities per the note    I, Yokasta Gary, give verbal consent for a resident to be present in today's visit.       Again, thank you for allowing me to participate in the care of your patient.        Sincerely,        North Bray MD

## 2024-09-28 ENCOUNTER — HEALTH MAINTENANCE LETTER (OUTPATIENT)
Age: 39
End: 2024-09-28

## 2024-11-14 ENCOUNTER — E-VISIT (OUTPATIENT)
Dept: FAMILY MEDICINE | Facility: CLINIC | Age: 39
End: 2024-11-14
Payer: COMMERCIAL

## 2024-11-14 DIAGNOSIS — T75.3XXA MOTION SICKNESS, INITIAL ENCOUNTER: Primary | ICD-10-CM

## 2024-11-14 RX ORDER — SCOLOPAMINE TRANSDERMAL SYSTEM 1 MG/1
1 PATCH, EXTENDED RELEASE TRANSDERMAL
Qty: 4 PATCH | Refills: 0 | Status: SHIPPED | OUTPATIENT
Start: 2024-11-14

## 2024-12-07 ENCOUNTER — HEALTH MAINTENANCE LETTER (OUTPATIENT)
Age: 39
End: 2024-12-07

## 2025-01-15 ENCOUNTER — E-VISIT (OUTPATIENT)
Dept: URGENT CARE | Facility: CLINIC | Age: 40
End: 2025-01-15
Payer: COMMERCIAL

## 2025-01-15 DIAGNOSIS — N39.0 ACUTE UTI (URINARY TRACT INFECTION): Primary | ICD-10-CM

## 2025-01-15 RX ORDER — NITROFURANTOIN 25; 75 MG/1; MG/1
100 CAPSULE ORAL 2 TIMES DAILY
Qty: 10 CAPSULE | Refills: 0 | Status: SHIPPED | OUTPATIENT
Start: 2025-01-15 | End: 2025-01-20

## 2025-01-16 NOTE — PATIENT INSTRUCTIONS
Dear Yokasta Gary    After reviewing your responses, I've been able to diagnose you with a urinary tract infection, which is a common infection of the bladder with bacteria.  This is not a sexually transmitted infection, though urinating immediately after intercourse can help prevent infections.  Drinking lots of fluids is also helpful to clear your current infection and prevent the next one.      I have sent a prescription for antibiotics to your pharmacy to treat this infection.    It is important that you take all of your prescribed medication even if your symptoms are improving after a few doses.  Taking all of your medicine helps prevent the symptoms from returning.     If your symptoms worsen, you develop pain in your back or stomach, develop fevers, or are not improving in 5 days, please contact your primary care provider for an appointment or visit any of our convenient Walk-in or Urgent Care Centers to be seen, which can be found on our website here.    Thanks again for choosing us as your health care partner,    Porsche Mcclain PA-C

## 2025-02-06 ENCOUNTER — APPOINTMENT (OUTPATIENT)
Dept: CT IMAGING | Facility: HOSPITAL | Age: 40
End: 2025-02-06
Payer: COMMERCIAL

## 2025-02-06 ENCOUNTER — HOSPITAL ENCOUNTER (EMERGENCY)
Facility: HOSPITAL | Age: 40
Discharge: HOME | End: 2025-02-06
Attending: STUDENT IN AN ORGANIZED HEALTH CARE EDUCATION/TRAINING PROGRAM
Payer: COMMERCIAL

## 2025-02-06 VITALS
DIASTOLIC BLOOD PRESSURE: 89 MMHG | TEMPERATURE: 97.5 F | HEIGHT: 63 IN | WEIGHT: 195 LBS | BODY MASS INDEX: 34.55 KG/M2 | HEART RATE: 94 BPM | RESPIRATION RATE: 18 BRPM | SYSTOLIC BLOOD PRESSURE: 142 MMHG | OXYGEN SATURATION: 98 %

## 2025-02-06 DIAGNOSIS — N20.1 LEFT URETERAL STONE: ICD-10-CM

## 2025-02-06 LAB
ALBUMIN SERPL BCG-MCNC: 4.7 G/DL (ref 3.5–5.2)
ALBUMIN UR-MCNC: 30 MG/DL
ALP SERPL-CCNC: 96 U/L (ref 40–150)
ALT SERPL W P-5'-P-CCNC: 17 U/L (ref 0–50)
ANION GAP SERPL CALCULATED.3IONS-SCNC: 12 MMOL/L (ref 7–15)
APPEARANCE UR: CLEAR
AST SERPL W P-5'-P-CCNC: 20 U/L (ref 0–45)
BILIRUB DIRECT SERPL-MCNC: 0.21 MG/DL (ref 0–0.3)
BILIRUB SERPL-MCNC: 1 MG/DL
BILIRUB UR QL STRIP: NEGATIVE
BUN SERPL-MCNC: 10.5 MG/DL (ref 6–20)
CALCIUM SERPL-MCNC: 9.5 MG/DL (ref 8.8–10.4)
CHLORIDE SERPL-SCNC: 105 MMOL/L (ref 98–107)
COLOR UR AUTO: YELLOW
CREAT SERPL-MCNC: 1.09 MG/DL (ref 0.51–0.95)
EGFRCR SERPLBLD CKD-EPI 2021: 66 ML/MIN/1.73M2
ERYTHROCYTE [DISTWIDTH] IN BLOOD BY AUTOMATED COUNT: 11.4 % (ref 10–15)
GLUCOSE SERPL-MCNC: 134 MG/DL (ref 70–99)
GLUCOSE UR STRIP-MCNC: NEGATIVE MG/DL
HCG INTACT+B SERPL-ACNC: <1 MIU/ML
HCO3 SERPL-SCNC: 22 MMOL/L (ref 22–29)
HCT VFR BLD AUTO: 46.8 % (ref 35–47)
HGB BLD-MCNC: 16.3 G/DL (ref 11.7–15.7)
HGB UR QL STRIP: ABNORMAL
KETONES UR STRIP-MCNC: 60 MG/DL
LEUKOCYTE ESTERASE UR QL STRIP: NEGATIVE
LIPASE SERPL-CCNC: 42 U/L (ref 13–60)
MCH RBC QN AUTO: 28.8 PG (ref 26.5–33)
MCHC RBC AUTO-ENTMCNC: 34.8 G/DL (ref 31.5–36.5)
MCV RBC AUTO: 83 FL (ref 78–100)
MUCOUS THREADS #/AREA URNS LPF: PRESENT /LPF
NITRATE UR QL: NEGATIVE
PH UR STRIP: 6 [PH] (ref 5–7)
PLATELET # BLD AUTO: 252 10E3/UL (ref 150–450)
POTASSIUM SERPL-SCNC: 4.1 MMOL/L (ref 3.4–5.3)
PROT SERPL-MCNC: 7.7 G/DL (ref 6.4–8.3)
RBC # BLD AUTO: 5.65 10E6/UL (ref 3.8–5.2)
RBC URINE: 146 /HPF
SODIUM SERPL-SCNC: 139 MMOL/L (ref 135–145)
SP GR UR STRIP: 1 (ref 1–1.03)
SQUAMOUS EPITHELIAL: 7 /HPF
UROBILINOGEN UR STRIP-MCNC: 2 MG/DL
WBC # BLD AUTO: 11.7 10E3/UL (ref 4–11)
WBC URINE: 1 /HPF

## 2025-02-06 PROCEDURE — 96374 THER/PROPH/DIAG INJ IV PUSH: CPT | Mod: 59 | Performed by: STUDENT IN AN ORGANIZED HEALTH CARE EDUCATION/TRAINING PROGRAM

## 2025-02-06 PROCEDURE — 74177 CT ABD & PELVIS W/CONTRAST: CPT

## 2025-02-06 PROCEDURE — 83690 ASSAY OF LIPASE: CPT

## 2025-02-06 PROCEDURE — 85048 AUTOMATED LEUKOCYTE COUNT: CPT

## 2025-02-06 PROCEDURE — 250N000013 HC RX MED GY IP 250 OP 250 PS 637

## 2025-02-06 PROCEDURE — 96361 HYDRATE IV INFUSION ADD-ON: CPT | Performed by: STUDENT IN AN ORGANIZED HEALTH CARE EDUCATION/TRAINING PROGRAM

## 2025-02-06 PROCEDURE — 82248 BILIRUBIN DIRECT: CPT

## 2025-02-06 PROCEDURE — 80053 COMPREHEN METABOLIC PANEL: CPT

## 2025-02-06 PROCEDURE — 250N000011 HC RX IP 250 OP 636

## 2025-02-06 PROCEDURE — 85014 HEMATOCRIT: CPT

## 2025-02-06 PROCEDURE — 96375 TX/PRO/DX INJ NEW DRUG ADDON: CPT | Mod: 59 | Performed by: STUDENT IN AN ORGANIZED HEALTH CARE EDUCATION/TRAINING PROGRAM

## 2025-02-06 PROCEDURE — 258N000003 HC RX IP 258 OP 636

## 2025-02-06 PROCEDURE — 36415 COLL VENOUS BLD VENIPUNCTURE: CPT

## 2025-02-06 PROCEDURE — 82310 ASSAY OF CALCIUM: CPT

## 2025-02-06 PROCEDURE — 84702 CHORIONIC GONADOTROPIN TEST: CPT

## 2025-02-06 PROCEDURE — 99285 EMERGENCY DEPT VISIT HI MDM: CPT | Mod: 25 | Performed by: STUDENT IN AN ORGANIZED HEALTH CARE EDUCATION/TRAINING PROGRAM

## 2025-02-06 PROCEDURE — 81001 URINALYSIS AUTO W/SCOPE: CPT

## 2025-02-06 RX ORDER — ONDANSETRON 2 MG/ML
4 INJECTION INTRAMUSCULAR; INTRAVENOUS ONCE
Status: COMPLETED | OUTPATIENT
Start: 2025-02-06 | End: 2025-02-06

## 2025-02-06 RX ORDER — ACETAMINOPHEN 500 MG
1000 TABLET ORAL EVERY 6 HOURS
Qty: 56 TABLET | Refills: 0 | Status: SHIPPED | OUTPATIENT
Start: 2025-02-06 | End: 2025-02-13

## 2025-02-06 RX ORDER — DIMENHYDRINATE 50 MG
50 TABLET ORAL EVERY 6 HOURS PRN
Qty: 28 TABLET | Refills: 0 | Status: SHIPPED | OUTPATIENT
Start: 2025-02-06 | End: 2025-02-13

## 2025-02-06 RX ORDER — KETOROLAC TROMETHAMINE 15 MG/ML
15 INJECTION, SOLUTION INTRAMUSCULAR; INTRAVENOUS ONCE
Status: COMPLETED | OUTPATIENT
Start: 2025-02-06 | End: 2025-02-06

## 2025-02-06 RX ORDER — OXYCODONE HYDROCHLORIDE 5 MG/1
5 TABLET ORAL EVERY 4 HOURS PRN
Qty: 12 TABLET | Refills: 0 | Status: SHIPPED | OUTPATIENT
Start: 2025-02-06 | End: 2025-02-10

## 2025-02-06 RX ORDER — ONDANSETRON 4 MG/1
4 TABLET, ORALLY DISINTEGRATING ORAL EVERY 8 HOURS PRN
Qty: 12 TABLET | Refills: 0 | Status: SHIPPED | OUTPATIENT
Start: 2025-02-06

## 2025-02-06 RX ORDER — DIMENHYDRINATE 50 MG
50 TABLET ORAL AT BEDTIME
Qty: 7 TABLET | Refills: 0 | Status: SHIPPED | OUTPATIENT
Start: 2025-02-06 | End: 2025-02-13

## 2025-02-06 RX ORDER — OXYCODONE HYDROCHLORIDE 5 MG/1
5 TABLET ORAL ONCE
Status: COMPLETED | OUTPATIENT
Start: 2025-02-07 | End: 2025-02-06

## 2025-02-06 RX ORDER — IOPAMIDOL 755 MG/ML
90 INJECTION, SOLUTION INTRAVASCULAR ONCE
Status: COMPLETED | OUTPATIENT
Start: 2025-02-06 | End: 2025-02-06

## 2025-02-06 RX ADMIN — SODIUM CHLORIDE 1000 ML: 9 INJECTION, SOLUTION INTRAVENOUS at 20:43

## 2025-02-06 RX ADMIN — KETOROLAC TROMETHAMINE 15 MG: 15 INJECTION, SOLUTION INTRAMUSCULAR; INTRAVENOUS at 20:45

## 2025-02-06 RX ADMIN — Medication 50 MG: at 22:55

## 2025-02-06 RX ADMIN — ONDANSETRON 4 MG: 2 INJECTION, SOLUTION INTRAMUSCULAR; INTRAVENOUS at 20:45

## 2025-02-06 RX ADMIN — OXYCODONE HYDROCHLORIDE 5 MG: 5 TABLET ORAL at 23:51

## 2025-02-06 RX ADMIN — IOPAMIDOL 90 ML: 755 INJECTION, SOLUTION INTRAVENOUS at 21:54

## 2025-02-06 ASSESSMENT — COLUMBIA-SUICIDE SEVERITY RATING SCALE - C-SSRS
1. IN THE PAST MONTH, HAVE YOU WISHED YOU WERE DEAD OR WISHED YOU COULD GO TO SLEEP AND NOT WAKE UP?: NO
2. HAVE YOU ACTUALLY HAD ANY THOUGHTS OF KILLING YOURSELF IN THE PAST MONTH?: NO
6. HAVE YOU EVER DONE ANYTHING, STARTED TO DO ANYTHING, OR PREPARED TO DO ANYTHING TO END YOUR LIFE?: NO

## 2025-02-07 NOTE — DISCHARGE INSTRUCTIONS
You were seen in the emergency department for left-sided flank and abdominal pain.  You have a kidney stone.  Like we discussed thankfully there is no sign of infection of the stone.  The stone should pass on its own.  Our goal is to make you more comfortable while the stone passes.    Make sure you stay well-hydrated.    Use Tylenol as prescribed.  Do not take more than 4000 mg in 24 hours.  Also use Dramamine as prescribed.  I prescribed you Zofran to use as needed for nausea or vomiting.  I prescribed you oxycodone to use as needed for breakthrough pain.    You have been prescribed a narcotic pain medication that has risk for addiction with prolonged use, so please use sparingly.  Additionally, narcotics are medications that are sedating (will make you sleepy), so do not drive or operate machinery while taking this medication.  Avoid alcohol or other sedating medicines such as benzodiazepines while taking narcotics due to risk for increased sedation and difficulty breathing.      Narcotics will cause constipation.  If you need to take this medication please consider taking an over-the-counter stool softener and laxative, such as Senna Plus, to prevent constipation from developing.  Nausea is a side effect of narcotic use.  Possible additional side effects include vomiting, itching, and dizziness/lightheadedness.    Return to the emergency department for fever, uncontrollable pain, or any other concerning symptoms.

## 2025-02-07 NOTE — ED TRIAGE NOTES
Patient presents via EMS with c/o left flank pain that radiates into abdomen that started 2 hours ago.  Pt was given [id number]/Dilaudid 1mg enroute, pain has decreased.

## 2025-02-07 NOTE — ED PROVIDER NOTES
"Emergency Department Midlevel Supervisory Note     I had a face to face encounter with this patient seen by the Advanced Practice Provider (MIKKI). I personally made/approved the management plan and take responsibility for the patient management. I personally saw patient and performed a substantive portion of the visit including all aspects of the medical decision making.     ED Course:   Ewa Escobar PA-C staffed patient with me. I agree with their assessment and plan of management, and I will see the patient.   I met with the patient to introduce myself, gather additional history, perform my initial exam, and discuss the plan.     Brief HPI:     Yokasta Gary is a 39 year old female who presents for evaluation of flank pain.        Brief Physical Exam: BP (!) 163/97   Pulse 96   Temp 98.4  F (36.9  C) (Oral)   Resp 18   Ht 1.6 m (5' 3\")   Wt 88.5 kg (195 lb)   LMP 02/04/2025   SpO2 97%   BMI 34.54 kg/m    Constitutional:  Alert, in no acute distress  EYES: Conjunctivae clear  HENT:  Atraumatic  Respiratory:  Respirations even, unlabored, in no acute respiratory distress  Cardiovascular:  Regular rate and rhythm, good peripheral perfusion  GI: Soft, non-distended, non-tender  Musculoskeletal:  Moves all 4 extremities equally, grossly symmetrical strength  Integument: Warm & dry. No appreciable rash, erythema.  Neurologic:  Alert & oriented, speech clear and fluent, no focal deficits noted  Psych: Normal mood and affect       MDM:  3-year-old female history of diabetes, here with left-sided abdominal discomfort.  When she was apathy, the kidney stone had been diagnosed with a UA that did not show signs of infection.  Mild PANCHITO however was given fluids.    On arrival here when I saw her she looks much more comfortable.  The midlevel had appropriately treated her pain as well as nausea.  She is comfortable and well-appearing.  Physical examination unremarkable.  KSI follow-up.  Pain medication given.  Jose Eduardo " precautions.  ED Course as of 02/06/25 2346   Thu Feb 06, 2025 1928 I met with the patient for an initial encounter and evaluation.       1. Left ureteral stone            Labs and Imaging:  Results for orders placed or performed during the hospital encounter of 02/06/25   CT Abdomen Pelvis w Contrast    Impression    IMPRESSION:   1.  3 mm obstructing stone proximal left ureter resulting in mild hydronephrosis.  2.  Hypodensities right uterine wall may represent fibroids versus adjacent adnexal cysts. This could be further assessed with pelvic ultrasound.  3.  Gastric bypass.   UA with Microscopic reflex to Culture    Specimen: Urine, NOS   Result Value Ref Range    Color Urine Yellow Colorless, Straw, Light Yellow, Yellow    Appearance Urine Clear Clear    Glucose Urine Negative Negative mg/dL    Bilirubin Urine Negative Negative    Ketones Urine 60 (A) Negative mg/dL    Specific Gravity Urine 1.000 (L) 1.001 - 1.030    Blood Urine >1.0 mg/dL (A) Negative    pH Urine 6.0 5.0 - 7.0    Protein Albumin Urine 30 (A) Negative mg/dL    Urobilinogen Urine 2.0 (A) <2.0 mg/dL    Nitrite Urine Negative Negative    Leukocyte Esterase Urine Negative Negative    Mucus Urine Present (A) None Seen /LPF    RBC Urine 146 (H) <=2 /HPF    WBC Urine 1 <=5 /HPF    Squamous Epithelials Urine 7 (H) <=1 /HPF   CBC with platelets   Result Value Ref Range    WBC Count 11.7 (H) 4.0 - 11.0 10e3/uL    RBC Count 5.65 (H) 3.80 - 5.20 10e6/uL    Hemoglobin 16.3 (H) 11.7 - 15.7 g/dL    Hematocrit 46.8 35.0 - 47.0 %    MCV 83 78 - 100 fL    MCH 28.8 26.5 - 33.0 pg    MCHC 34.8 31.5 - 36.5 g/dL    RDW 11.4 10.0 - 15.0 %    Platelet Count 252 150 - 450 10e3/uL   Basic metabolic panel   Result Value Ref Range    Sodium 139 135 - 145 mmol/L    Potassium 4.1 3.4 - 5.3 mmol/L    Chloride 105 98 - 107 mmol/L    Carbon Dioxide (CO2) 22 22 - 29 mmol/L    Anion Gap 12 7 - 15 mmol/L    Urea Nitrogen 10.5 6.0 - 20.0 mg/dL    Creatinine 1.09 (H) 0.51 - 0.95  mg/dL    GFR Estimate 66 >60 mL/min/1.73m2    Calcium 9.5 8.8 - 10.4 mg/dL    Glucose 134 (H) 70 - 99 mg/dL   Hepatic function panel   Result Value Ref Range    Protein Total 7.7 6.4 - 8.3 g/dL    Albumin 4.7 3.5 - 5.2 g/dL    Bilirubin Total 1.0 <=1.2 mg/dL    Alkaline Phosphatase 96 40 - 150 U/L    AST 20 0 - 45 U/L    ALT 17 0 - 50 U/L    Bilirubin Direct 0.21 0.00 - 0.30 mg/dL   Result Value Ref Range    Lipase 42 13 - 60 U/L   HCG quantitative pregnancy (blood)   Result Value Ref Range    hCG Quantitative <1 <5 mIU/mL         Procedures:  I was present for the key portions of procedures documented in MIKKI/midlevel note, see midlevel note for further details.    Anthony Arriola DO  [facility] EMERGENCY DEPARTMENT  [address] [place] [id number] [phone]       Anthony Arriola DO  02/06/25 8831

## 2025-02-07 NOTE — ED PROVIDER NOTES
Emergency Department Encounter   NAME: Yokasta Gary  AGE: 39 year old female  YOB: 1985  MRN: [id number]    PCP: Dennise Ruff  ED PROVIDER: Ewa Escobar PA-C    Evaluation Date & Time:   No admission date for patient encounter.    CHIEF COMPLAINT:  Flank Pain      Impression and Plan   MDM: 39-year-old female with history of type 2 diabetes and s/p Lupe-en-Y gastric bypass 2.5 years ago presents for evaluation of left-sided abdominal pain.  Sudden onset of left-sided flank pain radiating to the left upper quadrant today.  She could not get comfortable.  When the pain reached its peak she had 1 episode of nonbloody emesis.  No fevers.  No history of kidney stones or diverticulitis.  LMP started a few days ago and she is s/p bilateral salpingectomy and has an IUD in place.    Differential ureteral stone, UTI, pyelonephritis, diverticulitis.  Less likely pancreatitis, cholecystitis based on history and exam.  No lower abdominal tenderness that would be more concerning for ovarian cyst rupture or ovarian torsion.  Not consistent with obstruction, mesenteric ischemia.  Low suspicion for acute aortic syndrome based on history and exam.  We discussed plans for labs, CT, fluids/Toradol/Zofran for symptoms at this time which patient is agreeable to.    Lab work is notable for a mild leukocytosis of 11.7.  No anemia.  Hemoglobin is slightly elevated at 16.3.  Suspect likely some hemoconcentration with elevated WBC, RBC, hemoglobin 9.  No concerning electrolyte abnormality.  Mild PANCHITO.  Getting fluids.  Normal lipase, pancreatitis unlikely.  Normal LFTs, hepatobiliary obstruction is unlikely.  UA with blood but no evidence of infection.  Negative pregnancy test.  CT abdomen and pelvis per my independent interpretation with left ureteral stone.  She read 3 mm obstructing stone in the proximal left ureter with mild hydronephrosis.  Radiology read does also note hypodensities in the right uterine wall which may  represent fibroids or adnexal cysts.  This would not be consistent with patient's presentation today.    Reassessed the patient.  She continues to rest comfortably after Toradol, Zofran, fluids, Dramamine.  Discussed all lab and imaging results.  Reviewed plan for symptomatic expectant management of ureteral stone.  Will discharge home with KSI follow-up, urine strainer, Tylenol, oxycodone, Zofran, Dramamine.  She is unable to take ibuprofen due to gastric bypass.  Reviewed use and side effects of narcotic medications.  Patient feels comfortable with this plan.  She will get one dose of oxycodone prior to leaving today as she won't be able to pick any up until tomorrow morning when the pharmacy opens.    Did also discuss uterine findings on CT.  Patient has an upcoming appointment with OB/GYN to discuss her heavier cycles despite Mirena IUD.  She has no lower abdominal pain.  I do not think that pelvic ultrasound needs to be completed in the emergency department which patient is agreeable to.  She is going to mention these findings to her OB/GYN when she meets with them.    We reviewed strict return precautions, signs of infection, and patient was discharged home in stable condition.    I have staffed the patient with Dr. Arriola, ED physician, who will evaluate the patient and agrees with all aspects of today's care.       ED Course as of 02/06/25 2355   Thu Feb 06, 2025 1928 I met with the patient for an initial encounter and evaluation.       Medical Decision Making  Obtained supplemental history:Supplemental history obtained?: Documented in chart  Reviewed external records: External records reviewed?: Documented in chart  Care impacted by chronic illness:Diabetes  Did you consider but not order tests?: In addition to work-up documented, I considered the following work up:   Did you interpret images independently?: Independent interpretation of ECG and images noted in documentation, when applicable.  Consultation  discussion with other provider:Did you involve another provider (consultant, , pharmacy, etc.)?: No  Discharge. I prescribed additional prescription strength medication(s) as charted. N/A.    MIPS: Not Applicable        FINAL IMPRESSION:    ICD-10-CM    1. Left ureteral stone  N20.1 Adult Urology Referral            MEDICATIONS GIVEN IN THE EMERGENCY DEPARTMENT:  Medications   sodium chloride 0.9% BOLUS 1,000 mL (0 mLs Intravenous Stopped 2/6/25 2350)   ketorolac (TORADOL) injection 15 mg (15 mg Intravenous $Given 2/6/25 2045)   ondansetron (ZOFRAN) injection 4 mg (4 mg Intravenous $Given 2/6/25 2045)   iopamidol (ISOVUE-370) solution 90 mL (90 mLs Intravenous $Given 2/6/25 2154)   dimenhyDRINATE chew tab 50 mg (50 mg Oral $Given 2/6/25 2255)   oxyCODONE (ROXICODONE) tablet 5 mg (5 mg Oral $Given 2/6/25 2351)         NEW PRESCRIPTIONS STARTED AT TODAY'S ED VISIT:  New Prescriptions    ACETAMINOPHEN (TYLENOL) 500 MG TABLET    Take 2 tablets (1,000 mg) by mouth every 6 hours for 7 days.    DIMENHYDRINATE (DRAMAMINE) 50 MG TABLET    Take 1 tablet (50 mg) by mouth at bedtime for 7 days.    DIMENHYDRINATE (DRAMAMINE) 50 MG TABLET    Take 1 tablet (50 mg) by mouth every 6 hours as needed for other (kidney stone pain management).    ONDANSETRON (ZOFRAN ODT) 4 MG ODT TAB    Take 1 tablet (4 mg) by mouth every 8 hours as needed for nausea.    OXYCODONE (ROXICODONE) 5 MG TABLET    Take 1 tablet (5 mg) by mouth every 4 hours as needed for severe pain. If pain is not improved with acetaminophen and ibuprofen.         HPI   Patient information was obtained from: Patient   Use of Intrepreter: N/A     Yokasta Gary is a 39 year old female with a pertinent history of diabetes who presents to the ED by ambulance for evaluation of left flank pain which spontaneously occurred at 3:00 PM, this afternoon (~4.5 hours ago). She attempted to bathe and lay down but felt as if her pain worsened. She also tried taking deep breaths  "without relief. She states standing, laying down, or sitting makes no difference in her pain. Patient reports her pain radiates from the left flank to left upper abdomen. She endorses vomiting from the pain and nausea. She called EMS tonight due to her ongoing pain. She was given Dilaudid and Zofran en route, with good relief. She currently feels \"clammy\" and feels as if her pain is now \"squeezing\" in the abdomen. She is unsure if she has a fever. Patient is currently on her menstrual cycle, started on 2/4/25 (~2 days ago). She has a history of a tubal ligation and currently has an IUD placed. Patient has a history of bariatric surgery. She denies a history of kidney stones, diverticulitis, and has never had a colonoscopy.    Patient denies history of similar pain, dysuria, hematuria, chest pain, or shortness of breath. No other medical concerns are expressed at this time.     Per chart review, patient visited [facility] on 1/24/25, for a weight check. Hx of 2.5 years s/p  Lupe en Y Gastric Bypass with Dr. Mcgraw.  Scarro tolerated but some constipation. Glycemic control has been good. Recent labs on 8/22/24, reassuring. Weight down 76 lbs from introductory weight, a 27.1% total body weight reduction.     REVIEW OF SYSTEMS:  Pertinent positive and negative symptoms per HPI.       Physical Exam     First Vitals:  Patient Vitals for the past 24 hrs:   BP Temp Temp src Pulse Resp SpO2 Height Weight   02/06/25 2205 (!) 163/97 -- -- 96 18 97 % -- --   02/06/25 1906 137/88 98.4  F (36.9  C) Oral 90 18 97 % 1.6 m (5' 3\") 88.5 kg (195 lb)       PHYSICAL EXAM:   General Appearance:  Alert, cooperative, no distress, appears stated age  HENT: Normocephalic without obvious deformity, atraumatic. Mucous membranes moist   Eyes: Conjunctiva clear, Lids normal. No discharge.   Respiratory: No distress. Lungs clear to ausculation bilaterally. No wheezes, rhonchi or " stridor  Cardiovascular: Regular rate and rhythm, no murmur. Normal cap refill.   GI: Left upper quadrant tenderness to palpation without any rigidity, rebound, guarding, or distention.  : L CVA tenderness. No R CVA tenderness.   Musculoskeletal: Moving all extremities. No gross deformities  Integument: Warm, dry, no rashes or lesions  Neurologic: Alert and orientated x3. Psych: Normal mood and affect      Results     LAB:  All pertinent labs reviewed and interpreted  Labs Ordered and Resulted from Time of ED Arrival to Time of ED Departure   ROUTINE UA WITH MICROSCOPIC REFLEX TO CULTURE - Abnormal       Result Value    Color Urine Yellow      Appearance Urine Clear      Glucose Urine Negative      Bilirubin Urine Negative      Ketones Urine 60 (*)     Specific Gravity Urine 1.000 (*)     Blood Urine >1.0 mg/dL (*)     pH Urine 6.0      Protein Albumin Urine 30 (*)     Urobilinogen Urine 2.0 (*)     Nitrite Urine Negative      Leukocyte Esterase Urine Negative      Mucus Urine Present (*)     RBC Urine 146 (*)     WBC Urine 1      Squamous Epithelials Urine 7 (*)    CBC WITH PLATELETS - Abnormal    WBC Count 11.7 (*)     RBC Count 5.65 (*)     Hemoglobin 16.3 (*)     Hematocrit 46.8      MCV 83      MCH 28.8      MCHC 34.8      RDW 11.4      Platelet Count 252     BASIC METABOLIC PANEL - Abnormal    Sodium 139      Potassium 4.1      Chloride 105      Carbon Dioxide (CO2) 22      Anion Gap 12      Urea Nitrogen 10.5      Creatinine 1.09 (*)     GFR Estimate 66      Calcium 9.5      Glucose 134 (*)    HEPATIC FUNCTION PANEL - Normal    Protein Total 7.7      Albumin 4.7      Bilirubin Total 1.0      Alkaline Phosphatase 96      AST 20      ALT 17      Bilirubin Direct 0.21     LIPASE - Normal    Lipase 42     HCG QUANTITATIVE PREGNANCY - Normal    hCG Quantitative <1         RADIOLOGY:  CT Abdomen Pelvis w Contrast   Final Result   IMPRESSION:    1.  3 mm obstructing stone proximal left ureter resulting in mild  hydronephrosis.   2.  Hypodensities right uterine wall may represent fibroids versus adjacent adnexal cysts. This could be further assessed with pelvic ultrasound.   3.  Gastric bypass.          I, Chucky Hull, am serving as a scribe to document services personally performed by Ewa Escobar PA-C, based on my observation and the provider's statements to me. I, Ewa Escobar PA-C attest that Chucky Hull is acting in a scribe capacity, has observed my performance of the services and has documented them in accordance with my direction.       Ewa Escobar PA-C   Emergency Medicine   [facility] EMERGENCY DEPARTMENT       Ewa Escobar PA-C  02/06/25 9425

## 2025-02-14 ENCOUNTER — LAB (OUTPATIENT)
Dept: LAB | Facility: HOSPITAL | Age: 40
End: 2025-02-14
Payer: COMMERCIAL

## 2025-02-14 DIAGNOSIS — N20.0 CALCULUS OF KIDNEY: ICD-10-CM

## 2025-02-14 PROCEDURE — 82365 CALCULUS SPECTROSCOPY: CPT

## 2025-02-18 LAB
APPEARANCE STONE: NORMAL
COMPN STONE: NORMAL
SPECIMEN WT: 17 MG

## 2025-02-24 ENCOUNTER — MYC REFILL (OUTPATIENT)
Dept: SURGERY | Facility: CLINIC | Age: 40
End: 2025-02-24
Payer: COMMERCIAL

## 2025-02-24 DIAGNOSIS — E11.9 TYPE II DIABETES MELLITUS, WELL CONTROLLED (H): ICD-10-CM

## 2025-03-15 ENCOUNTER — HEALTH MAINTENANCE LETTER (OUTPATIENT)
Age: 40
End: 2025-03-15

## 2025-06-06 ENCOUNTER — ANCILLARY PROCEDURE (OUTPATIENT)
Dept: MAMMOGRAPHY | Facility: CLINIC | Age: 40
End: 2025-06-06
Attending: FAMILY MEDICINE
Payer: COMMERCIAL

## 2025-06-06 DIAGNOSIS — Z12.31 VISIT FOR SCREENING MAMMOGRAM: ICD-10-CM

## 2025-06-06 PROCEDURE — 77067 SCR MAMMO BI INCL CAD: CPT

## 2025-06-21 DIAGNOSIS — E11.9 TYPE II DIABETES MELLITUS, WELL CONTROLLED (H): ICD-10-CM

## 2025-06-28 ENCOUNTER — HEALTH MAINTENANCE LETTER (OUTPATIENT)
Age: 40
End: 2025-06-28

## 2025-07-29 ENCOUNTER — MYC MEDICAL ADVICE (OUTPATIENT)
Dept: SURGERY | Facility: CLINIC | Age: 40
End: 2025-07-29
Payer: COMMERCIAL

## 2025-07-29 DIAGNOSIS — K90.9 INTESTINAL MALABSORPTION, UNSPECIFIED TYPE: ICD-10-CM

## 2025-07-29 DIAGNOSIS — E11.9 TYPE II DIABETES MELLITUS, WELL CONTROLLED (H): Primary | ICD-10-CM

## 2025-07-29 DIAGNOSIS — Z98.84 HX OF GASTRIC BYPASS: ICD-10-CM

## (undated) DEVICE — ENDO TROCAR FIRST ENTRY KII FIOS Z-THRD 12X100MM CTF73

## (undated) DEVICE — Device

## (undated) DEVICE — SOL WATER IRRIG 1000ML BOTTLE 2F7114

## (undated) DEVICE — DRAPE SHEET REV FOLD 3/4 9349

## (undated) DEVICE — TUBING LAP/SUCT IRRIG DAVOL 0026880

## (undated) DEVICE — ENDO SHEARS RENEW LAP ENDOCUT SCISSOR TIP 16.5MM 3142

## (undated) DEVICE — SYR 30ML LL W/O NDL 302832

## (undated) DEVICE — GLOVE BIOGEL PI SZ 6.5 40865

## (undated) DEVICE — GOWN IMPERVIOUS BREATHABLE SMART XLG 89045

## (undated) DEVICE — STPL RELOAD REG TISSUE ECHELON 60 X 3.6MM BLUE GST60B

## (undated) DEVICE — GLOVE BIOGEL PI SZ 8.0 40880

## (undated) DEVICE — SOL NACL 0.9% IRRIG 1000ML BOTTLE 2F7124

## (undated) DEVICE — ESU HARMONIC ACE LAP SHEARS STRYKER ACE+ 7 5MMX45CM HARH45

## (undated) DEVICE — DRSG STERI STRIP 1/2X4" R1547

## (undated) DEVICE — STPL POWERED ECHELON LONG 60MM PLEE60A

## (undated) DEVICE — SU SILK 0 SH 30" K834H

## (undated) DEVICE — PREP CHLORAPREP 26ML TINTED HI-LITE ORANGE 930815

## (undated) DEVICE — DRAPE SURGICAL BARIATRIC 92INW X 110INW X 132INL 94590

## (undated) DEVICE — NEEDLE SPINAL DISP 22GA X 3.5" QUINCKE 333320

## (undated) DEVICE — SYR 50ML CATH TIP W/O NDL 309620

## (undated) DEVICE — SYSTEM LAPAROVUE VISIBILITY LAPVUE10

## (undated) DEVICE — SOL RINGERS LACTATED 1000ML BAG 2B2324X

## (undated) DEVICE — CUSTOM PACK LAP CHOLE SBA5BLCHEA

## (undated) DEVICE — BANDAGE ADH LF 1X3 ABN3100A

## (undated) DEVICE — TUBING SMOKE EVAC PNEUMOCLEAR HIGH FLOW 0620050250

## (undated) DEVICE — ENDO TROCAR OPTICAL ACCESS KII Z-THRD 05X100MM CTR03

## (undated) DEVICE — ENDO TROCAR SLEEVE KII Z-THREADED 05X100MM CTS02

## (undated) DEVICE — SUTURE VICRYL+ 4-0 UNDYED PS-2 VCP496H

## (undated) DEVICE — SUTURE VICRYL+ 2-0 27IN SH UND VCP417H

## (undated) DEVICE — DECANTER VIAL 2006S

## (undated) DEVICE — GOWN IMPERVIOUS BREATHABLE SMART LG 89015

## (undated) DEVICE — TUBE NASOGASTRIC 18FR 48" 2 LUMEN 8888266148

## (undated) DEVICE — ESU CORD MONOPOLAR 10'  E0510

## (undated) DEVICE — SUTURE VICRYL+ 2-0 27 VIO VCP317H

## (undated) DEVICE — TUBE COLON 32FR 30IN LNG 080074200

## (undated) RX ORDER — PROPOFOL 10 MG/ML
INJECTION, EMULSION INTRAVENOUS
Status: DISPENSED
Start: 2022-08-29

## (undated) RX ORDER — FENTANYL CITRATE 50 UG/ML
INJECTION, SOLUTION INTRAMUSCULAR; INTRAVENOUS
Status: DISPENSED
Start: 2022-08-29

## (undated) RX ORDER — BUPIVACAINE HYDROCHLORIDE AND EPINEPHRINE 2.5; 5 MG/ML; UG/ML
INJECTION, SOLUTION INFILTRATION; PERINEURAL
Status: DISPENSED
Start: 2022-08-29

## (undated) RX ORDER — FENTANYL CITRATE-0.9 % NACL/PF 10 MCG/ML
PLASTIC BAG, INJECTION (ML) INTRAVENOUS
Status: DISPENSED
Start: 2022-08-29

## (undated) RX ORDER — ONDANSETRON 2 MG/ML
INJECTION INTRAMUSCULAR; INTRAVENOUS
Status: DISPENSED
Start: 2022-08-29

## (undated) RX ORDER — KETAMINE HYDROCHLORIDE 10 MG/ML
INJECTION INTRAMUSCULAR; INTRAVENOUS
Status: DISPENSED
Start: 2022-08-29